# Patient Record
Sex: FEMALE | Race: WHITE | NOT HISPANIC OR LATINO | Employment: OTHER | URBAN - METROPOLITAN AREA
[De-identification: names, ages, dates, MRNs, and addresses within clinical notes are randomized per-mention and may not be internally consistent; named-entity substitution may affect disease eponyms.]

---

## 2018-05-09 LAB
ABSOL LYMPHOCYTES (HISTORICAL): 1.4 K/UL (ref 0.5–4)
ALBUMIN SERPL BCP-MCNC: 4.4 G/DL (ref 3–5.2)
ALP SERPL-CCNC: 54 U/L (ref 43–122)
ALT SERPL W P-5'-P-CCNC: 29 U/L (ref 9–52)
AMORPHOUS MATERIAL (HISTORICAL): ABNORMAL
ANION GAP SERPL CALCULATED.3IONS-SCNC: 11 MMOL/L (ref 5–14)
AST SERPL W P-5'-P-CCNC: 25 U/L (ref 14–36)
BACTERIA UR QL AUTO: ABNORMAL
BASOPHILS # BLD AUTO: 0 K/UL (ref 0–0.1)
BASOPHILS # BLD AUTO: 1 % (ref 0–1)
BILIRUB SERPL-MCNC: 0.4 MG/DL
BILIRUB UR QL STRIP: NEGATIVE MG/DL
BUN SERPL-MCNC: 19 MG/DL (ref 5–25)
CALCIUM SERPL-MCNC: 10 MG/DL (ref 8.4–10.2)
CASTS/CASTS TYPE (HISTORICAL): ABNORMAL /LPF
CHLORIDE SERPL-SCNC: 100 MEQ/L (ref 97–108)
CHOLEST SERPL-MCNC: 172 MG/DL
CHOLEST/HDLC SERPL: 3.6 {RATIO}
CLARITY UR: CLEAR
CO2 SERPL-SCNC: 32 MMOL/L (ref 22–30)
COLOR UR: ABNORMAL
CREATINE, SERUM (HISTORICAL): 0.69 MG/DL (ref 0.6–1.2)
CREATININE, RANDOM URINE (HISTORICAL): 38.3 MG/DL (ref 50–200)
CRYSTAL TYPE (HISTORICAL): ABNORMAL /HPF
DEPRECATED RDW RBC AUTO: 14.5 %
EGFR (HISTORICAL): >60 ML/MIN/1.73 M2
EOSINOPHIL # BLD AUTO: 0.1 K/UL (ref 0–0.4)
EOSINOPHIL NFR BLD AUTO: 2 % (ref 0–6)
EST. AVERAGE GLUCOSE BLD GHB EST-MCNC: 143 MG/DL
GLUCOSE FASTING (HISTORICAL): 121 MG/DL (ref 70–99)
GLUCOSE UR STRIP-MCNC: NEGATIVE MG/DL
HBA1C MFR BLD HPLC: 6.6 %
HCT VFR BLD AUTO: 42.5 % (ref 36–46)
HDLC SERPL-MCNC: 48 MG/DL
HGB BLD-MCNC: 14.1 G/DL (ref 12–16)
HGB UR QL STRIP.AUTO: NEGATIVE
KETONES UR STRIP-MCNC: NEGATIVE MG/DL
LDL/HDL RATIO (HISTORICAL): 2
LDLC SERPL CALC-MCNC: 95 MG/DL
LEUKOCYTE ESTERASE UR QL STRIP: ABNORMAL
LYMPHOCYTES NFR BLD AUTO: 23 % (ref 25–45)
MCH RBC QN AUTO: 28.6 PG (ref 26–34)
MCHC RBC AUTO-ENTMCNC: 33.1 % (ref 31–36)
MCV RBC AUTO: 86 FL (ref 80–100)
MICROALBUM.,U,RANDOM (HISTORICAL): <0.6 MG/DL
MICROALBUMIN/CREATININE RATIO (HISTORICAL): ABNORMAL
MONOCYTES # BLD AUTO: 0.4 K/UL (ref 0.2–0.9)
MONOCYTES NFR BLD AUTO: 6 % (ref 1–10)
MUCOUS THREADS URNS QL MICRO: ABNORMAL
NEUTROPHILS ABS COUNT (HISTORICAL): 4 K/UL (ref 1.8–7.8)
NEUTS SEG NFR BLD AUTO: 68 % (ref 45–65)
NITRITE UR QL STRIP: NEGATIVE
NON-SQ EPI CELLS URNS QL MICRO: ABNORMAL
OTHER STN SPEC: ABNORMAL
PH UR STRIP.AUTO: 6.5 [PH] (ref 4.5–8)
PLATELET # BLD AUTO: 229 K/MCL (ref 150–450)
POTASSIUM SERPL-SCNC: 4.7 MEQ/L (ref 3.6–5)
PROT UR STRIP-MCNC: NEGATIVE MG/DL
RBC # BLD AUTO: 4.92 M/MCL (ref 4–5.2)
RBC #/AREA URNS AUTO: ABNORMAL /HPF
SODIUM SERPL-SCNC: 143 MEQ/L (ref 137–147)
SP GR UR STRIP.AUTO: 1.01 (ref 1–1.04)
TOTAL PROTEIN (HISTORICAL): 7.2 G/DL (ref 5.9–8.4)
TRIGL SERPL-MCNC: 145 MG/DL
TSH SERPL DL<=0.05 MIU/L-ACNC: 1.07 UIU/ML (ref 0.47–4.68)
UROBILINOGEN UR QL STRIP.AUTO: NEGATIVE MG/DL (ref 0–1)
VLDLC SERPL CALC-MCNC: 29 MG/DL (ref 0–40)
WBC # BLD AUTO: 5.9 K/MCL (ref 4.5–11)
WBC #/AREA URNS AUTO: 3 /HPF

## 2018-08-02 RX ORDER — HYDROCHLOROTHIAZIDE 25 MG/1
TABLET ORAL
COMMUNITY
Start: 2018-05-24 | End: 2018-09-19

## 2018-08-02 RX ORDER — VALSARTAN 80 MG/1
TABLET ORAL
COMMUNITY
Start: 2017-10-16 | End: 2018-08-07 | Stop reason: SDUPTHER

## 2018-08-07 ENCOUNTER — OFFICE VISIT (OUTPATIENT)
Dept: FAMILY MEDICINE CLINIC | Facility: CLINIC | Age: 83
End: 2018-08-07
Payer: MEDICARE

## 2018-08-07 VITALS
SYSTOLIC BLOOD PRESSURE: 136 MMHG | HEIGHT: 63 IN | TEMPERATURE: 96.9 F | BODY MASS INDEX: 27.89 KG/M2 | HEART RATE: 88 BPM | WEIGHT: 157.4 LBS | DIASTOLIC BLOOD PRESSURE: 78 MMHG

## 2018-08-07 DIAGNOSIS — M25.521 ELBOW PAIN, RIGHT: Primary | ICD-10-CM

## 2018-08-07 DIAGNOSIS — I10 HYPERTENSION, UNSPECIFIED TYPE: ICD-10-CM

## 2018-08-07 DIAGNOSIS — R09.89 BRUIT: ICD-10-CM

## 2018-08-07 DIAGNOSIS — R55 BLACK-OUT (NOT AMNESIA): ICD-10-CM

## 2018-08-07 DIAGNOSIS — G45.9 TRANSIENT CEREBRAL ISCHEMIA, UNSPECIFIED TYPE: ICD-10-CM

## 2018-08-07 DIAGNOSIS — S09.90XD INJURY OF HEAD, SUBSEQUENT ENCOUNTER: ICD-10-CM
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
## 2018-08-07 DIAGNOSIS — G45.8 OTHER SPECIFIED TRANSIENT CEREBRAL ISCHEMIAS: ICD-10-CM

## 2018-08-07 PROBLEM — S09.90XA HEAD INJURY: Status: ACTIVE | Noted: 2018-08-07

## 2018-08-07 PROCEDURE — 99214 OFFICE O/P EST MOD 30 MIN: CPT | Performed by: FAMILY MEDICINE

## 2018-08-07 RX ORDER — IRBESARTAN 75 MG/1
75 TABLET ORAL DAILY
Qty: 30 TABLET | Refills: 5 | Status: SHIPPED | OUTPATIENT
Start: 2018-08-07 | End: 2018-09-06 | Stop reason: SDUPTHER

## 2018-08-07 NOTE — PROGRESS NOTES
Assessment/Plan:    No problem-specific Assessment & Plan notes found for this encounter  Diagnoses and all orders for this visit:    Elbow pain, right  Comments:  hematoma is improving  Fall (on) (from) other stairs and steps, sequela  Comments:  fell 2 weeks back and now improving    Black-out (not amnesia)  Comments:  Have 3 events when patient has completley black out and has no reason what happened,  Orders:  -     Echo complete with contrast if indicated; Future  -     Ambulatory referral to Cardiology; Future  -     VAS carotid complete study; Future    Injury of head, subsequent encounter  Comments:  No headche but h/o injury on the foreheadd on the right side  the cut is improving  Other specified transient cerebral ischemias  Comments:  Not sure its TIA, will evaluate her with the carotid doppler, MRI/MRA and Echo, will do some blood work too    [address]: VAS carotid limited study; Future  -     VAS carotid complete study; Future    Transient cerebral ischemia, unspecified type  -     Echo complete with contrast if indicated; Future    Hypertension, unspecified type  Comments:  changing the valsartan to Avapro, will referred the patient to the cardiologist for further eval  Orders:  -     irbesartan (AVAPRO) 75 mg tablet; Take 1 tablet (75 mg total) by mouth daily    Other orders  -     Discontinue: glucose blood test strip; Test sugars by percutaneous route 1 time every day as prescribed by physician  -     glucose blood test strip; use 1 strip by Misc  (Non-Drug; Combo Route) route once daily  -     Discontinue: valsartan (DIOVAN) 80 mg tablet; TAKE 1 TABLET DAILY  -     hydrochlorothiazide (HYDRODIURIL) 25 mg tablet; take 1 tablet by oral route  every morning  -     sitaGLIPtin (JANUVIA) 100 mg tablet; every 24 hours  -     LANCETS SUPER THIN 28G MISC; use 1 lancet by Misc  (Non-Drug; Combo Route) route once TEST EVERY DAY          Subjective:      Patient ID: Aleja Aura Fields is a 81 y o  female  HPI    Patient is here for f/u of the fall, now recouping patient said that she has no idea, how she fell, she thinks that she black out for few mts and find out that he had a fall and has multiple injuries  The following portions of the patient's history were reviewed and updated as appropriate:   She  has a past medical history of Hemochromatosis and Hypertension  She   Patient Active Problem List    Diagnosis Date Noted    Black-out (not amnesia) 08/07/2018    Fall (on) (from) other stairs and steps, sequela 08/07/2018    Elbow pain, right 08/07/2018    Head injury 08/07/2018    TIA (transient ischemic attack) 08/07/2018     She  has a past surgical history that includes Knee surgery (Bilateral)  Her family history includes No Known Problems in her family  She  reports that she has never smoked  She has never used smokeless tobacco  She reports that she drinks alcohol  She reports that she does not use drugs  Current Outpatient Prescriptions   Medication Sig Dispense Refill    glucose blood test strip use 1 strip by Misc  (Non-Drug; Combo Route) route once daily      hydrochlorothiazide (HYDRODIURIL) 25 mg tablet take 1 tablet by oral route  every morning      irbesartan (AVAPRO) 75 mg tablet Take 1 tablet (75 mg total) by mouth daily 30 tablet 5    LANCETS SUPER THIN 28G MISC use 1 lancet by Misc  (Non-Drug; Combo Route) route once TEST EVERY DAY      sitaGLIPtin (JANUVIA) 100 mg tablet every 24 hours       No current facility-administered medications for this visit  No current outpatient prescriptions on file prior to visit  No current facility-administered medications on file prior to visit  She is allergic to no active allergies  Review of Systems   Constitutional: Negative  HENT: Negative  Eyes: Negative  Respiratory: Negative  Cardiovascular: Negative  Gastrointestinal: Negative  Genitourinary: Negative      Psychiatric/Behavioral: Negative  Objective:      /78 (BP Location: Left arm, Patient Position: Sitting, Cuff Size: Standard)   Pulse 88   Temp (!) 96 9 °F (36 1 °C) (Tympanic)   Ht 5' 3" (1 6 m)   Wt 71 4 kg (157 lb 6 4 oz)   BMI 27 88 kg/m²          Physical Exam   Constitutional: She is oriented to person, place, and time  She appears well-developed  HENT:   Head: Normocephalic  Mouth/Throat: Oropharynx is clear and moist    Neck: Normal range of motion  Cardiovascular: Normal rate and regular rhythm  Pulmonary/Chest: Effort normal and breath sounds normal    Abdominal: Soft  Bowel sounds are normal    Neurological: She is alert and oriented to person, place, and time  Skin: Skin is warm  Psychiatric: She has a normal mood and affect

## 2018-08-13 ENCOUNTER — TRANSCRIBE ORDERS (OUTPATIENT)
Dept: ADMINISTRATIVE | Facility: HOSPITAL | Age: 83
End: 2018-08-13

## 2018-08-13 ENCOUNTER — HOSPITAL ENCOUNTER (OUTPATIENT)
Dept: RADIOLOGY | Facility: IMAGING CENTER | Age: 83
Discharge: HOME | End: 2018-08-13
Payer: MEDICARE

## 2018-08-13 DIAGNOSIS — R55 BLACK-OUT (NOT AMNESIA): ICD-10-CM

## 2018-08-13 DIAGNOSIS — R09.89 BRUIT: ICD-10-CM

## 2018-08-13 PROCEDURE — 70551 MRI BRAIN STEM W/O DYE: CPT

## 2018-08-13 PROCEDURE — 70544 MR ANGIOGRAPHY HEAD W/O DYE: CPT

## 2018-08-21 ENCOUNTER — HOSPITAL ENCOUNTER (OUTPATIENT)
Dept: NON INVASIVE DIAGNOSTICS | Facility: HOSPITAL | Age: 83
Discharge: HOME | End: 2018-08-21
Payer: MEDICARE

## 2018-08-21 DIAGNOSIS — R09.89 BRUIT: ICD-10-CM

## 2018-08-21 DIAGNOSIS — G45.9 TRANSIENT CEREBRAL ISCHEMIA, UNSPECIFIED TYPE: ICD-10-CM

## 2018-08-21 DIAGNOSIS — R55 BLACK-OUT (NOT AMNESIA): ICD-10-CM

## 2018-08-21 PROCEDURE — 93306 TTE W/DOPPLER COMPLETE: CPT

## 2018-08-21 PROCEDURE — 93880 EXTRACRANIAL BILAT STUDY: CPT

## 2018-08-21 PROCEDURE — 93880 EXTRACRANIAL BILAT STUDY: CPT | Performed by: SURGERY

## 2018-08-23 PROCEDURE — 93306 TTE W/DOPPLER COMPLETE: CPT | Performed by: INTERNAL MEDICINE

## 2018-08-23 NOTE — PROGRESS NOTES
Her Echo has some finding but its not abnormal, will discuss at the time of visit also the cardiologist will go over it too    I think she is not on any statin, if not than start her on atorvastain 10 mg (if not allergic )  Carotid doppler shows less than 50% narrowing so will start the atorvastain  MRA and MRI is fine

## 2018-08-24 ENCOUNTER — TELEPHONE (OUTPATIENT)
Dept: FAMILY MEDICINE CLINIC | Facility: CLINIC | Age: 83
End: 2018-08-24

## 2018-08-24 NOTE — TELEPHONE ENCOUNTER
----- Message from Sharla Leon MD sent at 8/23/2018  7:23 PM EDT -----  Her Echo has some finding but its not abnormal, will discuss at the time of visit also the cardiologist will go over it too    I think she is not on any statin, if not than start her on atorvastain 10 mg (if not allergic )  Carotid doppler shows less than 50% narrowing so will start the atorvastain  MRA and MRI is fine

## 2018-08-27 ENCOUNTER — TELEPHONE (OUTPATIENT)
Dept: FAMILY MEDICINE CLINIC | Facility: CLINIC | Age: 83
End: 2018-08-27

## 2018-08-27 DIAGNOSIS — E78.5 HYPERLIPIDEMIA, UNSPECIFIED HYPERLIPIDEMIA TYPE: Primary | ICD-10-CM

## 2018-08-27 NOTE — TELEPHONE ENCOUNTER
Pt aware and agreed to start on atorvastatin which will be sent to [facility] as directed on med list awaiting Dr Nair Faroese approval

## 2018-08-27 NOTE — TELEPHONE ENCOUNTER
Pt returned call about her results   She has appt with cardiologist this week which she will discuss starting on statin medication and if not she has appt with Dr Earnestine Vidal on 9/19

## 2018-08-29 RX ORDER — ATORVASTATIN CALCIUM 10 MG/1
10 TABLET, FILM COATED ORAL DAILY
Qty: 30 TABLET | Refills: 0 | Status: CANCELLED | OUTPATIENT
Start: 2018-08-29

## 2018-09-06 ENCOUNTER — OFFICE VISIT (OUTPATIENT)
Dept: CARDIOLOGY CLINIC | Facility: CLINIC | Age: 83
End: 2018-09-06
Payer: MEDICARE

## 2018-09-06 VITALS
HEIGHT: 63 IN | SYSTOLIC BLOOD PRESSURE: 148 MMHG | WEIGHT: 161 LBS | DIASTOLIC BLOOD PRESSURE: 60 MMHG | BODY MASS INDEX: 28.53 KG/M2

## 2018-09-06 DIAGNOSIS — I10 HYPERTENSION, UNSPECIFIED TYPE: ICD-10-CM

## 2018-09-06 DIAGNOSIS — R55 SYNCOPE AND COLLAPSE: Primary | ICD-10-CM

## 2018-09-06 DIAGNOSIS — R55 BLACK-OUT (NOT AMNESIA): ICD-10-CM

## 2018-09-06 PROCEDURE — 99204 OFFICE O/P NEW MOD 45 MIN: CPT | Performed by: INTERNAL MEDICINE

## 2018-09-06 RX ORDER — ATORVASTATIN CALCIUM 10 MG/1
TABLET, FILM COATED ORAL
Refills: 0 | COMMUNITY
Start: 2018-08-29 | End: 2018-11-29 | Stop reason: SDUPTHER

## 2018-09-06 RX ORDER — IRBESARTAN 75 MG/1
75 TABLET ORAL DAILY
Qty: 30 TABLET | Refills: 5 | Status: SHIPPED | OUTPATIENT
Start: 2018-09-06 | End: 2018-11-29 | Stop reason: SDUPTHER

## 2018-09-06 RX ORDER — ASPIRIN 81 MG/1
81 TABLET ORAL DAILY
Refills: 0
Start: 2018-09-06

## 2018-09-06 NOTE — LETTER
September 6, 2018     Fausto Cosme MD  [address] [facility]    Patient: Radha Nguyen   YOB: 1935   Date of Visit: 9/6/2018       Dear Dr Royal Gonzalez:    Thank you for referring Rio Reeves to me for evaluation  Below are my notes for this consultation  If you have questions, please do not hesitate to call me  I look forward to following your patient along with you  Sincerely,        Citlali Thomas MD        CC: No Recipients  Citlali Thomas MD  9/6/2018  3:47 PM  Sign at close encounter                                             Cardiology Consultation     Radha Nguyen  [id number]  1935  [address] [facility], [address] [facility]      1  Syncope and collapse  Holter monitor - 48 hour   2  Black-out (not amnesia)  Ambulatory referral to Cardiology    Have 3 events when patient has completley black out and has no reason what happened,       Discussion/Summary     Syncope, unclear etiology  Episodes are very far apart from each other  The most recent episode resulted in significant trauma when she fell down stairs although this one occurred after getting up, suggesting an orthostatic etiology  She is on HCTZ  I don't have copy of recent blood work, although she said it was done recently  I'll request the blood work as well as the EKG  If any abnormalities, or she has other symptoms of orthostasis, may be wise to change to a different antihypertensive, particularly with increasing age  Otherwise, an arrhythmia is possible although in the setting of a structurally normal heart and no palpitations and a reportedly normal EKG (I will get this as well), also unlikely  Will check a 48 hour Holter monitor to evaluate  If abnormalities or recurrence, I discussed potential of event recorder or loop recorder  No signs/symptoms of ischemia      Discussed adequate hydration, caution with changing positions  With minimal carotid disease, she was started on a statin at a low dose  Reasonable with DM as well  Will have her take 81mg aspirin also  History of Present Illness:  New patient referred by Dr Antonieta Reno for syncope  There is no significant cardiac history  She says these episodes of syncope go back to December 2016  She has had 3 such episodes total   One occurred while she was sitting in a chair  She slumped over  Came to just a second later  Returned to completely normal   Another episode when she was sitting at [employer]  She was talking to someone  Felt as though there was a gap in her thinking, but unclear if she actually lost consciousness on this episode  Most recent episode occurred when she stood up from a stool was walking around the deck and then fell down stairs  This was the most recent episode which occurred in [place]  Overall, she is otherwise without any symptoms  She has no dizziness or lightheadedness, or presyncope  There is no PND or orthopnea  She has no palpitations  Recently had some cardiac testing done including EKG, echocardiogram   She also had MRI/MRA and carotid testing  No significant abnormalities found on any of these  I do not have the EKG to review, but will request this  Since the episode which occurred most recently in July, she has been feeling fine  Has been on antihypertensives for about a year  Also diagnosed as a diabetic  Sugars have been under control without highs or lows  She tries to stay adequately hydrated throughout the day  On 1 episode, she was having an alcoholic beverage in was a hot day so she may have been a little bit dehydrated  However, in [place] she says nothing was really out of the ordinary        Patient Active Problem List   Diagnosis    Syncope and collapse    Fall (on) (from) other stairs and steps, sequela    Elbow pain, right    Head injury    TIA (transient ischemic attack)     Past Medical History:   Diagnosis Date    Hemochromatosis     Hypertension      Social History     Social History    Marital status: /Civil Union     Spouse name: N/A    Number of children: N/A    Years of education: N/A     Occupational History    Not on file  Social History Main Topics    Smoking status: Never Smoker    Smokeless tobacco: Never Used    Alcohol use Yes    Drug use: No    Sexual activity: Not on file     Other Topics Concern    Not on file     Social History Narrative    No narrative on file      Family History   Problem Relation Age of Onset    No Known Problems Family      Past Surgical History:   Procedure Laterality Date    KNEE SURGERY Bilateral        Current Outpatient Prescriptions:     atorvastatin (LIPITOR) 10 mg tablet, TK 1 T PO QD, Disp: , Rfl: 0    glucose blood test strip, use 1 strip by Misc  (Non-Drug; Combo Route) route once daily, Disp: , Rfl:     hydrochlorothiazide (HYDRODIURIL) 25 mg tablet, take 1 tablet by oral route  every morning, Disp: , Rfl:     irbesartan (AVAPRO) 75 mg tablet, Take 1 tablet (75 mg total) by mouth daily, Disp: 30 tablet, Rfl: 5    LANCETS SUPER THIN 28G MISC, use 1 lancet by Misc  (Non-Drug; Combo Route) route once TEST EVERY DAY, Disp: , Rfl:     sitaGLIPtin (JANUVIA) 100 mg tablet, every 24 hours, Disp: , Rfl:   Allergies   Allergen Reactions    No Active Allergies        Vitals:    09/06/18 1438   BP: 148/60   Weight: 73 kg (161 lb)   Height: 5' 3" (1 6 m)     Vitals:    09/06/18 1438   Weight: 73 kg (161 lb)      Height: 5' 3" (160 cm)   Body mass index is 28 52 kg/m²      Physical Exam:  GENERAL: Alert, well appearing, and in no distress  HEENT:  PERRL, EOMI, no scleral icterus, no conjunctival pallor  NECK:  Supple, No elevated JVP, no thyromegaly, no carotid bruits  HEART:  Regular rate and rhythm, normal S1/S2, no S3/S4, no murmur or rub  LUNGS:  Clear to auscultation bilaterally  ABDOMEN:  Soft, non-tender, positive bowel sounds, no rebound or guarding  EXTREMITIES:  No edema  VASCULAR:  Normal pedal pulses   NEURO: No focal deficits,  SKIN: Normal without suspicious lesions on exposed skin      ROS:  Positive for post menopausal   Except as noted in HPI, is otherwise reviewed in detail and a 12 point review of systems is negative  Labs:  Lab Results   Component Value Date     05/09/2018    K 4 7 05/09/2018     05/09/2018    BUN 19 05/09/2018    CO2 32 (H) 05/09/2018    ALT 29 05/09/2018    AST 25 05/09/2018    GLUF 121 (H) 05/09/2018    HGBA1C 6 6 (H) 05/09/2018    WBC 5 9 05/09/2018    HGB 14 1 05/09/2018    HCT 42 5 05/09/2018     05/09/2018       Lab Results   Component Value Date    CHOL 172 05/09/2018     Lab Results   Component Value Date    HDL 48 05/09/2018     Lab Results   Component Value Date    LDLCALC 95 05/09/2018     Lab Results   Component Value Date    TRIG 145 05/09/2018       Testing:  LEFT VENTRICLE:  Normal left ventricular cavity size, normal wall thickness, normal left ventricular systolic function and wall motion  Ejection fraction is estimated as around 60-65%  Early grade 1 diastolic dysfunction  Normal estimated left atrial  pressure      RIGHT VENTRICLE:  Normal right ventricular size and systolic function  Normal estimated right ventricular systolic pressure      LEFT ATRIUM:  Top normal left atrial cavity size  Slightly aneurysmal interatrial septum  No color-flow Doppler evidence of patent foramina ovale or atrial septal defect      RIGHT ATRIUM:  Normal right atrial cavity size      MITRAL VALVE:  Mitral annular calcification  Mild mitral valve leaflet thickening with focal calcification on leaflet tips  Trace mitral valve regurgitation      AORTIC VALVE:  Trileaflet aortic valve with sclerosis  No aortic valve stenosis   Trace aortic valve regurgitation      TRICUSPID VALVE:  Mild tricuspid valve regurgitation      PULMONIC VALVE:  Trace pulmonic valve regurgitation      IVC, HEPATIC VEINS:  Inferior vena cava is normal in size and demonstrates appropriate respiratory phasic changes in diameter      PERICARDIUM:  No pericardial effusion

## 2018-09-06 NOTE — PROGRESS NOTES
Cardiology Consultation     Aleja Fields  [id number]  1935  [address]  [address]      1  Syncope and collapse  Holter monitor - 48 hour   2  Black-out (not amnesia)  Ambulatory referral to Cardiology    Have 3 events when patient has completley black out and has no reason what happened,       Discussion/Summary     Syncope, unclear etiology  Episodes are very far apart from each other  The most recent episode resulted in significant trauma when she fell down stairs although this one occurred after getting up, suggesting an orthostatic etiology  She is on HCTZ  I don't have copy of recent blood work, although she said it was done recently  I'll request the blood work as well as the EKG  If any abnormalities, or she has other symptoms of orthostasis, may be wise to change to a different antihypertensive, particularly with increasing age  Otherwise, an arrhythmia is possible although in the setting of a structurally normal heart and no palpitations and a reportedly normal EKG (I will get this as well), also unlikely  Will check a 48 hour Holter monitor to evaluate  If abnormalities or recurrence, I discussed potential of event recorder or loop recorder  No signs/symptoms of ischemia  Discussed adequate hydration, caution with changing positions  With minimal carotid disease, she was started on a statin at a low dose  Reasonable with DM as well  Will have her take 81mg aspirin also  History of Present Illness:  New patient referred by Dr Earnestine Vidal for syncope  There is no significant cardiac history  She says these episodes of syncope go back to December 2016  She has had 3 such episodes total   One occurred while she was sitting in a chair  She slumped over  Came to just a second later    Returned to completely normal   Another episode when she was sitting at a restaurant  She was talking to someone  Felt as though there was a gap in her thinking, but unclear if she actually lost consciousness on this episode  Most recent episode occurred when she stood up from a stool was walking around the deck and then fell down stairs  This was the most recent episode which occurred in [place]  Overall, she is otherwise without any symptoms  She has no dizziness or lightheadedness, or presyncope  There is no PND or orthopnea  She has no palpitations  Recently had some cardiac testing done including EKG, echocardiogram   She also had MRI/MRA and carotid testing  No significant abnormalities found on any of these  I do not have the EKG to review, but will request this  Since the episode which occurred most recently in July, she has been feeling fine  Has been on antihypertensives for about a year  Also diagnosed as a diabetic  Sugars have been under control without highs or lows  She tries to stay adequately hydrated throughout the day  On 1 episode, she was having an alcoholic beverage in was a hot day so she may have been a little bit dehydrated  However, in [place] she says nothing was really out of the ordinary  Patient Active Problem List   Diagnosis    Syncope and collapse    Fall (on) (from) other stairs and steps, sequela    Elbow pain, right    Head injury    TIA (transient ischemic attack)     Past Medical History:   Diagnosis Date    Hemochromatosis     Hypertension      Social History     Social History    Marital status: /Civil Union     Spouse name: N/A    Number of children: N/A    Years of education: N/A     Occupational History    Not on file       Social History Main Topics    Smoking status: Never Smoker    Smokeless tobacco: Never Used    Alcohol use Yes    Drug use: No    Sexual activity: Not on file     Other Topics Concern    Not on file     Social History Narrative    No narrative on file      Family History   Problem Relation Age of Onset    No Known Problems Family      Past Surgical History:   Procedure Laterality Date    KNEE SURGERY Bilateral        Current Outpatient Prescriptions:     atorvastatin (LIPITOR) 10 mg tablet, TK 1 T PO QD, Disp: , Rfl: 0    glucose blood test strip, use 1 strip by Misc  (Non-Drug; Combo Route) route once daily, Disp: , Rfl:     hydrochlorothiazide (HYDRODIURIL) 25 mg tablet, take 1 tablet by oral route  every morning, Disp: , Rfl:     irbesartan (AVAPRO) 75 mg tablet, Take 1 tablet (75 mg total) by mouth daily, Disp: 30 tablet, Rfl: 5    LANCETS SUPER THIN 28G MISC, use 1 lancet by Misc  (Non-Drug; Combo Route) route once TEST EVERY DAY, Disp: , Rfl:     sitaGLIPtin (JANUVIA) 100 mg tablet, every 24 hours, Disp: , Rfl:   Allergies   Allergen Reactions    No Active Allergies        Vitals:    09/06/18 1438   BP: 148/60   Weight: 73 kg (161 lb)   Height: 5' 3" (1 6 m)     Vitals:    09/06/18 1438   Weight: 73 kg (161 lb)      Height: 5' 3" (160 cm)   Body mass index is 28 52 kg/m²  Physical Exam:  GENERAL: Alert, well appearing, and in no distress  HEENT:  PERRL, EOMI, no scleral icterus, no conjunctival pallor  NECK:  Supple, No elevated JVP, no thyromegaly, no carotid bruits  HEART:  Regular rate and rhythm, normal S1/S2, no S3/S4, no murmur or rub  LUNGS:  Clear to auscultation bilaterally  ABDOMEN:  Soft, non-tender, positive bowel sounds, no rebound or guarding  EXTREMITIES:  No edema  VASCULAR:  Normal pedal pulses   NEURO: No focal deficits,  SKIN: Normal without suspicious lesions on exposed skin      ROS:  Positive for post menopausal   Except as noted in HPI, is otherwise reviewed in detail and a 12 point review of systems is negative      Labs:  Lab Results   Component Value Date     05/09/2018    K 4 7 05/09/2018     05/09/2018    BUN 19 05/09/2018    CO2 32 (H) 05/09/2018    ALT 29 05/09/2018    AST 25 05/09/2018    GLUF 121 (H) 05/09/2018 HGBA1C 6 6 (H) 05/09/2018    WBC 5 9 05/09/2018    HGB 14 1 05/09/2018    HCT 42 5 05/09/2018     05/09/2018       Lab Results   Component Value Date    CHOL 172 05/09/2018     Lab Results   Component Value Date    HDL 48 05/09/2018     Lab Results   Component Value Date    LDLCALC 95 05/09/2018     Lab Results   Component Value Date    TRIG 145 05/09/2018       Testing:  LEFT VENTRICLE:  Normal left ventricular cavity size, normal wall thickness, normal left ventricular systolic function and wall motion  Ejection fraction is estimated as around 60-65%  Early grade 1 diastolic dysfunction  Normal estimated left atrial  pressure      RIGHT VENTRICLE:  Normal right ventricular size and systolic function  Normal estimated right ventricular systolic pressure      LEFT ATRIUM:  Top normal left atrial cavity size  Slightly aneurysmal interatrial septum  No color-flow Doppler evidence of patent foramina ovale or atrial septal defect      RIGHT ATRIUM:  Normal right atrial cavity size      MITRAL VALVE:  Mitral annular calcification  Mild mitral valve leaflet thickening with focal calcification on leaflet tips  Trace mitral valve regurgitation      AORTIC VALVE:  Trileaflet aortic valve with sclerosis  No aortic valve stenosis  Trace aortic valve regurgitation      TRICUSPID VALVE:  Mild tricuspid valve regurgitation      PULMONIC VALVE:  Trace pulmonic valve regurgitation      IVC, HEPATIC VEINS:  Inferior vena cava is normal in size and demonstrates appropriate respiratory phasic changes in diameter      PERICARDIUM:  No pericardial effusion

## 2018-09-12 ENCOUNTER — TRANSCRIBE ORDERS (OUTPATIENT)
Dept: ADMINISTRATIVE | Facility: HOSPITAL | Age: 83
End: 2018-09-12

## 2018-09-12 ENCOUNTER — APPOINTMENT (OUTPATIENT)
Dept: LAB | Facility: IMAGING CENTER | Age: 83
End: 2018-09-12
Payer: MEDICARE

## 2018-09-12 DIAGNOSIS — I10 ESSENTIAL (PRIMARY) HYPERTENSION: ICD-10-CM

## 2018-09-12 DIAGNOSIS — I10 ESSENTIAL (PRIMARY) HYPERTENSION: Primary | ICD-10-CM

## 2018-09-12 DIAGNOSIS — E78.49 OTHER HYPERLIPIDEMIA: ICD-10-CM

## 2018-09-12 DIAGNOSIS — E11.9 TYPE 2 DIABETES MELLITUS WITHOUT COMPLICATION, UNSPECIFIED WHETHER LONG TERM INSULIN USE (HCC): ICD-10-CM

## 2018-09-12 LAB
ALBUMIN SERPL BCP-MCNC: 3.7 G/DL (ref 3.5–5)
ALP SERPL-CCNC: 53 U/L (ref 46–116)
ALT SERPL W P-5'-P-CCNC: 24 U/L (ref 12–78)
ANION GAP SERPL CALCULATED.3IONS-SCNC: 6 MMOL/L (ref 4–13)
AST SERPL W P-5'-P-CCNC: 20 U/L (ref 5–45)
BACTERIA UR QL AUTO: ABNORMAL /HPF
BASOPHILS # BLD AUTO: 0.04 THOUSANDS/ΜL (ref 0–0.1)
BASOPHILS NFR BLD AUTO: 1 % (ref 0–1)
BILIRUB SERPL-MCNC: 0.49 MG/DL (ref 0.2–1)
BILIRUB UR QL STRIP: NEGATIVE
BUN SERPL-MCNC: 21 MG/DL (ref 5–25)
CALCIUM SERPL-MCNC: 9.1 MG/DL (ref 8.3–10.1)
CHLORIDE SERPL-SCNC: 103 MMOL/L (ref 100–108)
CHOLEST SERPL-MCNC: 110 MG/DL (ref 50–200)
CLARITY UR: CLEAR
CO2 SERPL-SCNC: 30 MMOL/L (ref 21–32)
COLOR UR: YELLOW
CREAT SERPL-MCNC: 0.85 MG/DL (ref 0.6–1.3)
CREAT UR-MCNC: 138 MG/DL
EOSINOPHIL # BLD AUTO: 0.14 THOUSAND/ΜL (ref 0–0.61)
EOSINOPHIL NFR BLD AUTO: 2 % (ref 0–6)
ERYTHROCYTE [DISTWIDTH] IN BLOOD BY AUTOMATED COUNT: 13.2 % (ref 11.6–15.1)
EST. AVERAGE GLUCOSE BLD GHB EST-MCNC: 131 MG/DL
GFR SERPL CREATININE-BSD FRML MDRD: 64 ML/MIN/1.73SQ M
GLUCOSE P FAST SERPL-MCNC: 93 MG/DL (ref 65–99)
GLUCOSE UR STRIP-MCNC: NEGATIVE MG/DL
HBA1C MFR BLD: 6.2 % (ref 4.2–6.3)
HCT VFR BLD AUTO: 40.5 % (ref 34.8–46.1)
HDLC SERPL-MCNC: 44 MG/DL (ref 40–60)
HGB BLD-MCNC: 12.5 G/DL (ref 11.5–15.4)
HGB UR QL STRIP.AUTO: NEGATIVE
HYALINE CASTS #/AREA URNS LPF: ABNORMAL /LPF
IMM GRANULOCYTES # BLD AUTO: 0.01 THOUSAND/UL (ref 0–0.2)
IMM GRANULOCYTES NFR BLD AUTO: 0 % (ref 0–2)
KETONES UR STRIP-MCNC: NEGATIVE MG/DL
LDLC SERPL CALC-MCNC: 43 MG/DL (ref 0–100)
LEUKOCYTE ESTERASE UR QL STRIP: ABNORMAL
LYMPHOCYTES # BLD AUTO: 1.57 THOUSANDS/ΜL (ref 0.6–4.47)
LYMPHOCYTES NFR BLD AUTO: 26 % (ref 14–44)
MCH RBC QN AUTO: 28.7 PG (ref 26.8–34.3)
MCHC RBC AUTO-ENTMCNC: 30.9 G/DL (ref 31.4–37.4)
MCV RBC AUTO: 93 FL (ref 82–98)
MICROALBUMIN UR-MCNC: 7.4 MG/L (ref 0–20)
MICROALBUMIN/CREAT 24H UR: 5 MG/G CREATININE (ref 0–30)
MONOCYTES # BLD AUTO: 0.41 THOUSAND/ΜL (ref 0.17–1.22)
MONOCYTES NFR BLD AUTO: 7 % (ref 4–12)
NEUTROPHILS # BLD AUTO: 3.82 THOUSANDS/ΜL (ref 1.85–7.62)
NEUTS SEG NFR BLD AUTO: 64 % (ref 43–75)
NITRITE UR QL STRIP: NEGATIVE
NON-SQ EPI CELLS URNS QL MICRO: ABNORMAL /HPF
NONHDLC SERPL-MCNC: 66 MG/DL
NRBC BLD AUTO-RTO: 0 /100 WBCS
PH UR STRIP.AUTO: 6.5 [PH] (ref 4.5–8)
PLATELET # BLD AUTO: 238 THOUSANDS/UL (ref 149–390)
PMV BLD AUTO: 11.3 FL (ref 8.9–12.7)
POTASSIUM SERPL-SCNC: 5.2 MMOL/L (ref 3.5–5.3)
PROT SERPL-MCNC: 7.2 G/DL (ref 6.4–8.2)
PROT UR STRIP-MCNC: NEGATIVE MG/DL
RBC # BLD AUTO: 4.36 MILLION/UL (ref 3.81–5.12)
RBC #/AREA URNS AUTO: ABNORMAL /HPF
SODIUM SERPL-SCNC: 139 MMOL/L (ref 136–145)
SP GR UR STRIP.AUTO: 1.02 (ref 1–1.03)
TRIGL SERPL-MCNC: 114 MG/DL
TSH SERPL DL<=0.05 MIU/L-ACNC: 1.12 UIU/ML (ref 0.36–3.74)
UROBILINOGEN UR QL STRIP.AUTO: 1 E.U./DL
WBC # BLD AUTO: 5.99 THOUSAND/UL (ref 4.31–10.16)
WBC #/AREA URNS AUTO: ABNORMAL /HPF

## 2018-09-12 PROCEDURE — 36415 COLL VENOUS BLD VENIPUNCTURE: CPT

## 2018-09-12 PROCEDURE — 83036 HEMOGLOBIN GLYCOSYLATED A1C: CPT

## 2018-09-12 PROCEDURE — 80053 COMPREHEN METABOLIC PANEL: CPT

## 2018-09-12 PROCEDURE — 81001 URINALYSIS AUTO W/SCOPE: CPT | Performed by: FAMILY MEDICINE

## 2018-09-12 PROCEDURE — 82043 UR ALBUMIN QUANTITATIVE: CPT | Performed by: FAMILY MEDICINE

## 2018-09-12 PROCEDURE — 84443 ASSAY THYROID STIM HORMONE: CPT

## 2018-09-12 PROCEDURE — 85025 COMPLETE CBC W/AUTO DIFF WBC: CPT

## 2018-09-12 PROCEDURE — 80061 LIPID PANEL: CPT

## 2018-09-12 PROCEDURE — 82570 ASSAY OF URINE CREATININE: CPT | Performed by: FAMILY MEDICINE

## 2018-09-13 ENCOUNTER — HOSPITAL ENCOUNTER (OUTPATIENT)
Dept: NON INVASIVE DIAGNOSTICS | Facility: HOSPITAL | Age: 83
Discharge: HOME | End: 2018-09-13
Attending: INTERNAL MEDICINE
Payer: MEDICARE

## 2018-09-13 DIAGNOSIS — R55 SYNCOPE AND COLLAPSE: ICD-10-CM

## 2018-09-13 PROCEDURE — 93225 XTRNL ECG REC<48 HRS REC: CPT

## 2018-09-13 PROCEDURE — 93226 XTRNL ECG REC<48 HR SCAN A/R: CPT

## 2018-09-19 ENCOUNTER — TELEPHONE (OUTPATIENT)
Dept: FAMILY MEDICINE CLINIC | Facility: CLINIC | Age: 83
End: 2018-09-19

## 2018-09-19 ENCOUNTER — OFFICE VISIT (OUTPATIENT)
Dept: FAMILY MEDICINE CLINIC | Facility: CLINIC | Age: 83
End: 2018-09-19
Payer: MEDICARE

## 2018-09-19 VITALS
BODY MASS INDEX: 28.77 KG/M2 | HEART RATE: 74 BPM | OXYGEN SATURATION: 96 % | TEMPERATURE: 97.6 F | HEIGHT: 63 IN | DIASTOLIC BLOOD PRESSURE: 76 MMHG | SYSTOLIC BLOOD PRESSURE: 136 MMHG | RESPIRATION RATE: 16 BRPM | WEIGHT: 162.4 LBS

## 2018-09-19 DIAGNOSIS — I10 ESSENTIAL HYPERTENSION: Primary | ICD-10-CM

## 2018-09-19 DIAGNOSIS — E11.9 DIABETES MELLITUS WITHOUT COMPLICATION (HCC): ICD-10-CM

## 2018-09-19 DIAGNOSIS — R55 SYNCOPE, UNSPECIFIED SYNCOPE TYPE: ICD-10-CM

## 2018-09-19 PROCEDURE — 99214 OFFICE O/P EST MOD 30 MIN: CPT | Performed by: FAMILY MEDICINE

## 2018-09-19 NOTE — PROGRESS NOTES
Assessment/Plan:    No problem-specific Assessment & Plan notes found for this encounter  Diagnoses and all orders for this visit:    Essential hypertension  Comments:  Controlled, discussed to check her blood pressure routinely  Diabetes mellitus without complication ([facility] [address] )  Comments:  Controlled, continue on Januvia    Syncope, unspecified syncope type  Comments:  Mini mental status exam was normal  MRI, ECHO showed no abnormalities  Ordered EEG routine, awake  Orders:  -     EEG Routine and awake; Future          Subjective:      Patient ID: Carolyn Buthcer is a 81 y o  female  AMELIE Watts is an 80 yo female coming in for a check up for her syncopal episodes  She reports that on Sunday morning, she just "didn't feel right"  Her last syncopal episode was in July, and she has gotten an MRI in 8/13 which showed normal results  She has also completed a VAS carotid study, and an ECHO on 8/21  The carotid study showed a less than 50% narrowing  The ECHO showed no abnormalities  She denies any headaches, dizziness, blurry vision, ear pain, numbness or tingling  She denies memory loss from the events  She also denies shakiness or tremors  The following portions of the patient's history were reviewed and updated as appropriate: current medications, past family history, past medical history, past social history and problem list       Review of Systems   Constitutional: Negative  HENT: Negative  Eyes: Negative  Respiratory: Negative  Cardiovascular: Negative  Gastrointestinal: Negative  Genitourinary: Negative  Musculoskeletal: Negative  Skin: Negative  Neurological: Negative  Hematological: Negative  Psychiatric/Behavioral: Negative            Objective:      /76 (BP Location: Left arm, Patient Position: Sitting, Cuff Size: Adult)   Pulse 74   Temp 97 6 °F (36 4 °C) (Tympanic)   Resp 16   Ht 5' 3" (1 6 m)   Wt 73 7 kg (162 lb 6 4 oz)   SpO2 96%   BMI 28 77 kg/m²          Physical Exam   Constitutional: She appears well-developed and well-nourished  HENT:   Right Ear: External ear normal    Left Ear: External ear normal    Mouth/Throat: Oropharynx is clear and moist    Cardiovascular: Normal rate, regular rhythm and normal heart sounds  Pulmonary/Chest: Effort normal and breath sounds normal  No respiratory distress  Neurological: She is alert  Skin: Skin is warm and dry       Mini mental Status Exam: Normal

## 2018-09-19 NOTE — TELEPHONE ENCOUNTER
Dr Curly Felty is ordering a EEG sleep and awake  She is scheduled on 9/27/18 at 9:30 am at [facility]  She is to have clean hair and do not put any beauty products in her hair  It must be dry   She is also to eat within 2 hours of test  Left message for pt to call office to discuss this test and give her date and time

## 2018-09-21 PROCEDURE — 93227 XTRNL ECG REC<48 HR R&I: CPT | Performed by: INTERNAL MEDICINE

## 2018-09-25 DIAGNOSIS — E11.9 TYPE 2 DIABETES MELLITUS WITHOUT COMPLICATION, UNSPECIFIED WHETHER LONG TERM INSULIN USE (HCC): Primary | ICD-10-CM

## 2018-09-25 NOTE — TELEPHONE ENCOUNTER
Pt called req new script for meter vilma plus test strips and soft clix lancets to be sent to [facility]

## 2018-09-27 ENCOUNTER — HOSPITAL ENCOUNTER (OUTPATIENT)
Dept: NEUROLOGY | Facility: HOSPITAL | Age: 83
Discharge: HOME | End: 2018-09-27
Payer: MEDICARE

## 2018-09-27 DIAGNOSIS — R55 SYNCOPE, UNSPECIFIED SYNCOPE TYPE: ICD-10-CM

## 2018-09-27 PROCEDURE — 95816 EEG AWAKE AND DROWSY: CPT | Performed by: PSYCHIATRY & NEUROLOGY

## 2018-09-27 PROCEDURE — 95816 EEG AWAKE AND DROWSY: CPT

## 2018-10-01 ENCOUNTER — OFFICE VISIT (OUTPATIENT)
Dept: FAMILY MEDICINE CLINIC | Facility: CLINIC | Age: 83
End: 2018-10-01
Payer: MEDICARE

## 2018-10-01 VITALS
RESPIRATION RATE: 16 BRPM | OXYGEN SATURATION: 96 % | WEIGHT: 165.4 LBS | HEIGHT: 63 IN | BODY MASS INDEX: 29.3 KG/M2 | HEART RATE: 92 BPM | TEMPERATURE: 97.3 F | DIASTOLIC BLOOD PRESSURE: 62 MMHG | SYSTOLIC BLOOD PRESSURE: 130 MMHG

## 2018-10-01 DIAGNOSIS — G40.802 OTHER EPILEPSY WITHOUT STATUS EPILEPTICUS, NOT INTRACTABLE (HCC): Primary | ICD-10-CM

## 2018-10-01 PROCEDURE — 99213 OFFICE O/P EST LOW 20 MIN: CPT | Performed by: FAMILY MEDICINE

## 2018-10-01 RX ORDER — LEVETIRACETAM 250 MG/1
250 TABLET ORAL 2 TIMES DAILY
Qty: 60 TABLET | Refills: 3 | Status: SHIPPED | OUTPATIENT
Start: 2018-10-01 | End: 2018-10-19 | Stop reason: SDUPTHER

## 2018-10-01 NOTE — PROGRESS NOTES
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, not intractable ([facility] )  Comments:  Patient had a recent Abnormal EEG  started on Keppra 250 mg BID   Already discuss the case with Dr Antony, he agrees to see her  Orders:  -     levETIRAcetam (KEPPRA) 250 mg tablet; Take 1 tablet (250 mg total) by mouth 2 (two) times a day  -     Ambulatory referral to Neurology; Future          Subjective:     Patient ID: Lydia Elaine is a 81 y o  female  HPI   Patient is here for the review of her EEG  According the history she has 3 events where she does not remember any thing, already did MRI, MRA, Carotid doppler, Echo, and Holter,including lengthy blood work and every thing almost came back negative, except the EEG, I already talked to Dr Antony and he agreed with the plan to start with Keppra , and with the instruction of no driving  Patient and  is aware of the plan and agreed with it  Review of Systems   Constitutional: Negative  HENT: Negative  Eyes: Negative  Respiratory: Negative  Cardiovascular: Negative  Gastrointestinal: Negative  Genitourinary: Negative  Psychiatric/Behavioral: Negative  Objective:     Physical Exam   Constitutional: She is oriented to person, place, and time  She appears well-developed  HENT:   Head: Normocephalic  Mouth/Throat: Oropharynx is clear and moist    Neck: Normal range of motion  Cardiovascular: Normal rate and regular rhythm  Pulmonary/Chest: Effort normal and breath sounds normal    Abdominal: Soft  Bowel sounds are normal    Neurological: She is alert and oriented to person, place, and time  Skin: Skin is warm  Psychiatric: She has a normal mood and affect

## 2018-10-03 ENCOUNTER — OFFICE VISIT (OUTPATIENT)
Dept: NEUROLOGY | Facility: CLINIC | Age: 83
End: 2018-10-03
Payer: MEDICARE

## 2018-10-03 VITALS
WEIGHT: 163.4 LBS | SYSTOLIC BLOOD PRESSURE: 131 MMHG | HEIGHT: 63 IN | DIASTOLIC BLOOD PRESSURE: 65 MMHG | RESPIRATION RATE: 16 BRPM | HEART RATE: 84 BPM | BODY MASS INDEX: 28.95 KG/M2

## 2018-10-03 DIAGNOSIS — G40.802 OTHER EPILEPSY WITHOUT STATUS EPILEPTICUS, NOT INTRACTABLE (HCC): ICD-10-CM

## 2018-10-03 PROBLEM — G40.909 EPILEPSY (HCC): Status: ACTIVE | Noted: 2018-10-03

## 2018-10-03 PROCEDURE — 99204 OFFICE O/P NEW MOD 45 MIN: CPT | Performed by: PHYSICIAN ASSISTANT

## 2018-10-03 NOTE — LETTER
October 3, 2018     Carrol Ragland MD  [address] [facility]    Patient: Preston Garcia   YOB: 1935   Date of Visit: 10/3/2018       Dear Dr Dianne Guerrero:    Thank you for referring Jose Galloway to me for evaluation  Below are my notes for this consultation  If you have questions, please do not hesitate to call me  I look forward to following your patient along with you  Sincerely,        Michelle Baig PA-C        CC: No Recipients  Michelle Baig PA-C  10/3/2018 12:12 PM  Sign at close encounter  Patient is here today for a new patient consult for seizures    Patient ID: Preston Garcia is a 81 y o  female  Assessment/Plan:    Loss of awareness/consciousness  By history, origin of her episodes of loss of awareness 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  Routine EEG demonstrated epileptiform potential   A prescription for levetiracetam 250 mg b i d  has been sent to her pharmacy, but she has not yet had the chance to  the medication  She will start the medication today and give the office a call in 2 weeks with a status update, or she may call sooner if necessary  Potential side effect profile of levetiracetam reviewed today with the patient and her   She agreed to refrain from driving until cleared by Neurology  She will follow up in 4-5 weeks  Subjective:    HPI  The patient is a right-handed 56-year-old female who presents to the office for initial neurologic evaluation regarding episodes of momentary loss of consciousness/awareness  She was accompanied today by her , Linwood Mcdonald  In January 2017, she was walking to her car after a show when without warning, she suddenly blacked out and fell to the ground  She was immediately aware once she fell to the ground    She was with her friend who did not describe any witnessed seizure-like activity, and the patient did not have any bowel, bladder incontinence or tongue bite with the episode  She was not particularly confused following the episode  Then, in October 2017, she was sitting in a rocking chair talking to her daughter  The next thing she knew, her daughter was calling her name, asking what she was doing  According to her daughter, the patient suddenly stopped talking, her left hand fell to the side of the rocking chair, and she was unresponsive/staring for several seconds (though did not appear to lose consciousness)  The patient has no recollection of any loss of awareness, and again, there was no overt seizure-like activity described  She had 2 suspicious episodes this past July  One occurred while she was out to lunch with friends when she stopped talking mid sentence for a brief period, apparently put her hands on her eyes, then started talking again, continuing her sentence  The latest episode occurred a few weeks later when she was in [place], [place] visiting her daughter  She was walking outside when she felt as though she felt an ill-defined strange sensation, grabbed onto the side of a wall, and then fell with momentary loss of awareness (unclear if she lost consciousness completely), hitting her head  She was evaluated at the emergency department and discharged  She denies any particular warning prior to the seizures except for the ill-defined feeling prior to her latest episode  She denies any history of significant head trauma or family history of epilepsy  She had an MRI of her brain performed which was unremarkable except for some mild chronic small vessel cerebrovascular ischemic disease  She also had a routine EEG performed, which did demonstrate bilateral independent anterotemporal sharp activity, suggestive of epileptiform potential   She also had intracranial MRA and carotid ultrasound performed, which were unremarkable studies        [facility] discussed the situation with Dr Zohra Madrigal over the phone earlier this week  There was a prescription sent for levetiracetam 250 mg b i d  to her pharmacy, but she has not yet started the medication  She has refrained from driving since July 6395  Past Medical History:   Diagnosis Date    Diabetes mellitus ([address] )     Dyslipidemia     Hemochromatosis     Hypertension      Past Surgical History:   Procedure Laterality Date    KNEE SURGERY Bilateral      Social History     Social History    Marital status: /Civil Union     Spouse name: N/A    Number of children: N/A    Years of education: N/A     Social History Main Topics    Smoking status: Never Smoker    Smokeless tobacco: Never Used    Alcohol use Yes    Drug use: No    Sexual activity: Not Asked     Other Topics Concern    None     Social History Narrative    None     Family History   Problem Relation Age of Onset    No Known Problems Family      Allergies   Allergen Reactions    No Active Allergies        Current Outpatient Prescriptions:     aspirin (ECOTRIN LOW STRENGTH) 81 mg EC tablet, Take 1 tablet (81 mg total) by mouth daily, Disp: , Rfl: 0    atorvastatin (LIPITOR) 10 mg tablet, TK 1 T PO QD, Disp: , Rfl: 0    glucose blood test strip, Pt to test blood sugars twice daily, Disp: 100 each, Rfl: 3    irbesartan (AVAPRO) 75 mg tablet, Take 1 tablet (75 mg total) by mouth daily, Disp: 30 tablet, Rfl: 5    Lancet Devices (ACCU-CHEK SOFTCLIX) lancets, Use as instructed, Disp: 100 each, Rfl: 3    LANCETS SUPER THIN 28G MISC, use 1 lancet by Misc  (Non-Drug; Combo Route) route once TEST EVERY DAY, Disp: , Rfl:     levETIRAcetam (KEPPRA) 250 mg tablet, Take 1 tablet (250 mg total) by mouth 2 (two) times a day, Disp: 60 tablet, Rfl: 3    sitaGLIPtin (JANUVIA) 100 mg tablet, Take 100 mg by mouth daily  , Disp: , Rfl:     Objective:    Blood pressure 131/65, pulse 84, resp  rate 16, height 5' 3" (1 6 m), weight 74 1 kg (163 lb 6 4 oz)      Physical Exam  General:  Patient is well-developed, well-nourished, and in no acute distress  HEENT:  Head normocephalic  Eyes anicteric  Cardiovascular:  With regular rhythm  No anterior neck bruits auscultated  Lungs:  Clear to auscultation  Abdomen:  Soft, nontender, with bowel sounds present  Extremities:  With no significant edema  Neurological Exam  Mental Status:  Patient was alert, pleasantly interactive, and appropriately conversational   No obvious symbolic language difficulty or dysarthria, and the patient was fully oriented  Unremarkable spontaneous gait with patient able to walk on heels, on toes, and in tandem without difficulty  Cranial Nerves:   I: Olfactory sense intact bilaterally  II: Visual fields full to confrontation  Pupils equal, round, reactive to light with normal accomodation  Fundus: normal cup to disc ratio with no edema  III,IV,VI: extraocular muscles EOMI, no nystagmus  V: Sensation in the V1 through V3 distributions intact to pinprick and light touch bilaterally  VII: Face is symmetric with no weakness noted  VIII: Audition intact to finger rub bilaterally  IX/X: Uvula midline  Soft palate elevation symmetric  XII: Tongue midline with no atrophy or fasciculations with appropriate movement  Coordination:  Romberg was negative and patient able to perform normal finger-to-nose and heel-to-shin without ataxia  Motor testing with full symmetrical strength throughout the 4 extremities with no upper extremity drift  Sensory testing grossly intact to pin, position, and vibration throughout  Muscle stretch reflexes: 1+ throughout the upper extremities bilaterally, 2 at the bilateral knees, and trace at the bilateral ankles  Toe response was downgoing on the right and mute on the left  ROS:    Review of Systems   Constitutional: Negative  Negative for appetite change and fever  HENT: Positive for tinnitus  Negative for hearing loss, trouble swallowing and voice change  Eyes: Negative  Negative for photophobia and pain  Respiratory: Negative  Negative for shortness of breath  Cardiovascular: Negative  Negative for palpitations  Gastrointestinal: Negative  Negative for nausea and vomiting  Endocrine: Negative  Negative for cold intolerance and heat intolerance  Genitourinary: Positive for frequency and urgency  Negative for dysuria  Musculoskeletal: Negative  Negative for myalgias and neck pain  Skin: Negative  Negative for rash  Allergic/Immunologic: Negative  Neurological: Positive for seizures (7/2018)  Negative for dizziness (at times), tremors, syncope, facial asymmetry, speech difficulty, weakness, light-headedness (at times), numbness and headaches  Hematological: Negative  Does not bruise/bleed easily  Psychiatric/Behavioral: Negative  Negative for confusion, hallucinations and sleep disturbance  I personally reviewed the ROS that was entered by the medical assistant

## 2018-10-03 NOTE — PROGRESS NOTES
Patient is here today for a new patient consult for seizures    Patient ID: Jayme Pa is a 81 y o  female  Assessment/Plan:    Loss of awareness/consciousness  By history, origin of her episodes of loss of awareness 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  Routine EEG demonstrated epileptiform potential   A prescription for levetiracetam 250 mg b i d  has been sent to her pharmacy, but she has not yet had the chance to  the medication  She will start the medication today and give the office a call in 2 weeks with a status update, or she may call sooner if necessary  Potential side effect profile of levetiracetam reviewed today with the patient and her   She agreed to refrain from driving until cleared by Neurology  --CBC, CMP, and TSH reviewed and unremarkable  She will follow up in 4-5 weeks  I spent a total of 45 min with the patient and her family with greater than 50% of that time spent counseling and coordinating her care, specifically discussing her diagnosis and additional tests as detailed above  Subjective:    HPI  The patient is a right-handed 56-year-old female who presents to the office for initial neurologic evaluation regarding episodes of momentary loss of consciousness/awareness  She was accompanied today by her , Bette Taylor  In January 2017, she was walking to her car after a show when without warning, she suddenly blacked out and fell to the ground  She was immediately aware once she fell to the ground  She was with her friend who did not describe any witnessed seizure-like activity, and the patient did not have any bowel, bladder incontinence or tongue bite with the episode  She was not particularly confused following the episode  Then, in October 2017, she was sitting in a rocking chair talking to her daughter  The next thing she knew, her daughter was calling her name, asking what she was doing    According to her daughter, the patient suddenly stopped talking, her left hand fell to the side of the rocking chair, and she was unresponsive/staring for several seconds (though did not appear to lose consciousness)  The patient has no recollection of any loss of awareness, and again, there was no overt seizure-like activity described  She had 2 suspicious episodes this past July  One occurred while she was out to lunch with friends when she stopped talking mid sentence for a brief period, apparently put her hands on her eyes, then started talking again, continuing her sentence  The latest episode occurred a few weeks later when she was in [place], [place] visiting her daughter  She was walking outside when she felt as though she felt an ill-defined strange sensation, grabbed onto the side of a wall, and then fell with momentary loss of awareness (unclear if she lost consciousness completely), hitting her head  She was evaluated at the emergency department and discharged  She denies any particular warning prior to the seizures except for the ill-defined feeling prior to her latest episode  She denies any history of significant head trauma or family history of epilepsy  She had an MRI of her brain performed which was unremarkable except for some mild chronic small vessel cerebrovascular ischemic disease  She also had a routine EEG performed, which did demonstrate bilateral independent anterotemporal sharp activity, suggestive of epileptiform potential   She also had intracranial MRA and carotid ultrasound performed, which were unremarkable studies  Dr Regulo Ochoa discussed the situation with Dr Portillo Márquez over the phone earlier this week  There was a prescription sent for levetiracetam 250 mg b i d  to her pharmacy, but she has not yet started the medication  She has refrained from driving since July 1426      Past Medical History:   Diagnosis Date    Diabetes mellitus ([facility]     Dyslipidemia     Hemochromatosis     Hypertension      Past Surgical History:   Procedure Laterality Date    KNEE SURGERY Bilateral      Social History     Social History    Marital status: /Civil Union     Spouse name: N/A    Number of children: N/A    Years of education: N/A     Social History Main Topics    Smoking status: Never Smoker    Smokeless tobacco: Never Used    Alcohol use Yes    Drug use: No    Sexual activity: Not Asked     Other Topics Concern    None     Social History Narrative    None     Family History   Problem Relation Age of Onset    No Known Problems Family      Allergies   Allergen Reactions    No Active Allergies        Current Outpatient Prescriptions:     aspirin (ECOTRIN LOW STRENGTH) 81 mg EC tablet, Take 1 tablet (81 mg total) by mouth daily, Disp: , Rfl: 0    atorvastatin (LIPITOR) 10 mg tablet, TK 1 T PO QD, Disp: , Rfl: 0    glucose blood test strip, Pt to test blood sugars twice daily, Disp: 100 each, Rfl: 3    irbesartan (AVAPRO) 75 mg tablet, Take 1 tablet (75 mg total) by mouth daily, Disp: 30 tablet, Rfl: 5    Lancet Devices (ACCU-CHEK SOFTCLIX) lancets, Use as instructed, Disp: 100 each, Rfl: 3    LANCETS SUPER THIN 28G MISC, use 1 lancet by Misc  (Non-Drug; Combo Route) route once TEST EVERY DAY, Disp: , Rfl:     levETIRAcetam (KEPPRA) 250 mg tablet, Take 1 tablet (250 mg total) by mouth 2 (two) times a day, Disp: 60 tablet, Rfl: 3    sitaGLIPtin (JANUVIA) 100 mg tablet, Take 100 mg by mouth daily  , Disp: , Rfl:     Objective:    Blood pressure 131/65, pulse 84, resp  rate 16, height 5' 3" (1 6 m), weight 74 1 kg (163 lb 6 4 oz)  Physical Exam  General:  Patient is well-developed, well-nourished, and in no acute distress  HEENT:  Head normocephalic  Eyes anicteric  Cardiovascular:  With regular rhythm  No anterior neck bruits auscultated  Lungs:  Clear to auscultation  Abdomen:  Soft, nontender, with bowel sounds present    Extremities:  With no significant edema  Neurological Exam  Mental Status:  Patient was alert, pleasantly interactive, and appropriately conversational   No obvious symbolic language difficulty or dysarthria, and the patient was fully oriented  Unremarkable spontaneous gait with patient able to walk on heels, on toes, and in tandem without difficulty  Cranial Nerves:   I: Olfactory sense intact bilaterally  II: Visual fields full to confrontation  Pupils equal, round, reactive to light with normal accomodation  Fundus: normal cup to disc ratio with no edema  III,IV,VI: extraocular muscles EOMI, no nystagmus  V: Sensation in the V1 through V3 distributions intact to pinprick and light touch bilaterally  VII: Face is symmetric with no weakness noted  VIII: Audition intact to finger rub bilaterally  IX/X: Uvula midline  Soft palate elevation symmetric  XII: Tongue midline with no atrophy or fasciculations with appropriate movement  Coordination:  Romberg was negative and patient able to perform normal finger-to-nose and heel-to-shin without ataxia  Motor testing with full symmetrical strength throughout the 4 extremities with no upper extremity drift  Sensory testing grossly intact to pin, position, and vibration throughout  Muscle stretch reflexes: 1+ throughout the upper extremities bilaterally, 2 at the bilateral knees, and trace at the bilateral ankles  Toe response was downgoing on the right and mute on the left  ROS:    Review of Systems   Constitutional: Negative  Negative for appetite change and fever  HENT: Positive for tinnitus  Negative for hearing loss, trouble swallowing and voice change  Eyes: Negative  Negative for photophobia and pain  Respiratory: Negative  Negative for shortness of breath  Cardiovascular: Negative  Negative for palpitations  Gastrointestinal: Negative  Negative for nausea and vomiting  Endocrine: Negative    Negative for cold intolerance and heat intolerance  Genitourinary: Positive for frequency and urgency  Negative for dysuria  Musculoskeletal: Negative  Negative for myalgias and neck pain  Skin: Negative  Negative for rash  Allergic/Immunologic: Negative  Neurological: Positive for seizures (7/2018)  Negative for dizziness (at times), tremors, syncope, facial asymmetry, speech difficulty, weakness, light-headedness (at times), numbness and headaches  Hematological: Negative  Does not bruise/bleed easily  Psychiatric/Behavioral: Negative  Negative for confusion, hallucinations and sleep disturbance  I personally reviewed the ROS that was entered by the medical assistant

## 2018-10-03 NOTE — PATIENT INSTRUCTIONS
Start Keppra 250mg twice daily  Call the office in 2 weeks with an update about how you are doing on the medicine

## 2018-10-03 NOTE — ASSESSMENT & PLAN NOTE
By history, origin of her episodes of loss of awareness 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  Routine EEG demonstrated epileptiform potential   A prescription for levetiracetam 250 mg b i d  has been sent to her pharmacy, but she has not yet had the chance to  the medication  She will start the medication today and give the office a call in 2 weeks with a status update, or she may call sooner if necessary  Potential side effect profile of levetiracetam reviewed today with the patient and her   She agreed to refrain from driving until cleared by Neurology  --CBC, CMP, and TSH reviewed and unremarkable

## 2018-10-03 NOTE — LETTER
October 3, 2018     María Marcos MD  [facility]    Patient: Benson Mclean   YOB: 1935   Date of Visit: 10/3/2018       Dear Dr Richar Martinez:    Thank you for referring Cricket Dowell to me for evaluation  Below are my notes for this consultation  If you have questions, please do not hesitate to call me  I look forward to following your patient along with you  Sincerely,        Kayley Bates PA-C        CC: No Recipients  Kalyey Bates PA-C  10/3/2018 12:13 PM  Sign at close encounter  Patient is here today for a new patient consult for seizures    Patient ID: Benson Mclean is a 81 y o  female  Assessment/Plan:    Loss of awareness/consciousness  By history, origin of her episodes of loss of awareness 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  Routine EEG demonstrated epileptiform potential   A prescription for levetiracetam 250 mg b i d  has been sent to her pharmacy, but she has not yet had the chance to  the medication  She will start the medication today and give the office a call in 2 weeks with a status update, or she may call sooner if necessary  Potential side effect profile of levetiracetam reviewed today with the patient and her   She agreed to refrain from driving until cleared by Neurology  --CBC, CMP, and TSH reviewed and unremarkable  She will follow up in 4-5 weeks  I spent a total of 45 min with the patient and her family with greater than 50% of that time spent counseling and coordinating her care, specifically discussing her diagnosis and additional tests as detailed above  Subjective:    HPI  The patient is a right-handed 28-year-old female who presents to the office for initial neurologic evaluation regarding episodes of momentary loss of consciousness/awareness  She was accompanied today by her , Mayelin Sim      In January 2017, she was walking to her car after a show when without warning, she suddenly blacked out and fell to the ground  She was immediately aware once she fell to the ground  She was with her friend who did not describe any witnessed seizure-like activity, and the patient did not have any bowel, bladder incontinence or tongue bite with the episode  She was not particularly confused following the episode  Then, in October 2017, she was sitting in a rocking chair talking to her daughter  The next thing she knew, her daughter was calling her name, asking what she was doing  According to her daughter, the patient suddenly stopped talking, her left hand fell to the side of the rocking chair, and she was unresponsive/staring for several seconds (though did not appear to lose consciousness)  The patient has no recollection of any loss of awareness, and again, there was no overt seizure-like activity described  She had 2 suspicious episodes this past July  One occurred while she was out to lunch with friends when she stopped talking mid sentence for a brief period, apparently put her hands on her eyes, then started talking again, continuing her sentence  The latest episode occurred a few weeks later when she was in [place], [place] visiting her daughter  She was walking outside when she felt as though she felt an ill-defined strange sensation, grabbed onto the side of a wall, and then fell with momentary loss of awareness (unclear if she lost consciousness completely), hitting her head  She was evaluated at the emergency department and discharged  She denies any particular warning prior to the seizures except for the ill-defined feeling prior to her latest episode  She denies any history of significant head trauma or family history of epilepsy  She had an MRI of her brain performed which was unremarkable except for some mild chronic small vessel cerebrovascular ischemic disease    She also had a routine EEG performed, which did demonstrate bilateral independent anterotemporal sharp activity, suggestive of epileptiform potential   She also had intracranial MRA and carotid ultrasound performed, which were unremarkable studies  Dr Inessa Michael discussed the situation with Dr Louie Orozco over the phone earlier this week  There was a prescription sent for levetiracetam 250 mg b i d  to her pharmacy, but she has not yet started the medication  She has refrained from driving since July 6257  Past Medical History:   Diagnosis Date    Diabetes mellitus ([address] )     Dyslipidemia     Hemochromatosis     Hypertension      Past Surgical History:   Procedure Laterality Date    KNEE SURGERY Bilateral      Social History     Social History    Marital status: /Civil Union     Spouse name: N/A    Number of children: N/A    Years of education: N/A     Social History Main Topics    Smoking status: Never Smoker    Smokeless tobacco: Never Used    Alcohol use Yes    Drug use: No    Sexual activity: Not Asked     Other Topics Concern    None     Social History Narrative    None     Family History   Problem Relation Age of Onset    No Known Problems Family      Allergies   Allergen Reactions    No Active Allergies        Current Outpatient Prescriptions:     aspirin (ECOTRIN LOW STRENGTH) 81 mg EC tablet, Take 1 tablet (81 mg total) by mouth daily, Disp: , Rfl: 0    atorvastatin (LIPITOR) 10 mg tablet, TK 1 T PO QD, Disp: , Rfl: 0    glucose blood test strip, Pt to test blood sugars twice daily, Disp: 100 each, Rfl: 3    irbesartan (AVAPRO) 75 mg tablet, Take 1 tablet (75 mg total) by mouth daily, Disp: 30 tablet, Rfl: 5    Lancet Devices (ACCU-CHEK SOFTCLIX) lancets, Use as instructed, Disp: 100 each, Rfl: 3    LANCETS SUPER THIN 28G MISC, use 1 lancet by Misc  (Non-Drug; Combo Route) route once TEST EVERY DAY, Disp: , Rfl:     levETIRAcetam (KEPPRA) 250 mg tablet, Take 1 tablet (250 mg total) by mouth 2 (two) times a day, Disp: 60 tablet, Rfl: 3   sitaGLIPtin (JANUVIA) 100 mg tablet, Take 100 mg by mouth daily  , Disp: , Rfl:     Objective:    Blood pressure 131/65, pulse 84, resp  rate 16, height 5' 3" (1 6 m), weight 74 1 kg (163 lb 6 4 oz)  Physical Exam  General:  Patient is well-developed, well-nourished, and in no acute distress  HEENT:  Head normocephalic  Eyes anicteric  Cardiovascular:  With regular rhythm  No anterior neck bruits auscultated  Lungs:  Clear to auscultation  Abdomen:  Soft, nontender, with bowel sounds present  Extremities:  With no significant edema  Neurological Exam  Mental Status:  Patient was alert, pleasantly interactive, and appropriately conversational   No obvious symbolic language difficulty or dysarthria, and the patient was fully oriented  Unremarkable spontaneous gait with patient able to walk on heels, on toes, and in tandem without difficulty  Cranial Nerves:   I: Olfactory sense intact bilaterally  II: Visual fields full to confrontation  Pupils equal, round, reactive to light with normal accomodation  Fundus: normal cup to disc ratio with no edema  III,IV,VI: extraocular muscles EOMI, no nystagmus  V: Sensation in the V1 through V3 distributions intact to pinprick and light touch bilaterally  VII: Face is symmetric with no weakness noted  VIII: Audition intact to finger rub bilaterally  IX/X: Uvula midline  Soft palate elevation symmetric  XII: Tongue midline with no atrophy or fasciculations with appropriate movement  Coordination:  Romberg was negative and patient able to perform normal finger-to-nose and heel-to-shin without ataxia  Motor testing with full symmetrical strength throughout the 4 extremities with no upper extremity drift  Sensory testing grossly intact to pin, position, and vibration throughout  Muscle stretch reflexes: 1+ throughout the upper extremities bilaterally, 2 at the bilateral knees, and trace at the bilateral ankles         Toe response was downgoing on the right and mute on the left  ROS:    Review of Systems   Constitutional: Negative  Negative for appetite change and fever  HENT: Positive for tinnitus  Negative for hearing loss, trouble swallowing and voice change  Eyes: Negative  Negative for photophobia and pain  Respiratory: Negative  Negative for shortness of breath  Cardiovascular: Negative  Negative for palpitations  Gastrointestinal: Negative  Negative for nausea and vomiting  Endocrine: Negative  Negative for cold intolerance and heat intolerance  Genitourinary: Positive for frequency and urgency  Negative for dysuria  Musculoskeletal: Negative  Negative for myalgias and neck pain  Skin: Negative  Negative for rash  Allergic/Immunologic: Negative  Neurological: Positive for seizures (7/2018)  Negative for dizziness (at times), tremors, syncope, facial asymmetry, speech difficulty, weakness, light-headedness (at times), numbness and headaches  Hematological: Negative  Does not bruise/bleed easily  Psychiatric/Behavioral: Negative  Negative for confusion, hallucinations and sleep disturbance  I personally reviewed the ROS that was entered by the medical assistant

## 2018-10-18 ENCOUNTER — TELEPHONE (OUTPATIENT)
Dept: NEUROLOGY | Facility: CLINIC | Age: 83
End: 2018-10-18

## 2018-10-18 NOTE — TELEPHONE ENCOUNTER
Patient called the office as you requested with an update  She took the medication that was prescribed for her and she does not feel any different   She stated she is doing well and not have any issues

## 2018-10-19 DIAGNOSIS — G40.802 OTHER EPILEPSY WITHOUT STATUS EPILEPTICUS, NOT INTRACTABLE (HCC): ICD-10-CM

## 2018-10-19 RX ORDER — LEVETIRACETAM 500 MG/1
500 TABLET ORAL 2 TIMES DAILY
Qty: 90 TABLET | Refills: 3 | Status: SHIPPED | OUTPATIENT
Start: 2018-10-19 | End: 2018-11-12 | Stop reason: SDUPTHER

## 2018-10-19 NOTE — TELEPHONE ENCOUNTER
Spoke with the patient and explained to the patient that she should start taking 500 mg twice a day for better seizure coverage  So at this point until her 250 mg tablets run out she should take 2 of the 250 mg tablets twice a day  And Safia called in 500 mg tablets for her  She will then start taking 1 tablet of the 500 mg tablets twice a day

## 2018-10-19 NOTE — TELEPHONE ENCOUNTER
Good to hear  I would like to increase to a better dose as far as seizure coverage (250mg bid is a very low dose)  Please instruct her to double her pills and I will send 500mg tabs to her pharmacy of choice  Let me know, thanks

## 2018-11-12 ENCOUNTER — OFFICE VISIT (OUTPATIENT)
Dept: NEUROLOGY | Facility: CLINIC | Age: 83
End: 2018-11-12
Payer: MEDICARE

## 2018-11-12 VITALS
HEART RATE: 71 BPM | HEIGHT: 64 IN | WEIGHT: 163.8 LBS | DIASTOLIC BLOOD PRESSURE: 77 MMHG | RESPIRATION RATE: 16 BRPM | BODY MASS INDEX: 27.96 KG/M2 | SYSTOLIC BLOOD PRESSURE: 151 MMHG

## 2018-11-12 DIAGNOSIS — G40.802 OTHER EPILEPSY WITHOUT STATUS EPILEPTICUS, NOT INTRACTABLE (HCC): ICD-10-CM

## 2018-11-12 PROCEDURE — 99213 OFFICE O/P EST LOW 20 MIN: CPT | Performed by: PHYSICIAN ASSISTANT

## 2018-11-12 RX ORDER — LEVETIRACETAM 750 MG/1
750 TABLET ORAL 2 TIMES DAILY
Qty: 60 TABLET | Refills: 5 | Status: SHIPPED | OUTPATIENT
Start: 2018-11-12 | End: 2018-12-20 | Stop reason: SDUPTHER

## 2018-11-12 NOTE — PROGRESS NOTES
Patient is here today for her seizure's    Patient ID: Anel Trejo is a 81 y o  female  Assessment/Plan:    Loss of awareness/consciousness  She has been tolerating the levetiracetam well at 500 mg b i d  She believes she had a suspicious episode of loss of awareness in [place] approximately 1 month ago  Feel it appropriate to slightly increase her levetiracetam dose for better coverage  --increase levetiracetam to 750 mg b i d  Per her latest labs, she has adequate renal function  --she will call the office in 2-3 weeks for with a status update and will call should she have any further suspicious episodes  --she will continue to refrain from driving until she is episode free for 6 months and she verbalized understanding  She will follow up in early January 2019 prior to her departure to [place]  Subjective:    HPI   The patient is an 52-year-old female who presents to the office for ongoing care regarding her episodes of loss of awareness with abnormal EEG, suggestive of epileptogenic potential   She was again accompanied by her , Rian Diver Dr Brynn Mcburney personally interviewed and examined the patient and developed the assessment and plan of care  Since last visit, we increased her levetiracetam to 500 mg b i d , which she has tolerated well, with the exception of some transient increased irritability during the first week after the increase  She believes that approximately 1 month ago while praying in [place] that she may have had a momentary episode of loss of awareness, which went unnoticed by her   She continues to refrain from driving  She plans to go to [place] with her  from mid January 2019 through April 2019      Past Medical History:   Diagnosis Date    Diabetes mellitus ([address] )     Dyslipidemia     Hemochromatosis     Hypertension      Past Surgical History:   Procedure Laterality Date    KNEE SURGERY Bilateral      Social History     Social History    Marital status: /Civil Union     Spouse name: N/A    Number of children: N/A    Years of education: N/A     Social History Main Topics    Smoking status: Never Smoker    Smokeless tobacco: Never Used    Alcohol use Yes    Drug use: No    Sexual activity: Not Asked     Other Topics Concern    None     Social History Narrative    None     Family History   Problem Relation Age of Onset    No Known Problems Family      Allergies   Allergen Reactions    No Active Allergies        Current Outpatient Prescriptions:     aspirin (ECOTRIN LOW STRENGTH) 81 mg EC tablet, Take 1 tablet (81 mg total) by mouth daily, Disp: , Rfl: 0    atorvastatin (LIPITOR) 10 mg tablet, TK 1 T PO QD, Disp: , Rfl: 0    glucose blood test strip, Pt to test blood sugars twice daily, Disp: 100 each, Rfl: 3    irbesartan (AVAPRO) 75 mg tablet, Take 1 tablet (75 mg total) by mouth daily, Disp: 30 tablet, Rfl: 5    Lancet Devices (ACCU-CHEK SOFTCLIX) lancets, Use as instructed, Disp: 100 each, Rfl: 3    LANCETS SUPER THIN 28G MISC, use 1 lancet by Misc  (Non-Drug; Combo Route) route once TEST EVERY DAY, Disp: , Rfl:     levETIRAcetam (KEPPRA) 750 mg tablet, Take 1 tablet (750 mg total) by mouth 2 (two) times a day, Disp: 60 tablet, Rfl: 5    sitaGLIPtin (JANUVIA) 100 mg tablet, Take 100 mg by mouth daily  , Disp: , Rfl:     Objective:    Blood pressure 151/77, pulse 71, resp  rate 16, height 5' 3 5" (1 613 m), weight 74 3 kg (163 lb 12 8 oz)  Physical Exam  Patient appears stated age and is in no acute distress  HEENT:  Head normocephalic  Eyes anicteric  Heart:  With regular rhythm  Lungs:  Clear to auscultation  Neurological Exam  Patient is alert and pleasantly interactive  No obvious symbolic language difficulty or dysarthria  Fully oriented  Unremarkable spontaneous gait  ROS:    Review of Systems   Constitutional: Positive for fatigue  Negative for appetite change and fever     HENT: Positive for tinnitus  Negative for hearing loss, trouble swallowing and voice change  Eyes: Negative  Negative for photophobia and pain  Respiratory: Negative  Negative for shortness of breath  Cardiovascular: Negative  Negative for palpitations  Gastrointestinal: Negative  Negative for nausea and vomiting  Endocrine: Negative  Negative for cold intolerance and heat intolerance  Genitourinary: Positive for frequency and urgency  Negative for dysuria  Musculoskeletal: Negative  Negative for myalgias and neck pain  Skin: Negative  Negative for rash  Neurological: Positive for light-headedness (in the morning)  Negative for dizziness, tremors, seizures, syncope, facial asymmetry, speech difficulty, weakness, numbness and headaches  Hematological: Negative  Does not bruise/bleed easily  Psychiatric/Behavioral: Negative  Negative for confusion, hallucinations and sleep disturbance  I personally reviewed the ROS that was entered by the medical assistant

## 2018-11-12 NOTE — PATIENT INSTRUCTIONS
Increase levetiracetam to 750mg twice daily  Call the office if you cannot tolerate the increase or have any further episodes, and call in 2-3 weeks with an update

## 2018-11-12 NOTE — ASSESSMENT & PLAN NOTE
She has been tolerating the levetiracetam well at 500 mg b i d  She believes she had a suspicious episode of loss of awareness in [place] approximately 1 month ago  Feel it appropriate to slightly increase her levetiracetam dose for better coverage  --increase levetiracetam to 750 mg b i d  Per her latest labs, she has adequate renal function  --she will call the office in 2-3 weeks for with a status update and will call should she have any further suspicious episodes  --she will continue to refrain from driving until she is episode free for 6 months and she verbalized understanding

## 2018-11-12 NOTE — LETTER
November 12, 2018     Angelita Friedman MD  [address] [facility]    Patient: Wesley Alberto   YOB: 1935   Date of Visit: 11/12/2018       Dear Dr Kamara Re:    Thank you for referring Joseph Barrett to me for evaluation  Below are my notes for this consultation  If you have questions, please do not hesitate to call me  I look forward to following your patient along with you  Sincerely,        Skyler Bethea PA-C        CC: No Recipients  Skyler Bethea PA-C  11/12/2018 11:53 AM  Sign at close encounter  Patient is here today for her seizure's    Patient ID: Wesley Alberto is a 81 y o  female  Assessment/Plan:    Loss of awareness/consciousness  She has been tolerating the levetiracetam well at 500 mg b i d  She believes she had a suspicious episode of loss of awareness in [place] approximately 1 month ago  Feel it appropriate to slightly increase her levetiracetam dose for better coverage  --increase levetiracetam to 750 mg b i d  Per her latest labs, she has adequate renal function  --she will call the office in 2-3 weeks for with a status update and will call should she have any further suspicious episodes  --she will continue to refrain from driving until she is episode free for 6 months and she verbalized understanding  She will follow up in early January 2019 prior to her departure to [place]  Subjective:    HPI   The patient is an 60-year-old female who presents to the office for ongoing care regarding her episodes of loss of awareness with abnormal EEG, suggestive of epileptogenic potential   She was again accompanied by her , Sommer Barney personally interviewed and examined the patient and developed the assessment and plan of care      Since last visit, we increased her levetiracetam to 500 mg b i d , which she has tolerated well, with the exception of some transient increased irritability during the first week after the increase  She believes that approximately 1 month ago while praying in [place] that she may have had a momentary episode of loss of awareness, which went unnoticed by her   She continues to refrain from driving  She plans to go to [place] with her  from mid January 2019 through April 2019  Past Medical History:   Diagnosis Date    Diabetes mellitus ([address] )     Dyslipidemia     Hemochromatosis     Hypertension      Past Surgical History:   Procedure Laterality Date    KNEE SURGERY Bilateral      Social History     Social History    Marital status: /Civil Union     Spouse name: N/A    Number of children: N/A    Years of education: N/A     Social History Main Topics    Smoking status: Never Smoker    Smokeless tobacco: Never Used    Alcohol use Yes    Drug use: No    Sexual activity: Not Asked     Other Topics Concern    None     Social History Narrative    None     Family History   Problem Relation Age of Onset    No Known Problems Family      Allergies   Allergen Reactions    No Active Allergies        Current Outpatient Prescriptions:     aspirin (ECOTRIN LOW STRENGTH) 81 mg EC tablet, Take 1 tablet (81 mg total) by mouth daily, Disp: , Rfl: 0    atorvastatin (LIPITOR) 10 mg tablet, TK 1 T PO QD, Disp: , Rfl: 0    glucose blood test strip, Pt to test blood sugars twice daily, Disp: 100 each, Rfl: 3    irbesartan (AVAPRO) 75 mg tablet, Take 1 tablet (75 mg total) by mouth daily, Disp: 30 tablet, Rfl: 5    Lancet Devices (ACCU-CHEK SOFTCLIX) lancets, Use as instructed, Disp: 100 each, Rfl: 3    LANCETS SUPER THIN 28G MISC, use 1 lancet by Misc  (Non-Drug; Combo Route) route once TEST EVERY DAY, Disp: , Rfl:     levETIRAcetam (KEPPRA) 750 mg tablet, Take 1 tablet (750 mg total) by mouth 2 (two) times a day, Disp: 60 tablet, Rfl: 5    sitaGLIPtin (JANUVIA) 100 mg tablet, Take 100 mg by mouth daily  , Disp: , Rfl:     Objective:    Blood pressure 151/77, pulse 71, resp  rate 16, height 5' 3 5" (1 613 m), weight 74 3 kg (163 lb 12 8 oz)  Physical Exam  Patient appears stated age and is in no acute distress  HEENT:  Head normocephalic  Eyes anicteric  Heart:  With regular rhythm  Lungs:  Clear to auscultation  Neurological Exam  Patient is alert and pleasantly interactive  No obvious symbolic language difficulty or dysarthria  Fully oriented  Unremarkable spontaneous gait  ROS:    Review of Systems   Constitutional: Positive for fatigue  Negative for appetite change and fever  HENT: Positive for tinnitus  Negative for hearing loss, trouble swallowing and voice change  Eyes: Negative  Negative for photophobia and pain  Respiratory: Negative  Negative for shortness of breath  Cardiovascular: Negative  Negative for palpitations  Gastrointestinal: Negative  Negative for nausea and vomiting  Endocrine: Negative  Negative for cold intolerance and heat intolerance  Genitourinary: Positive for frequency and urgency  Negative for dysuria  Musculoskeletal: Negative  Negative for myalgias and neck pain  Skin: Negative  Negative for rash  Neurological: Positive for light-headedness (in the morning)  Negative for dizziness, tremors, seizures, syncope, facial asymmetry, speech difficulty, weakness, numbness and headaches  Hematological: Negative  Does not bruise/bleed easily  Psychiatric/Behavioral: Negative  Negative for confusion, hallucinations and sleep disturbance  I personally reviewed the ROS that was entered by the medical assistant

## 2018-11-29 DIAGNOSIS — I10 HYPERTENSION, UNSPECIFIED TYPE: ICD-10-CM

## 2018-11-29 DIAGNOSIS — E78.5 HYPERLIPIDEMIA, UNSPECIFIED HYPERLIPIDEMIA TYPE: Primary | ICD-10-CM

## 2018-11-29 RX ORDER — IRBESARTAN 75 MG/1
TABLET ORAL
Qty: 90 TABLET | Refills: 5 | Status: SHIPPED | OUTPATIENT
Start: 2018-11-29 | End: 2019-01-03 | Stop reason: SDUPTHER

## 2018-11-29 RX ORDER — ATORVASTATIN CALCIUM 10 MG/1
TABLET, FILM COATED ORAL
Qty: 90 TABLET | Refills: 0 | Status: SHIPPED | OUTPATIENT
Start: 2018-11-29 | End: 2018-12-17 | Stop reason: SDUPTHER

## 2018-12-17 DIAGNOSIS — E78.5 HYPERLIPIDEMIA, UNSPECIFIED HYPERLIPIDEMIA TYPE: ICD-10-CM

## 2018-12-17 DIAGNOSIS — I10 HYPERTENSION, UNSPECIFIED TYPE: ICD-10-CM

## 2018-12-17 DIAGNOSIS — E11.9 TYPE 2 DIABETES MELLITUS WITHOUT COMPLICATION, UNSPECIFIED WHETHER LONG TERM INSULIN USE (HCC): Primary | ICD-10-CM

## 2018-12-17 RX ORDER — ATORVASTATIN CALCIUM 10 MG/1
10 TABLET, FILM COATED ORAL DAILY
Qty: 90 TABLET | Refills: 0 | Status: SHIPPED | OUTPATIENT
Start: 2018-12-17 | End: 2019-01-03 | Stop reason: SDUPTHER

## 2018-12-17 NOTE — TELEPHONE ENCOUNTER
Pt called for refills on januvia   She was seen for regular check up 9/2018 and will schedule 6 month f/ when she returns from [place] in march

## 2018-12-20 DIAGNOSIS — G40.802 OTHER EPILEPSY WITHOUT STATUS EPILEPTICUS, NOT INTRACTABLE (HCC): ICD-10-CM

## 2018-12-20 RX ORDER — LEVETIRACETAM 750 MG/1
750 TABLET ORAL 2 TIMES DAILY
Qty: 60 TABLET | Refills: 5 | Status: SHIPPED | OUTPATIENT
Start: 2018-12-20 | End: 2019-01-03 | Stop reason: SDUPTHER

## 2018-12-21 ENCOUNTER — TELEPHONE (OUTPATIENT)
Dept: FAMILY MEDICINE CLINIC | Facility: CLINIC | Age: 83
End: 2018-12-21

## 2019-01-03 DIAGNOSIS — E78.5 HYPERLIPIDEMIA, UNSPECIFIED HYPERLIPIDEMIA TYPE: ICD-10-CM

## 2019-01-03 DIAGNOSIS — G40.802 OTHER EPILEPSY WITHOUT STATUS EPILEPTICUS, NOT INTRACTABLE (HCC): ICD-10-CM

## 2019-01-03 DIAGNOSIS — I10 HYPERTENSION, UNSPECIFIED TYPE: ICD-10-CM

## 2019-01-03 RX ORDER — ATORVASTATIN CALCIUM 10 MG/1
10 TABLET, FILM COATED ORAL DAILY
Qty: 120 TABLET | Refills: 0 | Status: SHIPPED | OUTPATIENT
Start: 2019-01-03 | End: 2019-05-02 | Stop reason: SDUPTHER

## 2019-01-03 RX ORDER — IRBESARTAN 75 MG/1
75 TABLET ORAL DAILY
Qty: 120 TABLET | Refills: 0 | Status: SHIPPED | OUTPATIENT
Start: 2019-01-03 | End: 2019-06-03 | Stop reason: SDUPTHER

## 2019-01-03 RX ORDER — LEVETIRACETAM 750 MG/1
750 TABLET ORAL 2 TIMES DAILY
Qty: 240 TABLET | Refills: 0 | Status: SHIPPED | OUTPATIENT
Start: 2019-01-03 | End: 2019-04-11 | Stop reason: SDUPTHER

## 2019-01-03 NOTE — TELEPHONE ENCOUNTER
Pt goes to [facility] for 4 months soon and she was told by pharmacy she needs a 4 month vacation script sent over

## 2019-01-08 ENCOUNTER — OFFICE VISIT (OUTPATIENT)
Dept: NEUROLOGY | Facility: CLINIC | Age: 84
End: 2019-01-08
Payer: MEDICARE

## 2019-01-08 VITALS
RESPIRATION RATE: 17 BRPM | SYSTOLIC BLOOD PRESSURE: 130 MMHG | HEART RATE: 86 BPM | WEIGHT: 162.8 LBS | BODY MASS INDEX: 28.84 KG/M2 | HEIGHT: 63 IN | DIASTOLIC BLOOD PRESSURE: 72 MMHG

## 2019-01-08 DIAGNOSIS — G40.209 PARTIAL SYMPTOMATIC EPILEPSY WITH COMPLEX PARTIAL SEIZURES, NOT INTRACTABLE, WITHOUT STATUS EPILEPTICUS (HCC): Primary | ICD-10-CM

## 2019-01-08 PROCEDURE — 99213 OFFICE O/P EST LOW 20 MIN: CPT | Performed by: PSYCHIATRY & NEUROLOGY

## 2019-01-08 NOTE — ASSESSMENT & PLAN NOTE
Continues to do very well  No further reported events  Last suspicious event occurred in [facility] on October 14, 2018, resulting in advancement in her levetiracetam schedule  Continues on her current levetiracetam schedule with no adverse side effects  --continue levetiracetam 750 mg twice daily  --with her now chronically on levetiracetam, CBC, CMP and levetiracetam level  --patient continues to refrain from driving with forms to be submitted to [place] and patient to obtain a photo ID for now  Aware she will be eligible for return of her license when she is a full six months free of events

## 2019-01-08 NOTE — LETTER
January 8, 2019     Quiana Bojorquez MD  [address] [facility] [address]    Patient: Semaj Powell   YOB: 1935   Date of Visit: 1/8/2019       Dear Dr Adriana Carrasco: Thank you for referring Minetta Kussmaul to me for evaluation  Below are my notes for this consultation  If you have questions, please do not hesitate to call me  I look forward to following your patient along with you  Sincerely,        Zara Rodríguez MD        CC: No Recipients  aZra Rodríguez MD  1/8/2019 11:43 AM  Sign at close encounter  Patient is here today for her seizures    Patient ID: Semaj Powell is a 81 y o  female  Assessment/Plan:    Partial symptomatic epilepsy with complex partial seizures, not intractable, without status epilepticus ([facility] [address] )  Continues to do very well  No further reported events  Last suspicious event occurred in [facility] on October 14, 2018, resulting in advancement in her levetiracetam schedule  Continues on her current levetiracetam schedule with no adverse side effects  --continue levetiracetam 750 mg twice daily  --with her now chronically on levetiracetam, CBC, CMP and levetiracetam level  --patient continues to refrain from driving with forms to be submitted to [employer] and patient to obtain a photo ID for now  Aware she will be eligible for return of her license when she is a full six months free of events  She will follow up in four months  Subjective:    HPI  Patient, 81years of age and right-handed, is being followed for episodes of impaired awareness in the presence of an epileptogenic EEG  She was accompanied today once again by her   Since the increase in her levetiracetam to 750 mg twice daily, she has had no further suspicious events    Her last suspicious event occurred in [facility] on October 14, 2018, which did result in the increase in her levetiracetam   She reports no symptoms that would suggest any associated adverse side effects  Baseline studies reviewed  MRI brain unrevealing as to a potential focus  Also had unremarkable intracranial MRA and carotid ultrasound  EEG, however, telling a demonstrating bilateral independent anterior temporal sharp activity, consistent with a complex partial seizure disorder  Patient has been appropriately refraining from driving  Past Medical History:   Diagnosis Date    Diabetes mellitus ([address] )     Dyslipidemia     Hemochromatosis     Hypertension      Past Surgical History:   Procedure Laterality Date    KNEE SURGERY Bilateral      Social History     Social History    Marital status: /Civil Union     Spouse name: N/A    Number of children: N/A    Years of education: N/A     Social History Main Topics    Smoking status: Never Smoker    Smokeless tobacco: Never Used    Alcohol use Yes    Drug use: No    Sexual activity: Not Asked     Other Topics Concern    None     Social History Narrative    None     Family History   Problem Relation Age of Onset    No Known Problems Family      Allergies   Allergen Reactions    No Active Allergies        Current Outpatient Prescriptions:     aspirin (ECOTRIN LOW STRENGTH) 81 mg EC tablet, Take 1 tablet (81 mg total) by mouth daily, Disp: , Rfl: 0    atorvastatin (LIPITOR) 10 mg tablet, Take 1 tablet (10 mg total) by mouth daily, Disp: 120 tablet, Rfl: 0    glucose blood test strip, Pt to test blood sugars twice daily, Disp: 100 each, Rfl: 3    irbesartan (AVAPRO) 75 mg tablet, Take 1 tablet (75 mg total) by mouth daily, Disp: 120 tablet, Rfl: 0    Lancet Devices (ACCU-CHEK SOFTCLIX) lancets, Use as instructed, Disp: 100 each, Rfl: 3    LANCETS SUPER THIN 28G MISC, use 1 lancet by Misc  (Non-Drug; Combo Route) route once TEST EVERY DAY, Disp: , Rfl:     levETIRAcetam (KEPPRA) 750 mg tablet, Take 1 tablet (750 mg total) by mouth 2 (two) times a day, Disp: 240 tablet, Rfl: 0    sitaGLIPtin (JANUVIA) 100 mg tablet, Take 1 tablet (100 mg total) by mouth daily, Disp: 90 tablet, Rfl: 1    Objective:    Blood pressure 130/72, pulse 86, resp  rate 17, height 5' 3" (1 6 m), weight 73 8 kg (162 lb 12 8 oz)  Physical Exam  Lungs clear to auscultation  Rhythm regular  Neurological Exam  Alert  Pleasantly interactive  Fully oriented  Unremarkable spontaneous gait  Cranial nerves one through 12 tested and grossly intact  Funduscopic examination with marginated discs  Accurate with finger-to-nose bilaterally  Good symmetrical strength throughout the four extremities with no upper extremity drift  Sensory testing grossly intact to pin and position throughout  Muscle stretch reflexes bilaterally two throughout the upper extremities, bilaterally one at the knees and bilaterally trace at the ankles  Toe response downgoing bilaterally  ROS:    Review of Systems   Constitutional: Negative  Negative for appetite change and fever  HENT: Positive for tinnitus  Negative for hearing loss, trouble swallowing and voice change  Eyes: Negative  Negative for photophobia and pain  Respiratory: Negative  Negative for shortness of breath  Cardiovascular: Negative  Negative for palpitations  Gastrointestinal: Negative  Negative for nausea and vomiting  Endocrine: Negative  Negative for cold intolerance and heat intolerance  Genitourinary: Negative  Negative for dysuria, frequency and urgency  Musculoskeletal: Negative  Negative for myalgias and neck pain  Skin: Negative  Negative for rash  Allergic/Immunologic: Negative  Neurological: Positive for light-headedness (slight lightheaded a second in the morning)  Negative for dizziness, tremors, seizures, syncope, facial asymmetry, speech difficulty, weakness, numbness and headaches  Hematological: Negative  Does not bruise/bleed easily  Psychiatric/Behavioral: Negative  Negative for confusion, hallucinations and sleep disturbance         I personally reviewed the ROS that was entered by the medical assistant  *Please note this document was created using voice recognition software and may contain sound-alike word errors  *

## 2019-01-08 NOTE — PROGRESS NOTES
Patient is here today for her seizures    Patient ID: Dionisio Anaya is a 81 y o  female  Assessment/Plan:    Partial symptomatic epilepsy with complex partial seizures, not intractable, without status epilepticus ([facility] )  Continues to do very well  No further reported events  Last suspicious event occurred in [facility] on October 14, 2018, resulting in advancement in her levetiracetam schedule  Continues on her current levetiracetam schedule with no adverse side effects  --continue levetiracetam 750 mg twice daily  --with her now chronically on levetiracetam, CBC, CMP and levetiracetam level  --patient continues to refrain from driving with forms to be submitted to [employer] and patient to obtain a photo ID for now  Aware she will be eligible for return of her license when she is a full six months free of events  She will follow up in four months  Subjective:    HPI  Patient, 81years of age and right-handed, is being followed for episodes of impaired awareness in the presence of an epileptogenic EEG  She was accompanied today once again by her   Since the increase in her levetiracetam to 750 mg twice daily, she has had no further suspicious events  Her last suspicious event occurred in [facility] on October 14, 2018, which did result in the increase in her levetiracetam   She reports no symptoms that would suggest any associated adverse side effects  Baseline studies reviewed  MRI brain unrevealing as to a potential focus  Also had unremarkable intracranial MRA and carotid ultrasound  EEG, however, telling a demonstrating bilateral independent anterior temporal sharp activity, consistent with a complex partial seizure disorder  Patient has been appropriately refraining from driving      Past Medical History:   Diagnosis Date    [facility]     Dyslipidemia     Hemochromatosis     Hypertension      Past Surgical History:   Procedure Laterality Date    KNEE SURGERY Bilateral      Social History     Social History    Marital status: /Civil Union     Spouse name: N/A    Number of children: N/A    Years of education: N/A     Social History Main Topics    Smoking status: Never Smoker    Smokeless tobacco: Never Used    Alcohol use Yes    Drug use: No    Sexual activity: Not Asked     Other Topics Concern    None     Social History Narrative    None     Family History   Problem Relation Age of Onset    No Known Problems Family      Allergies   Allergen Reactions    No Active Allergies        Current Outpatient Prescriptions:     aspirin (ECOTRIN LOW STRENGTH) 81 mg EC tablet, Take 1 tablet (81 mg total) by mouth daily, Disp: , Rfl: 0    atorvastatin (LIPITOR) 10 mg tablet, Take 1 tablet (10 mg total) by mouth daily, Disp: 120 tablet, Rfl: 0    glucose blood test strip, Pt to test blood sugars twice daily, Disp: 100 each, Rfl: 3    irbesartan (AVAPRO) 75 mg tablet, Take 1 tablet (75 mg total) by mouth daily, Disp: 120 tablet, Rfl: 0    Lancet Devices (ACCU-CHEK SOFTCLIX) lancets, Use as instructed, Disp: 100 each, Rfl: 3    LANCETS SUPER THIN 28G MISC, use 1 lancet by Misc  (Non-Drug; Combo Route) route once TEST EVERY DAY, Disp: , Rfl:     levETIRAcetam (KEPPRA) 750 mg tablet, Take 1 tablet (750 mg total) by mouth 2 (two) times a day, Disp: 240 tablet, Rfl: 0    sitaGLIPtin (JANUVIA) 100 mg tablet, Take 1 tablet (100 mg total) by mouth daily, Disp: 90 tablet, Rfl: 1    Objective:    Blood pressure 130/72, pulse 86, resp  rate 17, height 5' 3" (1 6 m), weight 73 8 kg (162 lb 12 8 oz)  Physical Exam  Lungs clear to auscultation  Rhythm regular  Neurological Exam  Alert  Pleasantly interactive  Fully oriented  Unremarkable spontaneous gait  Cranial nerves one through 12 tested and grossly intact  Funduscopic examination with marginated discs  Accurate with finger-to-nose bilaterally    Good symmetrical strength throughout the four extremities with no upper extremity drift  Sensory testing grossly intact to pin and position throughout  Muscle stretch reflexes bilaterally two throughout the upper extremities, bilaterally one at the knees and bilaterally trace at the ankles  Toe response downgoing bilaterally  ROS:    Review of Systems   Constitutional: Negative  Negative for appetite change and fever  HENT: Positive for tinnitus  Negative for hearing loss, trouble swallowing and voice change  Eyes: Negative  Negative for photophobia and pain  Respiratory: Negative  Negative for shortness of breath  Cardiovascular: Negative  Negative for palpitations  Gastrointestinal: Negative  Negative for nausea and vomiting  Endocrine: Negative  Negative for cold intolerance and heat intolerance  Genitourinary: Negative  Negative for dysuria, frequency and urgency  Musculoskeletal: Negative  Negative for myalgias and neck pain  Skin: Negative  Negative for rash  Allergic/Immunologic: Negative  Neurological: Positive for light-headedness (slight lightheaded a second in the morning)  Negative for dizziness, tremors, seizures, syncope, facial asymmetry, speech difficulty, weakness, numbness and headaches  Hematological: Negative  Does not bruise/bleed easily  Psychiatric/Behavioral: Negative  Negative for confusion, hallucinations and sleep disturbance  I personally reviewed the ROS that was entered by the medical assistant  *Please note this document was created using voice recognition software and may contain sound-alike word errors  *

## 2019-01-09 ENCOUNTER — APPOINTMENT (OUTPATIENT)
Dept: LAB | Facility: IMAGING CENTER | Age: 84
End: 2019-01-09
Payer: MEDICARE

## 2019-01-09 ENCOUNTER — TRANSCRIBE ORDERS (OUTPATIENT)
Dept: ADMINISTRATIVE | Facility: HOSPITAL | Age: 84
End: 2019-01-09

## 2019-01-09 DIAGNOSIS — G40.209 PARTIAL SYMPTOMATIC EPILEPSY WITH COMPLEX PARTIAL SEIZURES, NOT INTRACTABLE, WITHOUT STATUS EPILEPTICUS (HCC): ICD-10-CM

## 2019-01-09 LAB
ALBUMIN SERPL BCP-MCNC: 3.9 G/DL (ref 3.5–5)
ALP SERPL-CCNC: 58 U/L (ref 46–116)
ALT SERPL W P-5'-P-CCNC: 23 U/L (ref 12–78)
ANION GAP SERPL CALCULATED.3IONS-SCNC: 7 MMOL/L (ref 4–13)
AST SERPL W P-5'-P-CCNC: 15 U/L (ref 5–45)
BASOPHILS # BLD AUTO: 0.05 THOUSANDS/ΜL (ref 0–0.1)
BASOPHILS NFR BLD AUTO: 1 % (ref 0–1)
BILIRUB SERPL-MCNC: 0.5 MG/DL (ref 0.2–1)
BUN SERPL-MCNC: 15 MG/DL (ref 5–25)
CALCIUM SERPL-MCNC: 9.3 MG/DL (ref 8.3–10.1)
CHLORIDE SERPL-SCNC: 104 MMOL/L (ref 100–108)
CO2 SERPL-SCNC: 29 MMOL/L (ref 21–32)
CREAT SERPL-MCNC: 0.7 MG/DL (ref 0.6–1.3)
EOSINOPHIL # BLD AUTO: 0.16 THOUSAND/ΜL (ref 0–0.61)
EOSINOPHIL NFR BLD AUTO: 2 % (ref 0–6)
ERYTHROCYTE [DISTWIDTH] IN BLOOD BY AUTOMATED COUNT: 12.8 % (ref 11.6–15.1)
GFR SERPL CREATININE-BSD FRML MDRD: 81 ML/MIN/1.73SQ M
GLUCOSE P FAST SERPL-MCNC: 104 MG/DL (ref 65–99)
HCT VFR BLD AUTO: 42.7 % (ref 34.8–46.1)
HGB BLD-MCNC: 13 G/DL (ref 11.5–15.4)
IMM GRANULOCYTES # BLD AUTO: 0.01 THOUSAND/UL (ref 0–0.2)
IMM GRANULOCYTES NFR BLD AUTO: 0 % (ref 0–2)
LYMPHOCYTES # BLD AUTO: 1.73 THOUSANDS/ΜL (ref 0.6–4.47)
LYMPHOCYTES NFR BLD AUTO: 27 % (ref 14–44)
MCH RBC QN AUTO: 28.3 PG (ref 26.8–34.3)
MCHC RBC AUTO-ENTMCNC: 30.4 G/DL (ref 31.4–37.4)
MCV RBC AUTO: 93 FL (ref 82–98)
MONOCYTES # BLD AUTO: 0.41 THOUSAND/ΜL (ref 0.17–1.22)
MONOCYTES NFR BLD AUTO: 6 % (ref 4–12)
NEUTROPHILS # BLD AUTO: 4.18 THOUSANDS/ΜL (ref 1.85–7.62)
NEUTS SEG NFR BLD AUTO: 64 % (ref 43–75)
NRBC BLD AUTO-RTO: 0 /100 WBCS
PLATELET # BLD AUTO: 258 THOUSANDS/UL (ref 149–390)
PMV BLD AUTO: 11.1 FL (ref 8.9–12.7)
POTASSIUM SERPL-SCNC: 4.4 MMOL/L (ref 3.5–5.3)
PROT SERPL-MCNC: 7.4 G/DL (ref 6.4–8.2)
RBC # BLD AUTO: 4.6 MILLION/UL (ref 3.81–5.12)
SODIUM SERPL-SCNC: 140 MMOL/L (ref 136–145)
WBC # BLD AUTO: 6.54 THOUSAND/UL (ref 4.31–10.16)

## 2019-01-09 PROCEDURE — 80053 COMPREHEN METABOLIC PANEL: CPT

## 2019-01-09 PROCEDURE — 36415 COLL VENOUS BLD VENIPUNCTURE: CPT

## 2019-01-09 PROCEDURE — 85025 COMPLETE CBC W/AUTO DIFF WBC: CPT

## 2019-01-09 PROCEDURE — 80177 DRUG SCRN QUAN LEVETIRACETAM: CPT

## 2019-01-11 LAB — LEVETIRACETAM SERPL-MCNC: 19.8 UG/ML (ref 10–40)

## 2019-04-11 DIAGNOSIS — G40.802 OTHER EPILEPSY WITHOUT STATUS EPILEPTICUS, NOT INTRACTABLE (HCC): ICD-10-CM

## 2019-04-11 RX ORDER — LEVETIRACETAM 750 MG/1
750 TABLET ORAL 2 TIMES DAILY
Qty: 180 TABLET | Refills: 0 | Status: SHIPPED | OUTPATIENT
Start: 2019-04-11 | End: 2019-11-08 | Stop reason: SDUPTHER

## 2019-04-18 ENCOUNTER — TELEPHONE (OUTPATIENT)
Dept: NEUROLOGY | Facility: CLINIC | Age: 84
End: 2019-04-18

## 2019-05-01 ENCOUNTER — TELEPHONE (OUTPATIENT)
Dept: FAMILY MEDICINE CLINIC | Facility: CLINIC | Age: 84
End: 2019-05-01

## 2019-05-02 DIAGNOSIS — E78.5 HYPERLIPIDEMIA, UNSPECIFIED HYPERLIPIDEMIA TYPE: ICD-10-CM

## 2019-05-03 RX ORDER — ATORVASTATIN CALCIUM 10 MG/1
10 TABLET, FILM COATED ORAL DAILY
Qty: 120 TABLET | Refills: 0 | Status: SHIPPED | OUTPATIENT
Start: 2019-05-03 | End: 2020-02-28

## 2019-05-07 ENCOUNTER — APPOINTMENT (OUTPATIENT)
Dept: LAB | Facility: IMAGING CENTER | Age: 84
End: 2019-05-07
Payer: MEDICARE

## 2019-05-07 ENCOUNTER — TRANSCRIBE ORDERS (OUTPATIENT)
Dept: ADMINISTRATIVE | Facility: HOSPITAL | Age: 84
End: 2019-05-07

## 2019-05-07 DIAGNOSIS — E78.5 HYPERLIPIDEMIA, UNSPECIFIED HYPERLIPIDEMIA TYPE: ICD-10-CM

## 2019-05-07 DIAGNOSIS — I10 HYPERTENSION, UNSPECIFIED TYPE: ICD-10-CM

## 2019-05-07 DIAGNOSIS — E11.9 TYPE 2 DIABETES MELLITUS WITHOUT COMPLICATION, UNSPECIFIED WHETHER LONG TERM INSULIN USE (HCC): Primary | ICD-10-CM

## 2019-05-07 DIAGNOSIS — E11.9 TYPE 2 DIABETES MELLITUS WITHOUT COMPLICATION, UNSPECIFIED WHETHER LONG TERM INSULIN USE (HCC): ICD-10-CM

## 2019-05-07 LAB
ALBUMIN SERPL BCP-MCNC: 3.8 G/DL (ref 3.5–5)
ALP SERPL-CCNC: 60 U/L (ref 46–116)
ALT SERPL W P-5'-P-CCNC: 26 U/L (ref 12–78)
ANION GAP SERPL CALCULATED.3IONS-SCNC: 6 MMOL/L (ref 4–13)
AST SERPL W P-5'-P-CCNC: 18 U/L (ref 5–45)
BASOPHILS # BLD AUTO: 0.06 THOUSANDS/ΜL (ref 0–0.1)
BASOPHILS NFR BLD AUTO: 1 % (ref 0–1)
BILIRUB SERPL-MCNC: 0.45 MG/DL (ref 0.2–1)
BILIRUB UR QL STRIP: NEGATIVE
BUN SERPL-MCNC: 17 MG/DL (ref 5–25)
CALCIUM SERPL-MCNC: 8.8 MG/DL (ref 8.3–10.1)
CHLORIDE SERPL-SCNC: 107 MMOL/L (ref 100–108)
CHOLEST SERPL-MCNC: 126 MG/DL (ref 50–200)
CLARITY UR: CLEAR
CO2 SERPL-SCNC: 30 MMOL/L (ref 21–32)
COLOR UR: YELLOW
CREAT SERPL-MCNC: 0.74 MG/DL (ref 0.6–1.3)
EOSINOPHIL # BLD AUTO: 0.21 THOUSAND/ΜL (ref 0–0.61)
EOSINOPHIL NFR BLD AUTO: 3 % (ref 0–6)
ERYTHROCYTE [DISTWIDTH] IN BLOOD BY AUTOMATED COUNT: 13.1 % (ref 11.6–15.1)
EST. AVERAGE GLUCOSE BLD GHB EST-MCNC: 131 MG/DL
GFR SERPL CREATININE-BSD FRML MDRD: 75 ML/MIN/1.73SQ M
GLUCOSE P FAST SERPL-MCNC: 118 MG/DL (ref 65–99)
GLUCOSE UR STRIP-MCNC: NEGATIVE MG/DL
HBA1C MFR BLD: 6.2 % (ref 4.2–6.3)
HCT VFR BLD AUTO: 42.5 % (ref 34.8–46.1)
HDLC SERPL-MCNC: 52 MG/DL (ref 40–60)
HGB BLD-MCNC: 13.1 G/DL (ref 11.5–15.4)
HGB UR QL STRIP.AUTO: NEGATIVE
IMM GRANULOCYTES # BLD AUTO: 0.01 THOUSAND/UL (ref 0–0.2)
IMM GRANULOCYTES NFR BLD AUTO: 0 % (ref 0–2)
KETONES UR STRIP-MCNC: NEGATIVE MG/DL
LDLC SERPL CALC-MCNC: 46 MG/DL (ref 0–100)
LEUKOCYTE ESTERASE UR QL STRIP: NEGATIVE
LYMPHOCYTES # BLD AUTO: 1.51 THOUSANDS/ΜL (ref 0.6–4.47)
LYMPHOCYTES NFR BLD AUTO: 23 % (ref 14–44)
MCH RBC QN AUTO: 28.9 PG (ref 26.8–34.3)
MCHC RBC AUTO-ENTMCNC: 30.8 G/DL (ref 31.4–37.4)
MCV RBC AUTO: 94 FL (ref 82–98)
MONOCYTES # BLD AUTO: 0.51 THOUSAND/ΜL (ref 0.17–1.22)
MONOCYTES NFR BLD AUTO: 8 % (ref 4–12)
NEUTROPHILS # BLD AUTO: 4.29 THOUSANDS/ΜL (ref 1.85–7.62)
NEUTS SEG NFR BLD AUTO: 65 % (ref 43–75)
NITRITE UR QL STRIP: NEGATIVE
NONHDLC SERPL-MCNC: 74 MG/DL
NRBC BLD AUTO-RTO: 0 /100 WBCS
PH UR STRIP.AUTO: 6.5 [PH]
PLATELET # BLD AUTO: 240 THOUSANDS/UL (ref 149–390)
PMV BLD AUTO: 10.8 FL (ref 8.9–12.7)
POTASSIUM SERPL-SCNC: 4.7 MMOL/L (ref 3.5–5.3)
PROT SERPL-MCNC: 7.4 G/DL (ref 6.4–8.2)
PROT UR STRIP-MCNC: NEGATIVE MG/DL
RBC # BLD AUTO: 4.53 MILLION/UL (ref 3.81–5.12)
SODIUM SERPL-SCNC: 143 MMOL/L (ref 136–145)
SP GR UR STRIP.AUTO: 1.01 (ref 1–1.03)
TRIGL SERPL-MCNC: 142 MG/DL
TSH SERPL DL<=0.05 MIU/L-ACNC: 1.09 UIU/ML (ref 0.36–3.74)
UROBILINOGEN UR QL STRIP.AUTO: 0.2 E.U./DL
WBC # BLD AUTO: 6.59 THOUSAND/UL (ref 4.31–10.16)

## 2019-05-07 PROCEDURE — 85025 COMPLETE CBC W/AUTO DIFF WBC: CPT

## 2019-05-07 PROCEDURE — 36415 COLL VENOUS BLD VENIPUNCTURE: CPT

## 2019-05-07 PROCEDURE — 80061 LIPID PANEL: CPT

## 2019-05-07 PROCEDURE — 83036 HEMOGLOBIN GLYCOSYLATED A1C: CPT

## 2019-05-07 PROCEDURE — 81003 URINALYSIS AUTO W/O SCOPE: CPT

## 2019-05-07 PROCEDURE — 84443 ASSAY THYROID STIM HORMONE: CPT

## 2019-05-07 PROCEDURE — 80053 COMPREHEN METABOLIC PANEL: CPT

## 2019-05-08 ENCOUNTER — OFFICE VISIT (OUTPATIENT)
Dept: NEUROLOGY | Facility: CLINIC | Age: 84
End: 2019-05-08
Payer: MEDICARE

## 2019-05-08 VITALS
HEIGHT: 63 IN | BODY MASS INDEX: 28.74 KG/M2 | RESPIRATION RATE: 16 BRPM | HEART RATE: 83 BPM | SYSTOLIC BLOOD PRESSURE: 118 MMHG | DIASTOLIC BLOOD PRESSURE: 68 MMHG | WEIGHT: 162.2 LBS

## 2019-05-08 DIAGNOSIS — G40.209 PARTIAL SYMPTOMATIC EPILEPSY WITH COMPLEX PARTIAL SEIZURES, NOT INTRACTABLE, WITHOUT STATUS EPILEPTICUS (HCC): Primary | ICD-10-CM

## 2019-05-08 PROCEDURE — 99213 OFFICE O/P EST LOW 20 MIN: CPT | Performed by: PSYCHIATRY & NEUROLOGY

## 2019-05-08 RX ORDER — VALSARTAN 80 MG/1
80 TABLET ORAL DAILY
COMMUNITY
End: 2019-05-09

## 2019-05-08 RX ORDER — HYDROCHLOROTHIAZIDE 25 MG/1
TABLET ORAL
COMMUNITY
Start: 2018-06-20 | End: 2019-05-09

## 2019-05-09 ENCOUNTER — OFFICE VISIT (OUTPATIENT)
Dept: FAMILY MEDICINE CLINIC | Facility: CLINIC | Age: 84
End: 2019-05-09
Payer: MEDICARE

## 2019-05-09 VITALS
DIASTOLIC BLOOD PRESSURE: 74 MMHG | HEART RATE: 94 BPM | BODY MASS INDEX: 28.53 KG/M2 | OXYGEN SATURATION: 97 % | SYSTOLIC BLOOD PRESSURE: 136 MMHG | HEIGHT: 63 IN | WEIGHT: 161 LBS | RESPIRATION RATE: 16 BRPM | TEMPERATURE: 98.2 F

## 2019-05-09 DIAGNOSIS — G40.209 PARTIAL SYMPTOMATIC EPILEPSY WITH COMPLEX PARTIAL SEIZURES, NOT INTRACTABLE, WITHOUT STATUS EPILEPTICUS (HCC): ICD-10-CM

## 2019-05-09 DIAGNOSIS — E11.9 DIABETES MELLITUS WITHOUT COMPLICATION (HCC): Primary | ICD-10-CM

## 2019-05-09 DIAGNOSIS — E78.49 OTHER HYPERLIPIDEMIA: ICD-10-CM

## 2019-05-09 DIAGNOSIS — I10 ESSENTIAL HYPERTENSION: ICD-10-CM

## 2019-05-09 DIAGNOSIS — E55.9 VITAMIN D INSUFFICIENCY: ICD-10-CM

## 2019-05-09 DIAGNOSIS — Z00.00 HEALTHCARE MAINTENANCE: ICD-10-CM

## 2019-05-09 PROCEDURE — G0439 PPPS, SUBSEQ VISIT: HCPCS | Performed by: FAMILY MEDICINE

## 2019-05-09 PROCEDURE — 99214 OFFICE O/P EST MOD 30 MIN: CPT | Performed by: FAMILY MEDICINE

## 2019-05-31 ENCOUNTER — TELEPHONE (OUTPATIENT)
Dept: FAMILY MEDICINE CLINIC | Facility: CLINIC | Age: 84
End: 2019-05-31

## 2019-06-03 DIAGNOSIS — I10 HYPERTENSION, UNSPECIFIED TYPE: ICD-10-CM

## 2019-06-03 RX ORDER — IRBESARTAN 75 MG/1
TABLET ORAL
Qty: 120 TABLET | Refills: 0 | Status: SHIPPED | OUTPATIENT
Start: 2019-06-03 | End: 2019-10-03 | Stop reason: SDUPTHER

## 2019-06-10 LAB
LEFT EYE DIABETIC RETINOPATHY: NORMAL
RIGHT EYE DIABETIC RETINOPATHY: NORMAL

## 2019-09-11 ENCOUNTER — TELEPHONE (OUTPATIENT)
Dept: FAMILY MEDICINE CLINIC | Facility: CLINIC | Age: 84
End: 2019-09-11

## 2019-09-11 NOTE — TELEPHONE ENCOUNTER
Pt went to opthalmologic and will be having eye surgery and will need a clearance by pcp prior   I lm for her to schedule appointment

## 2019-09-12 ENCOUNTER — TELEPHONE (OUTPATIENT)
Dept: FAMILY MEDICINE CLINIC | Facility: CLINIC | Age: 84
End: 2019-09-12

## 2019-09-12 NOTE — TELEPHONE ENCOUNTER
Pt called states she may be having cataract surgery in Oct and wanted to know if she needs labs prior  Informed pt , no unless the surgeon orders labs  Informed pt she is due to have her 6 mo labs prior to her 11/7 /19 appt

## 2019-10-03 DIAGNOSIS — I10 HYPERTENSION, UNSPECIFIED TYPE: ICD-10-CM

## 2019-10-03 RX ORDER — IRBESARTAN 75 MG/1
TABLET ORAL
Qty: 120 TABLET | Refills: 0 | Status: SHIPPED | OUTPATIENT
Start: 2019-10-03 | End: 2020-02-02

## 2019-10-07 ENCOUNTER — APPOINTMENT (OUTPATIENT)
Dept: LAB | Facility: IMAGING CENTER | Age: 84
End: 2019-10-07
Payer: MEDICARE

## 2019-10-07 ENCOUNTER — TRANSCRIBE ORDERS (OUTPATIENT)
Dept: ADMINISTRATIVE | Facility: HOSPITAL | Age: 84
End: 2019-10-07

## 2019-10-07 DIAGNOSIS — E11.9 DIABETES MELLITUS WITHOUT COMPLICATION (HCC): ICD-10-CM

## 2019-10-07 DIAGNOSIS — E55.9 VITAMIN D INSUFFICIENCY: ICD-10-CM

## 2019-10-07 LAB
25(OH)D3 SERPL-MCNC: 25.7 NG/ML (ref 30–100)
ALBUMIN SERPL BCP-MCNC: 3.6 G/DL (ref 3.5–5)
ALP SERPL-CCNC: 61 U/L (ref 46–116)
ALT SERPL W P-5'-P-CCNC: 26 U/L (ref 12–78)
ANION GAP SERPL CALCULATED.3IONS-SCNC: 4 MMOL/L (ref 4–13)
AST SERPL W P-5'-P-CCNC: 22 U/L (ref 5–45)
BASOPHILS # BLD AUTO: 0.05 THOUSANDS/ΜL (ref 0–0.1)
BASOPHILS NFR BLD AUTO: 1 % (ref 0–1)
BILIRUB SERPL-MCNC: 0.37 MG/DL (ref 0.2–1)
BUN SERPL-MCNC: 15 MG/DL (ref 5–25)
CALCIUM SERPL-MCNC: 9.1 MG/DL (ref 8.3–10.1)
CHLORIDE SERPL-SCNC: 108 MMOL/L (ref 100–108)
CHOLEST SERPL-MCNC: 118 MG/DL (ref 50–200)
CO2 SERPL-SCNC: 28 MMOL/L (ref 21–32)
CREAT SERPL-MCNC: 0.76 MG/DL (ref 0.6–1.3)
CREAT UR-MCNC: 104 MG/DL
EOSINOPHIL # BLD AUTO: 0.21 THOUSAND/ΜL (ref 0–0.61)
EOSINOPHIL NFR BLD AUTO: 3 % (ref 0–6)
ERYTHROCYTE [DISTWIDTH] IN BLOOD BY AUTOMATED COUNT: 12.9 % (ref 11.6–15.1)
EST. AVERAGE GLUCOSE BLD GHB EST-MCNC: 137 MG/DL
GFR SERPL CREATININE-BSD FRML MDRD: 73 ML/MIN/1.73SQ M
GLUCOSE P FAST SERPL-MCNC: 121 MG/DL (ref 65–99)
HBA1C MFR BLD: 6.4 % (ref 4.2–6.3)
HCT VFR BLD AUTO: 41.8 % (ref 34.8–46.1)
HDLC SERPL-MCNC: 44 MG/DL (ref 40–60)
HGB BLD-MCNC: 13 G/DL (ref 11.5–15.4)
IMM GRANULOCYTES # BLD AUTO: 0.02 THOUSAND/UL (ref 0–0.2)
IMM GRANULOCYTES NFR BLD AUTO: 0 % (ref 0–2)
LDLC SERPL CALC-MCNC: 47 MG/DL (ref 0–100)
LYMPHOCYTES # BLD AUTO: 1.31 THOUSANDS/ΜL (ref 0.6–4.47)
LYMPHOCYTES NFR BLD AUTO: 20 % (ref 14–44)
MCH RBC QN AUTO: 28.9 PG (ref 26.8–34.3)
MCHC RBC AUTO-ENTMCNC: 31.1 G/DL (ref 31.4–37.4)
MCV RBC AUTO: 93 FL (ref 82–98)
MICROALBUMIN UR-MCNC: 13.2 MG/L (ref 0–20)
MICROALBUMIN/CREAT 24H UR: 13 MG/G CREATININE (ref 0–30)
MONOCYTES # BLD AUTO: 0.49 THOUSAND/ΜL (ref 0.17–1.22)
MONOCYTES NFR BLD AUTO: 8 % (ref 4–12)
NEUTROPHILS # BLD AUTO: 4.33 THOUSANDS/ΜL (ref 1.85–7.62)
NEUTS SEG NFR BLD AUTO: 68 % (ref 43–75)
NRBC BLD AUTO-RTO: 0 /100 WBCS
PLATELET # BLD AUTO: 241 THOUSANDS/UL (ref 149–390)
PMV BLD AUTO: 10.6 FL (ref 8.9–12.7)
POTASSIUM SERPL-SCNC: 4.4 MMOL/L (ref 3.5–5.3)
PROT SERPL-MCNC: 7.3 G/DL (ref 6.4–8.2)
RBC # BLD AUTO: 4.5 MILLION/UL (ref 3.81–5.12)
SODIUM SERPL-SCNC: 140 MMOL/L (ref 136–145)
TRIGL SERPL-MCNC: 136 MG/DL
TSH SERPL DL<=0.05 MIU/L-ACNC: 1.13 UIU/ML (ref 0.36–3.74)
WBC # BLD AUTO: 6.41 THOUSAND/UL (ref 4.31–10.16)

## 2019-10-07 PROCEDURE — 80053 COMPREHEN METABOLIC PANEL: CPT

## 2019-10-07 PROCEDURE — 83036 HEMOGLOBIN GLYCOSYLATED A1C: CPT

## 2019-10-07 PROCEDURE — 85025 COMPLETE CBC W/AUTO DIFF WBC: CPT

## 2019-10-07 PROCEDURE — 82043 UR ALBUMIN QUANTITATIVE: CPT | Performed by: FAMILY MEDICINE

## 2019-10-07 PROCEDURE — 36415 COLL VENOUS BLD VENIPUNCTURE: CPT

## 2019-10-07 PROCEDURE — 82306 VITAMIN D 25 HYDROXY: CPT

## 2019-10-07 PROCEDURE — 80061 LIPID PANEL: CPT

## 2019-10-07 PROCEDURE — 84443 ASSAY THYROID STIM HORMONE: CPT

## 2019-10-07 PROCEDURE — 82570 ASSAY OF URINE CREATININE: CPT | Performed by: FAMILY MEDICINE

## 2019-10-09 RX ORDER — CYCLOSPORINE 0.5 MG/ML
EMULSION OPHTHALMIC
Refills: 6 | COMMUNITY
Start: 2019-08-14 | End: 2020-12-15 | Stop reason: ALTCHOICE

## 2019-10-14 ENCOUNTER — CONSULT (OUTPATIENT)
Dept: FAMILY MEDICINE CLINIC | Facility: CLINIC | Age: 84
End: 2019-10-14
Payer: MEDICARE

## 2019-10-14 ENCOUNTER — TELEPHONE (OUTPATIENT)
Dept: FAMILY MEDICINE CLINIC | Facility: CLINIC | Age: 84
End: 2019-10-14

## 2019-10-14 VITALS
TEMPERATURE: 98.1 F | DIASTOLIC BLOOD PRESSURE: 82 MMHG | SYSTOLIC BLOOD PRESSURE: 134 MMHG | RESPIRATION RATE: 16 BRPM | WEIGHT: 164.38 LBS | HEIGHT: 62 IN | OXYGEN SATURATION: 96 % | BODY MASS INDEX: 30.25 KG/M2 | HEART RATE: 91 BPM

## 2019-10-14 DIAGNOSIS — E78.49 OTHER HYPERLIPIDEMIA: ICD-10-CM

## 2019-10-14 DIAGNOSIS — Z01.818 PRE-OP EXAMINATION: ICD-10-CM

## 2019-10-14 DIAGNOSIS — I10 ESSENTIAL HYPERTENSION: ICD-10-CM

## 2019-10-14 DIAGNOSIS — L30.9 DERMATITIS: ICD-10-CM

## 2019-10-14 DIAGNOSIS — H25.9 AGE-RELATED CATARACT OF BOTH EYES, UNSPECIFIED AGE-RELATED CATARACT TYPE: Primary | ICD-10-CM

## 2019-10-14 DIAGNOSIS — E11.9 DIABETES MELLITUS WITHOUT COMPLICATION (HCC): ICD-10-CM

## 2019-10-14 PROCEDURE — 99214 OFFICE O/P EST MOD 30 MIN: CPT | Performed by: FAMILY MEDICINE

## 2019-10-14 RX ORDER — TRIAMCINOLONE ACETONIDE 5 MG/G
CREAM TOPICAL 3 TIMES DAILY
Qty: 30 G | Refills: 1 | Status: SHIPPED | OUTPATIENT
Start: 2019-10-14 | End: 2020-12-15 | Stop reason: ALTCHOICE

## 2019-10-14 NOTE — PROGRESS NOTES
Assessment/Plan:    Pre-op examination  She is an acceptable risk for the proposed procedure  Diabetes mellitus without complication ([address] )  Well controlled  Continue same  Will continue to monitor  Lab Results   Component Value Date    HGBA1C 6 4 (H) 10/07/2019       Age-related cataract of both eyes  She is getting cataract surgery next week for her left eye and then in 3 weeks for her right eye  Diagnoses and all orders for this visit:    Age-related cataract of both eyes, unspecified age-related cataract type    Pre-op examination    Essential hypertension    Other hyperlipidemia    Diabetes mellitus without complication (HCC)    Dermatitis  -     triamcinolone (KENALOG) 0 5 % cream; Apply topically 3 (three) times a day    Other orders  -     RESTASIS 0 05 % ophthalmic emulsion; INT 1 GTT IN OU Q 12 H          Subjective:      Patient ID: Marifer Cardona is a 81 y o  female  S P  Is an 81year old female here today for a pre-op clearance for a left cataract surgery she is having on 10/22  Patient stated 2 weeks after that she will be having the right one done  Patient stated she ambulates at home okay and her  will help care for her after the surgery  Patient stated she feels overall well  The following portions of the patient's history were reviewed and updated as appropriate: allergies, current medications, past family history, past medical history, past social history, past surgical history and problem list     Review of Systems   Constitutional: Negative for chills and fever  HENT: Negative for congestion, ear pain, sinus pain and sore throat  Respiratory: Negative for cough and shortness of breath  Cardiovascular: Negative for chest pain and palpitations  Gastrointestinal: Negative for constipation, diarrhea, nausea and vomiting  Genitourinary: Negative for dysuria and frequency  Musculoskeletal: Negative for myalgias  Skin: Positive for rash  Erythematous macular rash to left hip area  Neurological: Negative for dizziness, light-headedness and headaches  Objective:      /82 (BP Location: Left arm, Patient Position: Sitting, Cuff Size: Adult)   Pulse 91   Temp 98 1 °F (36 7 °C) (Tympanic)   Resp 16   Ht 5' 2" (1 575 m)   Wt 74 6 kg (164 lb 6 oz)   SpO2 96%   BMI 30 06 kg/m²          Physical Exam   Constitutional: She is oriented to person, place, and time  She appears well-developed and well-nourished  No distress  HENT:   Head: Normocephalic and atraumatic  Right Ear: External ear normal    Left Ear: External ear normal    Nose: Nose normal    Mouth/Throat: Oropharynx is clear and moist    Eyes: Pupils are equal, round, and reactive to light  Conjunctivae and EOM are normal    Neck: Normal range of motion  Neck supple  Cardiovascular: Normal rate, regular rhythm and normal heart sounds  No murmur heard  Pulmonary/Chest: Effort normal and breath sounds normal    Abdominal: Soft  Bowel sounds are normal  She exhibits no distension  There is no tenderness  Musculoskeletal: Normal range of motion  She exhibits no edema  Neurological: She is alert and oriented to person, place, and time  Skin: Skin is warm and dry  Rash noted  Two erythematous macular patches on left hip  Nontender  Nursing note and vitals reviewed

## 2019-10-14 NOTE — TELEPHONE ENCOUNTER
----- Message from Aleta Robertson MD sent at 10/13/2019  8:57 AM EDT -----  A1C is well controlled at 6 4  Her Vit D is low  I recommend Vit D 2000 units daily   All the rest of BW came back normal

## 2019-10-14 NOTE — TELEPHONE ENCOUNTER
Pt was here today 10/14/19 for office visit, I called pt and she stated Dr Zaki Chaparro went over results with her

## 2019-10-14 NOTE — ASSESSMENT & PLAN NOTE
Well controlled  Continue same  Will continue to monitor    Lab Results   Component Value Date    HGBA1C 6 4 (H) 10/07/2019

## 2019-11-07 ENCOUNTER — OFFICE VISIT (OUTPATIENT)
Dept: NEUROLOGY | Facility: CLINIC | Age: 84
End: 2019-11-07
Payer: MEDICARE

## 2019-11-07 VITALS
WEIGHT: 164.2 LBS | DIASTOLIC BLOOD PRESSURE: 68 MMHG | HEART RATE: 79 BPM | RESPIRATION RATE: 16 BRPM | SYSTOLIC BLOOD PRESSURE: 132 MMHG | BODY MASS INDEX: 29.09 KG/M2 | HEIGHT: 63 IN

## 2019-11-07 DIAGNOSIS — G40.209 PARTIAL SYMPTOMATIC EPILEPSY WITH COMPLEX PARTIAL SEIZURES, NOT INTRACTABLE, WITHOUT STATUS EPILEPTICUS (HCC): Primary | ICD-10-CM

## 2019-11-07 PROCEDURE — 99213 OFFICE O/P EST LOW 20 MIN: CPT | Performed by: PSYCHIATRY & NEUROLOGY

## 2019-11-07 RX ORDER — NEPAFENAC 0.3 %
SUSPENSION, DROPS(FINAL DOSAGE FORM)(ML) OPHTHALMIC (EYE) DAILY
COMMUNITY
Start: 2019-10-31 | End: 2021-11-29

## 2019-11-07 NOTE — PROGRESS NOTES
Patient ID: Bairon Santos is a 81 y o  female  Assessment/Plan:    Partial symptomatic epilepsy with complex partial seizures, not intractable, without status epilepticus ([facility] )  Continues to do well  No seizure or seizure-like symptoms reported since her last appointment  Has remained on levetiracetam 750 milligrams twice daily  Last event October 14, 2018  Expresses no symptoms that would suggest any adverse side effects from the levetiracetam   Continue levetiracetam 750 milligram tablets taking 1 tablet twice daily  She will follow up in 6 months or as needed  Subjective:    HPI  Patient, 81years of age, continues her follow-up given her seizure disorder, characterized as partial in onset  Since last seen she has remained on her levetiracetam 750 milligrams twice daily  Reports no seizure or seizure-like symptoms  Her last event occurred on October 14, 2018 and on that occasion her levetiracetam schedule was advanced to her current schedule  Reports no symptoms to suggest any adverse side effects from levetiracetam     Blood work performed last month reviewed  CBC with hemoglobin 13 0, hematocrit 41 8, white count 6 41 and platelet count 109  CMP with creatinine 0 76, AST 22 and ALT 26  TSH 1 130      Past Medical History:   Diagnosis Date    Diabetes mellitus ([facility] )     Dyslipidemia     Hemochromatosis     Hypertension      Past Surgical History:   Procedure Laterality Date    KNEE SURGERY Bilateral      Social History     Socioeconomic History    Marital status: /Civil Union     Spouse name: None    Number of children: None    Years of education: None    Highest education level: None   Occupational History    None   Social Needs    Financial resource strain: None    Food insecurity:     Worry: None     Inability: None    Transportation needs:     Medical: None     Non-medical: None   Tobacco Use    Smoking status: Never Smoker    Smokeless tobacco: Never Used Substance and Sexual Activity    Alcohol use: Yes    Drug use: No    Sexual activity: None   Lifestyle    Physical activity:     Days per week: None     Minutes per session: None    Stress: None   Relationships    Social connections:     Talks on phone: None     Gets together: None     Attends [employer] service: None     Active member of club or organization: None     Attends meetings of clubs or organizations: None     Relationship status: None    Intimate partner violence:     Fear of current or ex partner: None     Emotionally abused: None     Physically abused: None     Forced sexual activity: None   Other Topics Concern    None   Social History Narrative    None     Family History   Problem Relation Age of Onset    No Known Problems Family      Allergies   Allergen Reactions    No Active Allergies        Current Outpatient Medications:     aspirin (ECOTRIN LOW STRENGTH) 81 mg EC tablet, Take 1 tablet (81 mg total) by mouth daily, Disp: , Rfl: 0    atorvastatin (LIPITOR) 10 mg tablet, Take 1 tablet (10 mg total) by mouth daily, Disp: 120 tablet, Rfl: 0    glucose blood test strip, Pt to test blood sugars twice daily, Disp: 100 each, Rfl: 3    ILEVRO 0 3 % SUSP, daily , Disp: , Rfl:     irbesartan (AVAPRO) 75 mg tablet, TAKE 1 TABLET BY MOUTH DAILY, Disp: 120 tablet, Rfl: 0    Lancet Devices (ACCU-CHEK SOFTCLIX) lancets, Use as instructed, Disp: 100 each, Rfl: 3    LANCETS SUPER THIN 28G MISC, use 1 lancet by Misc  (Non-Drug; Combo Route) route once TEST EVERY DAY, Disp: , Rfl:     levETIRAcetam (KEPPRA) 750 mg tablet, Take 1 tablet (750 mg total) by mouth 2 (two) times a day, Disp: 180 tablet, Rfl: 0    RESTASIS 0 05 % ophthalmic emulsion, INT 1 GTT IN OU Q 12 H, Disp: , Rfl: 6    sitaGLIPtin (JANUVIA) 100 mg tablet, Take 1 tablet (100 mg total) by mouth daily, Disp: 90 tablet, Rfl: 1    triamcinolone (KENALOG) 0 5 % cream, Apply topically 3 (three) times a day, Disp: 30 g, Rfl: 1    Objective:    Blood pressure 132/68, pulse 79, resp  rate 16, height 5' 2 5" (1 588 m), weight 74 5 kg (164 lb 3 2 oz)  Physical Exam  Head normocephalic  Eyes nonicteric  No audible anterior neck bruits  Lungs clear to auscultation  Rhythm regular  GI (abdomen) soft nontender  Bowel sounds present  No significant lower extremity edema  Neurological Exam  Alert  Fully oriented  Pleasantly interactive and appropriately conversational   Gait independent  Cranial nerves 2-12 tested and grossly intact, except for now evidence of her bilateral cataract surgeries  Accurate finger-to-nose bilaterally  Full symmetrical strength throughout the 4 extremities  No upper extremity drift  Muscle stretch reflexes bilaterally 2 throughout the upper extremities bilaterally 1+ at the knees and bilaterally trace at the ankles  Toe response downgoing bilaterally  ROS:    Review of Systems   Constitutional: Negative  Negative for appetite change and fever  HENT: Positive for tinnitus  Negative for hearing loss, trouble swallowing and voice change  Eyes: Negative  Negative for photophobia and pain  Respiratory: Negative  Negative for shortness of breath  Cardiovascular: Negative  Negative for palpitations  Gastrointestinal: Negative  Negative for nausea and vomiting  Endocrine: Negative  Negative for cold intolerance and heat intolerance  Genitourinary: Negative  Negative for dysuria, frequency and urgency  Musculoskeletal: Negative  Negative for myalgias and neck pain  Skin: Negative  Negative for rash  Allergic/Immunologic: Negative  Neurological: Negative  Negative for dizziness, tremors, seizures, syncope, facial asymmetry, speech difficulty, weakness, light-headedness, numbness and headaches  Hematological: Negative  Does not bruise/bleed easily  Psychiatric/Behavioral: Negative  Negative for confusion, hallucinations and sleep disturbance         I personally reviewed the ROS that was entered by the medical assistant  *Please note this document was created using voice recognition software and may contain sound-alike word errors  *

## 2019-11-07 NOTE — ASSESSMENT & PLAN NOTE
Continues to do well  No seizure or seizure-like symptoms reported since her last appointment  Has remained on levetiracetam 750 milligrams twice daily  Last event October 14, 2018  Expresses no symptoms that would suggest any adverse side effects from the levetiracetam   Continue levetiracetam 750 milligram tablets taking 1 tablet twice daily

## 2019-11-07 NOTE — LETTER
November 7, 2019     Dexter Wilson MD  [address] [place] [address]    Patient: Honey Black   YOB: 1935   Date of Visit: 11/7/2019       Dear Dr Elida Jones: Thank you for referring Kwesi Garcia to me for evaluation  Below are my notes for this consultation  If you have questions, please do not hesitate to call me  I look forward to following your patient along with you  Sincerely,        Marino Sadler MD        CC: No Recipients  Marino Sadler MD  11/7/2019 10:58 AM  Sign at close encounter  Patient ID: Honey Black is a 81 y o  female  Assessment/Plan:    Partial symptomatic epilepsy with complex partial seizures, not intractable, without status epilepticus ([facility] )  Continues to do well  No seizure or seizure-like symptoms reported since her last appointment  Has remained on levetiracetam 750 milligrams twice daily  Last event October 14, 2018  Expresses no symptoms that would suggest any adverse side effects from the levetiracetam   Continue levetiracetam 750 milligram tablets taking 1 tablet twice daily  She will follow up in 6 months or as needed  Subjective:    HPI  Patient, 81years of age, continues her follow-up given her seizure disorder, characterized as partial in onset  Since last seen she has remained on her levetiracetam 750 milligrams twice daily  Reports no seizure or seizure-like symptoms  Her last event occurred on October 14, 2018 and on that occasion her levetiracetam schedule was advanced to her current schedule  Reports no symptoms to suggest any adverse side effects from levetiracetam     Blood work performed last month reviewed  CBC with hemoglobin 13 0, hematocrit 41 8, white count 6 41 and platelet count 094  CMP with creatinine 0 76, AST 22 and ALT 26  TSH 1 130      Past Medical History:   Diagnosis Date    Diabetes mellitus ([facility] )     Dyslipidemia     Hemochromatosis     Hypertension      Past Surgical History:   Procedure Laterality Date    KNEE SURGERY Bilateral      Social History     Socioeconomic History    Marital status: /Civil Union     Spouse name: None    Number of children: None    Years of education: None    Highest education level: None   Occupational History    None   Social Needs    Financial resource strain: None    Food insecurity:     Worry: None     Inability: None    Transportation needs:     Medical: None     Non-medical: None   Tobacco Use    Smoking status: Never Smoker    Smokeless tobacco: Never Used   Substance and Sexual Activity    Alcohol use:  Yes    Drug use: No    Sexual activity: None   Lifestyle    Physical activity:     Days per week: None     Minutes per session: None    Stress: None   Relationships    Social connections:     Talks on phone: None     Gets together: None     Attends [employer] service: None     Active member of club or organization: None     Attends meetings of clubs or organizations: None     Relationship status: None    Intimate partner violence:     Fear of current or ex partner: None     Emotionally abused: None     Physically abused: None     Forced sexual activity: None   Other Topics Concern    None   Social History Narrative    None     Family History   Problem Relation Age of Onset    No Known Problems Family      Allergies   Allergen Reactions    No Active Allergies        Current Outpatient Medications:     aspirin (ECOTRIN LOW STRENGTH) 81 mg EC tablet, Take 1 tablet (81 mg total) by mouth daily, Disp: , Rfl: 0    atorvastatin (LIPITOR) 10 mg tablet, Take 1 tablet (10 mg total) by mouth daily, Disp: 120 tablet, Rfl: 0    glucose blood test strip, Pt to test blood sugars twice daily, Disp: 100 each, Rfl: 3    ILEVRO 0 3 % SUSP, daily , Disp: , Rfl:     irbesartan (AVAPRO) 75 mg tablet, TAKE 1 TABLET BY MOUTH DAILY, Disp: 120 tablet, Rfl: 0    Lancet Devices (ACCU-CHEK SOFTCLIX) lancets, Use as instructed, Disp: 100 each, Rfl: 3    LANCETS SUPER THIN 28G MISC, use 1 lancet by Misc  (Non-Drug; Combo Route) route once TEST EVERY DAY, Disp: , Rfl:     levETIRAcetam (KEPPRA) 750 mg tablet, Take 1 tablet (750 mg total) by mouth 2 (two) times a day, Disp: 180 tablet, Rfl: 0    RESTASIS 0 05 % ophthalmic emulsion, INT 1 GTT IN OU Q 12 H, Disp: , Rfl: 6    sitaGLIPtin (JANUVIA) 100 mg tablet, Take 1 tablet (100 mg total) by mouth daily, Disp: 90 tablet, Rfl: 1    triamcinolone (KENALOG) 0 5 % cream, Apply topically 3 (three) times a day, Disp: 30 g, Rfl: 1    Objective:    Blood pressure 132/68, pulse 79, resp  rate 16, height 5' 2 5" (1 588 m), weight 74 5 kg (164 lb 3 2 oz)  Physical Exam  Head normocephalic  Eyes nonicteric  No audible anterior neck bruits  Lungs clear to auscultation  Rhythm regular  GI (abdomen) soft nontender  Bowel sounds present  No significant lower extremity edema  Neurological Exam  Alert  Fully oriented  Pleasantly interactive and appropriately conversational   Gait independent  Cranial nerves 2-12 tested and grossly intact, except for now evidence of her bilateral cataract surgeries  Accurate finger-to-nose bilaterally  Full symmetrical strength throughout the 4 extremities  No upper extremity drift  Muscle stretch reflexes bilaterally 2 throughout the upper extremities bilaterally 1+ at the knees and bilaterally trace at the ankles  Toe response downgoing bilaterally  ROS:    Review of Systems   Constitutional: Negative  Negative for appetite change and fever  HENT: Positive for tinnitus  Negative for hearing loss, trouble swallowing and voice change  Eyes: Negative  Negative for photophobia and pain  Respiratory: Negative  Negative for shortness of breath  Cardiovascular: Negative  Negative for palpitations  Gastrointestinal: Negative  Negative for nausea and vomiting  Endocrine: Negative    Negative for cold intolerance and heat intolerance  Genitourinary: Negative  Negative for dysuria, frequency and urgency  Musculoskeletal: Negative  Negative for myalgias and neck pain  Skin: Negative  Negative for rash  Allergic/Immunologic: Negative  Neurological: Negative  Negative for dizziness, tremors, seizures, syncope, facial asymmetry, speech difficulty, weakness, light-headedness, numbness and headaches  Hematological: Negative  Does not bruise/bleed easily  Psychiatric/Behavioral: Negative  Negative for confusion, hallucinations and sleep disturbance  I personally reviewed the ROS that was entered by the medical assistant  *Please note this document was created using voice recognition software and may contain sound-alike word errors  *

## 2019-11-08 DIAGNOSIS — G40.802 OTHER EPILEPSY WITHOUT STATUS EPILEPTICUS, NOT INTRACTABLE (HCC): ICD-10-CM

## 2019-11-08 RX ORDER — LEVETIRACETAM 750 MG/1
750 TABLET ORAL 2 TIMES DAILY
Qty: 180 TABLET | Refills: 3 | Status: SHIPPED | OUTPATIENT
Start: 2019-11-08 | End: 2020-11-30 | Stop reason: SDUPTHER

## 2019-11-08 NOTE — TELEPHONE ENCOUNTER
Pt called requesting keppra refill  Rx entered  Pls sign off  Pt is requesting a call back once sent  fyi per pt  Pt states that [address] ordered Dr Shannon Antonio [facility] [address] [facility]) from [facility] [address]   [address] [facility] [address]    thanks        [phone] ok to leave detailed message

## 2019-11-11 ENCOUNTER — OFFICE VISIT (OUTPATIENT)
Dept: FAMILY MEDICINE CLINIC | Facility: CLINIC | Age: 84
End: 2019-11-11
Payer: MEDICARE

## 2019-11-11 VITALS
BODY MASS INDEX: 29.22 KG/M2 | WEIGHT: 164.9 LBS | HEIGHT: 63 IN | OXYGEN SATURATION: 97 % | DIASTOLIC BLOOD PRESSURE: 80 MMHG | HEART RATE: 80 BPM | RESPIRATION RATE: 16 BRPM | SYSTOLIC BLOOD PRESSURE: 139 MMHG | TEMPERATURE: 97.9 F

## 2019-11-11 DIAGNOSIS — I10 ESSENTIAL HYPERTENSION: ICD-10-CM

## 2019-11-11 DIAGNOSIS — E11.9 DIABETES MELLITUS WITHOUT COMPLICATION (HCC): Primary | ICD-10-CM

## 2019-11-11 DIAGNOSIS — G40.209 PARTIAL SYMPTOMATIC EPILEPSY WITH COMPLEX PARTIAL SEIZURES, NOT INTRACTABLE, WITHOUT STATUS EPILEPTICUS (HCC): ICD-10-CM

## 2019-11-11 DIAGNOSIS — E78.49 OTHER HYPERLIPIDEMIA: ICD-10-CM

## 2019-11-11 DIAGNOSIS — E55.9 VITAMIN D INSUFFICIENCY: ICD-10-CM

## 2019-11-11 PROCEDURE — 99214 OFFICE O/P EST MOD 30 MIN: CPT | Performed by: FAMILY MEDICINE

## 2019-11-11 NOTE — PROGRESS NOTES
Falls Plan of Care: balance, strength, and gait training instructions were provided  Assessment/Plan:  Diabetes mellitus without complication ([place] [address] )    Lab Results   Component Value Date    HGBA1C 6 4 (H) 10/07/2019    Well controlled  Continue same  Will continue to monitor  It was discussed about low carb diet  Essential hypertension  Well controlled  Continue same  It was discussed about monitoring blood pressure at home  Partial symptomatic epilepsy with complex partial seizures, not intractable, without status epilepticus ([facility])  Stable  Continue same  Continue to follow up with neurologist     Other hyperlipidemia  Stable  Continue same  Will continue to monitor  Diagnoses and all orders for this visit:    Diabetes mellitus without complication ([place] [address] )  -     CBC and differential; Future  -     Comprehensive metabolic panel; Future  -     Hemoglobin A1C; Future  -     Lipid Panel with Direct LDL reflex; Future  -     Microalbumin / creatinine urine ratio  -     TSH, 3rd generation with Free T4 reflex; Future    Vitamin D insufficiency  -     Vitamin D 25 hydroxy; Future    Essential hypertension    Partial symptomatic epilepsy with complex partial seizures, not intractable, without status epilepticus ([place] [address] )    Other hyperlipidemia          There are no Patient Instructions on file for this visit  Return in about 6 months (around 5/11/2020)  Subjective:      Patient ID: Hollis Hines is a 81 y o  female      Chief Complaint   Patient presents with    Hypertension    Hyperlipidemia    Diabetes       HPI    The following portions of the patient's history were reviewed and updated as appropriate: allergies, current medications, past family history, past medical history, past social history, past surgical history and problem list     Review of Systems      Current Outpatient Medications   Medication Sig Dispense Refill    aspirin (ECOTRIN LOW STRENGTH) 81 mg EC tablet Take 1 tablet (81 mg total) by mouth daily  0    atorvastatin (LIPITOR) 10 mg tablet Take 1 tablet (10 mg total) by mouth daily 120 tablet 0    glucose blood test strip Pt to test blood sugars twice daily 100 each 3    ILEVRO 0 3 % SUSP daily       irbesartan (AVAPRO) 75 mg tablet TAKE 1 TABLET BY MOUTH DAILY 120 tablet 0    Lancet Devices (ACCU-CHEK SOFTCLIX) lancets Use as instructed 100 each 3    LANCETS SUPER THIN 28G MISC use 1 lancet by Misc  (Non-Drug; Combo Route) route once TEST EVERY DAY      levETIRAcetam (KEPPRA) 750 mg tablet Take 1 tablet (750 mg total) by mouth 2 (two) times a day 180 tablet 3    RESTASIS 0 05 % ophthalmic emulsion INT 1 GTT IN OU Q 12 H  6    sitaGLIPtin (JANUVIA) 100 mg tablet Take 1 tablet (100 mg total) by mouth daily 90 tablet 1    triamcinolone (KENALOG) 0 5 % cream Apply topically 3 (three) times a day 30 g 1     No current facility-administered medications for this visit          Objective:    /80 (BP Location: Left arm, Patient Position: Sitting, Cuff Size: Standard)   Pulse 80   Temp 97 9 °F (36 6 °C) (Tympanic)   Resp 16   Ht 5' 2 5" (1 588 m)   Wt 74 8 kg (164 lb 14 4 oz)   SpO2 97%   BMI 29 68 kg/m²        Physical Exam           Jacquie Oviedo MD

## 2019-11-11 NOTE — ASSESSMENT & PLAN NOTE
Lab Results   Component Value Date    HGBA1C 6 4 (H) 10/07/2019    Well controlled  Continue same  Will continue to monitor  It was discussed about low carb diet

## 2019-12-10 ENCOUNTER — TELEPHONE (OUTPATIENT)
Dept: FAMILY MEDICINE CLINIC | Facility: CLINIC | Age: 84
End: 2019-12-10

## 2019-12-10 NOTE — TELEPHONE ENCOUNTER
Received Januvia pt asst form from pt  Form placed on Dr Concepcion Sullivan' s desk to be completed

## 2019-12-19 ENCOUNTER — OFFICE VISIT (OUTPATIENT)
Dept: FAMILY MEDICINE CLINIC | Facility: CLINIC | Age: 84
End: 2019-12-19
Payer: MEDICARE

## 2019-12-19 VITALS
HEART RATE: 107 BPM | DIASTOLIC BLOOD PRESSURE: 72 MMHG | HEIGHT: 63 IN | OXYGEN SATURATION: 95 % | WEIGHT: 164 LBS | SYSTOLIC BLOOD PRESSURE: 134 MMHG | BODY MASS INDEX: 29.06 KG/M2 | TEMPERATURE: 98.6 F | RESPIRATION RATE: 16 BRPM

## 2019-12-19 DIAGNOSIS — J01.00 ACUTE NON-RECURRENT MAXILLARY SINUSITIS: ICD-10-CM

## 2019-12-19 DIAGNOSIS — E11.9 DIABETES MELLITUS WITHOUT COMPLICATION (HCC): ICD-10-CM

## 2019-12-19 DIAGNOSIS — J01.00 ACUTE NON-RECURRENT MAXILLARY SINUSITIS: Primary | ICD-10-CM

## 2019-12-19 PROCEDURE — 99214 OFFICE O/P EST MOD 30 MIN: CPT | Performed by: FAMILY MEDICINE

## 2019-12-19 RX ORDER — FLUTICASONE PROPIONATE 50 MCG
2 SPRAY, SUSPENSION (ML) NASAL DAILY
Qty: 16 G | Refills: 0 | Status: SHIPPED | OUTPATIENT
Start: 2019-12-19 | End: 2019-12-19 | Stop reason: SDUPTHER

## 2019-12-19 RX ORDER — GUAIFENESIN AND CODEINE PHOSPHATE 100; 10 MG/5ML; MG/5ML
5 SOLUTION ORAL 3 TIMES DAILY PRN
Qty: 120 ML | Refills: 0 | Status: SHIPPED | OUTPATIENT
Start: 2019-12-19 | End: 2020-12-15 | Stop reason: ALTCHOICE

## 2019-12-19 RX ORDER — AZITHROMYCIN 250 MG/1
TABLET, FILM COATED ORAL
Qty: 6 TABLET | Refills: 0 | Status: SHIPPED | OUTPATIENT
Start: 2019-12-19 | End: 2019-12-23

## 2019-12-19 RX ORDER — PREDNISONE 50 MG/1
50 TABLET ORAL DAILY
Qty: 5 TABLET | Refills: 0 | Status: SHIPPED | OUTPATIENT
Start: 2019-12-19 | End: 2019-12-24

## 2019-12-19 NOTE — ASSESSMENT & PLAN NOTE
It was discussed about encouraging oral hydration and using humidifier at home  She was given prescriptions for Z-Reed, Flonase, prednisone and cough medicine with codeine  It was discussed about possible side effect of the medication  She was told her sugar might increase and she might have to watch her carb intake

## 2019-12-19 NOTE — PROGRESS NOTES
Assessment/Plan:  Acute non-recurrent maxillary sinusitis  It was discussed about encouraging oral hydration and using humidifier at home  She was given prescriptions for Z-Reed, Flonase, prednisone and cough medicine with codeine  It was discussed about possible side effect of the medication  She was told her sugar might increase and she might have to watch her carb intake  Diabetes mellitus without complication [facility])    Lab Results   Component Value Date    HGBA1C 6 4 (H) 10/07/2019    Well controlled  It was discussed with patient her sugar might increase the next few days while she is on prednisone  She is to monitor her blood sugar and try to cut down on her carbs  Herb Edgar  Age: 81 Data as of: 12/19/2019   No prescription data is available from your state  for this patient  Demographics     Linked Records         Search Criteria               Summary     Summary   Total Prescriptions: 0   Total Prescribers: 0   Total Pharmacies: 0   Narcotics*     (excluding buprenorphine)  Current Qty: 0   Current MME/day: 0 00   30 Day Avg MME/day: 0 00   Buprenorphine*   Current Qty: 0   Current mg/day: 0 00   30 Day Avg mg/day: 0 00      Prescriptions     PRESCRIPTIONS  Total Prescriptions: 0   Total Private Pay: 0   Fill Date ID Written Drug Qty Days Prescriber Rx # Pharmacy Refill Daily Dose * Pymt Type    *Per CDC guidance, the MME conversion factors prescribed or provided as part of the medication-assisted treatment for opioid use disorder should not be used to benchmark against dosage thresholds meant for opioids prescribed for pain  Buprenorphine products have no agreed upon morphine equivalency, and as partial opioid agonists, are not expected to be associated with overdose risk in the same dose-dependent manner as doses for full agonist opioids  MME = morphine milligram equivalents  LME = Lorazepam milligram equivalents  MG = dose in milligrams     PROVIDERS  Total Providers: 0   Name [address] [facility] Phone   PHARMACIES  Total Pharmacies: 0   Name [place]          Diagnoses and all orders for this visit:    Acute non-recurrent maxillary sinusitis  -     azithromycin (ZITHROMAX) 250 mg tablet; Take 2 tablets today then 1 tablet daily x 4 days  -     predniSONE 50 mg tablet; Take 1 tablet (50 mg total) by mouth daily for 5 days  -     fluticasone (FLONASE) 50 mcg/act nasal spray; 2 sprays into each nostril daily  -     guaifenesin-codeine (GUAIFENESIN AC) 100-10 MG/5ML liquid; Take 5 mL by mouth 3 (three) times a day as needed for cough    Di[facility] )          There are no Patient Instructions on file for this visit  Return if symptoms worsen or fail to improve  Subjective:      Patient ID: Linnell Mcardle is a 81 y o  female  Chief Complaint   Patient presents with   Claudis Quevedo Like Symptoms       She is here today with complaint of upper respiratory symptoms including nasal congestion, postnasal drip and sinus pressure  She also complained of productive cough and earache  She denies any fever but complained of having chills  The following portions of the patient's history were reviewed and updated as appropriate: allergies, current medications, past family history, past medical history, past social history, past surgical history and problem list     Review of Systems   Constitutional: Negative for activity change, chills and fever  HENT: Positive for congestion, postnasal drip, rhinorrhea and sinus pressure  Negative for trouble swallowing  Eyes: Negative for visual disturbance  Respiratory: Positive for cough  Negative for apnea and shortness of breath  Cardiovascular: Negative for chest pain, palpitations and leg swelling  Gastrointestinal: Negative for abdominal pain, constipation, diarrhea and vomiting  Endocrine: Negative for cold intolerance and heat intolerance     Genitourinary: Negative for difficulty urinating and dysuria  Musculoskeletal: Negative for gait problem  Skin: Negative for rash  Neurological: Negative for dizziness, tremors, seizures and headaches  Hematological: Negative for adenopathy  Psychiatric/Behavioral: Negative for behavioral problems  Current Outpatient Medications   Medication Sig Dispense Refill    aspirin (ECOTRIN LOW STRENGTH) 81 mg EC tablet Take 1 tablet (81 mg total) by mouth daily  0    atorvastatin (LIPITOR) 10 mg tablet Take 1 tablet (10 mg total) by mouth daily 120 tablet 0    glucose blood test strip Pt to test blood sugars twice daily 100 each 3    ILEVRO 0 3 % SUSP daily       irbesartan (AVAPRO) 75 mg tablet TAKE 1 TABLET BY MOUTH DAILY 120 tablet 0    Lancet Devices (ACCU-CHEK SOFTCLIX) lancets Use as instructed 100 each 3    LANCETS SUPER THIN 28G MISC use 1 lancet by Misc  (Non-Drug; Combo Route) route once TEST EVERY DAY      levETIRAcetam (KEPPRA) 750 mg tablet Take 1 tablet (750 mg total) by mouth 2 (two) times a day 180 tablet 3    RESTASIS 0 05 % ophthalmic emulsion INT 1 GTT IN OU Q 12 H  6    sitaGLIPtin (JANUVIA) 100 mg tablet Take 1 tablet (100 mg total) by mouth daily 90 tablet 1    triamcinolone (KENALOG) 0 5 % cream Apply topically 3 (three) times a day 30 g 1    azithromycin (ZITHROMAX) 250 mg tablet Take 2 tablets today then 1 tablet daily x 4 days 6 tablet 0    fluticasone (FLONASE) 50 mcg/act nasal spray 2 sprays into each nostril daily 16 g 0    guaifenesin-codeine (GUAIFENESIN AC) 100-10 MG/5ML liquid Take 5 mL by mouth 3 (three) times a day as needed for cough 120 mL 0    predniSONE 50 mg tablet Take 1 tablet (50 mg total) by mouth daily for 5 days 5 tablet 0     No current facility-administered medications for this visit          Objective:    /72 (BP Location: Left arm, Patient Position: Sitting, Cuff Size: Adult)   Pulse (!) 107   Temp 98 6 °F (37 °C) (Tympanic)   Resp 16   Ht 5' 2 5" (1 588 m)   Wt 74 4 kg (164 lb)   SpO2 95%   BMI 29 52 kg/m²        Physical Exam   Constitutional: She is oriented to person, place, and time  She appears well-developed and well-nourished  HENT:   Head: Normocephalic and atraumatic  Right Ear: A middle ear effusion is present  Left Ear: A middle ear effusion is present  Mouth/Throat: Posterior oropharyngeal erythema present  Eyes: Pupils are equal, round, and reactive to light  EOM are normal    Neck: Normal range of motion  Neck supple  Cardiovascular: Normal rate, regular rhythm and normal heart sounds  Pulmonary/Chest: Effort normal and breath sounds normal    Abdominal: Soft  Bowel sounds are normal    Musculoskeletal: Normal range of motion  She exhibits no edema  Lymphadenopathy:     She has no cervical adenopathy  Neurological: She is alert and oriented to person, place, and time  No cranial nerve deficit  Skin: Skin is warm and dry  Psychiatric: She has a normal mood and affect  Nursing note and vitals reviewed               Will Block MD

## 2019-12-20 RX ORDER — FLUTICASONE PROPIONATE 50 MCG
SPRAY, SUSPENSION (ML) NASAL
Qty: 48 G | Refills: 0 | Status: SHIPPED | OUTPATIENT
Start: 2019-12-20 | End: 2021-11-29

## 2020-01-02 ENCOUNTER — OFFICE VISIT (OUTPATIENT)
Dept: FAMILY MEDICINE CLINIC | Facility: CLINIC | Age: 85
End: 2020-01-02
Payer: MEDICARE

## 2020-01-02 VITALS
BODY MASS INDEX: 29.3 KG/M2 | RESPIRATION RATE: 16 BRPM | SYSTOLIC BLOOD PRESSURE: 138 MMHG | OXYGEN SATURATION: 95 % | TEMPERATURE: 97.8 F | WEIGHT: 165.4 LBS | DIASTOLIC BLOOD PRESSURE: 70 MMHG | HEART RATE: 92 BPM | HEIGHT: 63 IN

## 2020-01-02 DIAGNOSIS — B00.9 HERPES SIMPLEX VIRUS INFECTION: ICD-10-CM

## 2020-01-02 DIAGNOSIS — J06.9 ACUTE UPPER RESPIRATORY INFECTION: Primary | ICD-10-CM

## 2020-01-02 PROBLEM — J01.00 ACUTE NON-RECURRENT MAXILLARY SINUSITIS: Status: RESOLVED | Noted: 2019-12-19 | Resolved: 2020-01-02

## 2020-01-02 PROCEDURE — 99213 OFFICE O/P EST LOW 20 MIN: CPT | Performed by: PHYSICIAN ASSISTANT

## 2020-01-07 ENCOUNTER — TELEPHONE (OUTPATIENT)
Dept: FAMILY MEDICINE CLINIC | Facility: CLINIC | Age: 85
End: 2020-01-07

## 2020-02-02 DIAGNOSIS — I10 HYPERTENSION, UNSPECIFIED TYPE: ICD-10-CM

## 2020-02-02 RX ORDER — IRBESARTAN 75 MG/1
TABLET ORAL
Qty: 120 TABLET | Refills: 0 | Status: SHIPPED | OUTPATIENT
Start: 2020-02-02 | End: 2020-05-15

## 2020-02-28 DIAGNOSIS — E78.5 HYPERLIPIDEMIA, UNSPECIFIED HYPERLIPIDEMIA TYPE: ICD-10-CM

## 2020-02-28 RX ORDER — ATORVASTATIN CALCIUM 10 MG/1
TABLET, FILM COATED ORAL
Qty: 90 TABLET | Refills: 1 | Status: SHIPPED | OUTPATIENT
Start: 2020-02-28 | End: 2020-08-02

## 2020-04-01 NOTE — ASSESSMENT & PLAN NOTE
Lab Results   Component Value Date    HGBA1C 6 4 (H) 10/07/2019    Well controlled  It was discussed with patient her sugar might increase the next few days while she is on prednisone  She is to monitor her blood sugar and try to cut down on her carbs  Yes...

## 2020-05-04 ENCOUNTER — TELEPHONE (OUTPATIENT)
Dept: NEUROLOGY | Facility: CLINIC | Age: 85
End: 2020-05-04

## 2020-05-07 ENCOUNTER — TELEMEDICINE (OUTPATIENT)
Dept: NEUROLOGY | Facility: CLINIC | Age: 85
End: 2020-05-07
Payer: MEDICARE

## 2020-05-07 VITALS — BODY MASS INDEX: 28.7 KG/M2 | WEIGHT: 162 LBS | HEIGHT: 63 IN

## 2020-05-07 DIAGNOSIS — G40.209 PARTIAL SYMPTOMATIC EPILEPSY WITH COMPLEX PARTIAL SEIZURES, NOT INTRACTABLE, WITHOUT STATUS EPILEPTICUS (HCC): Primary | ICD-10-CM

## 2020-05-07 PROCEDURE — 99441 PR PHYS/QHP TELEPHONE EVALUATION 5-10 MIN: CPT | Performed by: PSYCHIATRY & NEUROLOGY

## 2020-05-13 ENCOUNTER — APPOINTMENT (OUTPATIENT)
Dept: LAB | Facility: IMAGING CENTER | Age: 85
End: 2020-05-13
Payer: MEDICARE

## 2020-05-13 DIAGNOSIS — G40.209 PARTIAL SYMPTOMATIC EPILEPSY WITH COMPLEX PARTIAL SEIZURES, NOT INTRACTABLE, WITHOUT STATUS EPILEPTICUS (HCC): ICD-10-CM

## 2020-05-13 DIAGNOSIS — E11.9 DIABETES MELLITUS WITHOUT COMPLICATION (HCC): ICD-10-CM

## 2020-05-13 DIAGNOSIS — E55.9 VITAMIN D INSUFFICIENCY: ICD-10-CM

## 2020-05-13 LAB
25(OH)D3 SERPL-MCNC: 73.5 NG/ML (ref 30–100)
ALBUMIN SERPL BCP-MCNC: 3.7 G/DL (ref 3.5–5)
ALP SERPL-CCNC: 66 U/L (ref 46–116)
ALT SERPL W P-5'-P-CCNC: 31 U/L (ref 12–78)
ANION GAP SERPL CALCULATED.3IONS-SCNC: 3 MMOL/L (ref 4–13)
AST SERPL W P-5'-P-CCNC: 20 U/L (ref 5–45)
BASOPHILS # BLD AUTO: 0.05 THOUSANDS/ΜL (ref 0–0.1)
BASOPHILS NFR BLD AUTO: 1 % (ref 0–1)
BILIRUB SERPL-MCNC: 0.31 MG/DL (ref 0.2–1)
BUN SERPL-MCNC: 12 MG/DL (ref 5–25)
CALCIUM SERPL-MCNC: 8.9 MG/DL (ref 8.3–10.1)
CHLORIDE SERPL-SCNC: 106 MMOL/L (ref 100–108)
CHOLEST SERPL-MCNC: 133 MG/DL (ref 50–200)
CO2 SERPL-SCNC: 30 MMOL/L (ref 21–32)
CREAT SERPL-MCNC: 0.79 MG/DL (ref 0.6–1.3)
CREAT UR-MCNC: 38.6 MG/DL
EOSINOPHIL # BLD AUTO: 0.22 THOUSAND/ΜL (ref 0–0.61)
EOSINOPHIL NFR BLD AUTO: 3 % (ref 0–6)
ERYTHROCYTE [DISTWIDTH] IN BLOOD BY AUTOMATED COUNT: 12.8 % (ref 11.6–15.1)
EST. AVERAGE GLUCOSE BLD GHB EST-MCNC: 134 MG/DL
GFR SERPL CREATININE-BSD FRML MDRD: 69 ML/MIN/1.73SQ M
GLUCOSE P FAST SERPL-MCNC: 131 MG/DL (ref 65–99)
HBA1C MFR BLD: 6.3 %
HCT VFR BLD AUTO: 42.5 % (ref 34.8–46.1)
HDLC SERPL-MCNC: 40 MG/DL
HGB BLD-MCNC: 13.4 G/DL (ref 11.5–15.4)
IMM GRANULOCYTES # BLD AUTO: 0.02 THOUSAND/UL (ref 0–0.2)
IMM GRANULOCYTES NFR BLD AUTO: 0 % (ref 0–2)
LDLC SERPL CALC-MCNC: 28 MG/DL (ref 0–100)
LYMPHOCYTES # BLD AUTO: 1.77 THOUSANDS/ΜL (ref 0.6–4.47)
LYMPHOCYTES NFR BLD AUTO: 28 % (ref 14–44)
MCH RBC QN AUTO: 28.8 PG (ref 26.8–34.3)
MCHC RBC AUTO-ENTMCNC: 31.5 G/DL (ref 31.4–37.4)
MCV RBC AUTO: 91 FL (ref 82–98)
MICROALBUMIN UR-MCNC: <5 MG/L (ref 0–20)
MICROALBUMIN/CREAT 24H UR: <13 MG/G CREATININE (ref 0–30)
MONOCYTES # BLD AUTO: 0.43 THOUSAND/ΜL (ref 0.17–1.22)
MONOCYTES NFR BLD AUTO: 7 % (ref 4–12)
NEUTROPHILS # BLD AUTO: 3.9 THOUSANDS/ΜL (ref 1.85–7.62)
NEUTS SEG NFR BLD AUTO: 61 % (ref 43–75)
NRBC BLD AUTO-RTO: 0 /100 WBCS
PLATELET # BLD AUTO: 233 THOUSANDS/UL (ref 149–390)
PMV BLD AUTO: 10.9 FL (ref 8.9–12.7)
POTASSIUM SERPL-SCNC: 4.3 MMOL/L (ref 3.5–5.3)
PROT SERPL-MCNC: 7.6 G/DL (ref 6.4–8.2)
RBC # BLD AUTO: 4.65 MILLION/UL (ref 3.81–5.12)
SODIUM SERPL-SCNC: 139 MMOL/L (ref 136–145)
TRIGL SERPL-MCNC: 324 MG/DL
TSH SERPL DL<=0.05 MIU/L-ACNC: 1.55 UIU/ML (ref 0.36–3.74)
WBC # BLD AUTO: 6.39 THOUSAND/UL (ref 4.31–10.16)

## 2020-05-13 PROCEDURE — 82043 UR ALBUMIN QUANTITATIVE: CPT | Performed by: FAMILY MEDICINE

## 2020-05-13 PROCEDURE — 82570 ASSAY OF URINE CREATININE: CPT | Performed by: FAMILY MEDICINE

## 2020-05-13 PROCEDURE — 80053 COMPREHEN METABOLIC PANEL: CPT

## 2020-05-13 PROCEDURE — 80177 DRUG SCRN QUAN LEVETIRACETAM: CPT

## 2020-05-13 PROCEDURE — 82306 VITAMIN D 25 HYDROXY: CPT

## 2020-05-13 PROCEDURE — 84443 ASSAY THYROID STIM HORMONE: CPT

## 2020-05-13 PROCEDURE — 36415 COLL VENOUS BLD VENIPUNCTURE: CPT

## 2020-05-13 PROCEDURE — 83036 HEMOGLOBIN GLYCOSYLATED A1C: CPT

## 2020-05-13 PROCEDURE — 80061 LIPID PANEL: CPT

## 2020-05-13 PROCEDURE — 85025 COMPLETE CBC W/AUTO DIFF WBC: CPT

## 2020-05-15 DIAGNOSIS — I10 HYPERTENSION, UNSPECIFIED TYPE: ICD-10-CM

## 2020-05-15 LAB — LEVETIRACETAM SERPL-MCNC: 19.3 UG/ML (ref 10–40)

## 2020-05-15 RX ORDER — IRBESARTAN 75 MG/1
TABLET ORAL
Qty: 120 TABLET | Refills: 0 | Status: SHIPPED | OUTPATIENT
Start: 2020-05-15 | End: 2020-09-21

## 2020-05-18 ENCOUNTER — OFFICE VISIT (OUTPATIENT)
Dept: FAMILY MEDICINE CLINIC | Facility: CLINIC | Age: 85
End: 2020-05-18
Payer: MEDICARE

## 2020-05-18 VITALS
RESPIRATION RATE: 16 BRPM | WEIGHT: 165.2 LBS | DIASTOLIC BLOOD PRESSURE: 80 MMHG | BODY MASS INDEX: 29.27 KG/M2 | OXYGEN SATURATION: 96 % | HEART RATE: 92 BPM | TEMPERATURE: 98.5 F | HEIGHT: 63 IN | SYSTOLIC BLOOD PRESSURE: 124 MMHG

## 2020-05-18 DIAGNOSIS — I10 ESSENTIAL HYPERTENSION: ICD-10-CM

## 2020-05-18 DIAGNOSIS — E11.9 DIABETES MELLITUS WITHOUT COMPLICATION (HCC): Primary | ICD-10-CM

## 2020-05-18 DIAGNOSIS — G40.209 PARTIAL SYMPTOMATIC EPILEPSY WITH COMPLEX PARTIAL SEIZURES, NOT INTRACTABLE, WITHOUT STATUS EPILEPTICUS (HCC): ICD-10-CM

## 2020-05-18 DIAGNOSIS — Z00.00 HEALTHCARE MAINTENANCE: ICD-10-CM

## 2020-05-18 DIAGNOSIS — E78.49 OTHER HYPERLIPIDEMIA: ICD-10-CM

## 2020-05-18 DIAGNOSIS — M65.4 DE QUERVAIN'S TENOSYNOVITIS: ICD-10-CM

## 2020-05-18 PROBLEM — H25.13 AGE-RELATED NUCLEAR CATARACT OF BOTH EYES: Status: ACTIVE | Noted: 2019-08-08

## 2020-05-18 PROCEDURE — 3044F HG A1C LEVEL LT 7.0%: CPT | Performed by: FAMILY MEDICINE

## 2020-05-18 PROCEDURE — G0438 PPPS, INITIAL VISIT: HCPCS | Performed by: FAMILY MEDICINE

## 2020-05-18 PROCEDURE — 3008F BODY MASS INDEX DOCD: CPT | Performed by: FAMILY MEDICINE

## 2020-05-18 PROCEDURE — 2022F DILAT RTA XM EVC RTNOPTHY: CPT | Performed by: FAMILY MEDICINE

## 2020-05-18 PROCEDURE — 1036F TOBACCO NON-USER: CPT | Performed by: FAMILY MEDICINE

## 2020-05-18 PROCEDURE — 1170F FXNL STATUS ASSESSED: CPT | Performed by: FAMILY MEDICINE

## 2020-05-18 PROCEDURE — 1160F RVW MEDS BY RX/DR IN RCRD: CPT | Performed by: FAMILY MEDICINE

## 2020-05-18 PROCEDURE — 3079F DIAST BP 80-89 MM HG: CPT | Performed by: FAMILY MEDICINE

## 2020-05-18 PROCEDURE — 99214 OFFICE O/P EST MOD 30 MIN: CPT | Performed by: FAMILY MEDICINE

## 2020-05-18 PROCEDURE — 3074F SYST BP LT 130 MM HG: CPT | Performed by: FAMILY MEDICINE

## 2020-05-18 PROCEDURE — 1125F AMNT PAIN NOTED PAIN PRSNT: CPT | Performed by: FAMILY MEDICINE

## 2020-08-01 DIAGNOSIS — E78.5 HYPERLIPIDEMIA, UNSPECIFIED HYPERLIPIDEMIA TYPE: ICD-10-CM

## 2020-08-02 RX ORDER — ATORVASTATIN CALCIUM 10 MG/1
TABLET, FILM COATED ORAL
Qty: 90 TABLET | Refills: 1 | Status: SHIPPED | OUTPATIENT
Start: 2020-08-02 | End: 2020-10-20 | Stop reason: SDUPTHER

## 2020-09-15 DIAGNOSIS — E11.9 TYPE 2 DIABETES MELLITUS WITHOUT COMPLICATION, UNSPECIFIED WHETHER LONG TERM INSULIN USE (HCC): ICD-10-CM

## 2020-09-21 DIAGNOSIS — I10 HYPERTENSION, UNSPECIFIED TYPE: ICD-10-CM

## 2020-09-21 RX ORDER — IRBESARTAN 75 MG/1
TABLET ORAL
Qty: 120 TABLET | Refills: 0 | Status: SHIPPED | OUTPATIENT
Start: 2020-09-21 | End: 2021-01-07 | Stop reason: SDUPTHER

## 2020-09-28 DIAGNOSIS — E11.9 TYPE 2 DIABETES MELLITUS WITHOUT COMPLICATION, UNSPECIFIED WHETHER LONG TERM INSULIN USE (HCC): ICD-10-CM

## 2020-10-07 ENCOUNTER — TELEPHONE (OUTPATIENT)
Dept: FAMILY MEDICINE CLINIC | Facility: CLINIC | Age: 85
End: 2020-10-07

## 2020-10-08 ENCOUNTER — CLINICAL SUPPORT (OUTPATIENT)
Dept: FAMILY MEDICINE CLINIC | Facility: CLINIC | Age: 85
End: 2020-10-08
Payer: MEDICARE

## 2020-10-08 DIAGNOSIS — Z23 IMMUNIZATION DUE: Primary | ICD-10-CM

## 2020-10-08 PROCEDURE — 90662 IIV NO PRSV INCREASED AG IM: CPT

## 2020-10-08 PROCEDURE — G0008 ADMIN INFLUENZA VIRUS VAC: HCPCS

## 2020-10-13 ENCOUNTER — LAB (OUTPATIENT)
Dept: LAB | Facility: IMAGING CENTER | Age: 85
End: 2020-10-13
Payer: MEDICARE

## 2020-10-13 DIAGNOSIS — E11.9 DIABETES MELLITUS WITHOUT COMPLICATION (HCC): ICD-10-CM

## 2020-10-13 LAB
ALBUMIN SERPL BCP-MCNC: 3.7 G/DL (ref 3.5–5)
ALP SERPL-CCNC: 64 U/L (ref 46–116)
ALT SERPL W P-5'-P-CCNC: 32 U/L (ref 12–78)
ANION GAP SERPL CALCULATED.3IONS-SCNC: 8 MMOL/L (ref 4–13)
AST SERPL W P-5'-P-CCNC: 20 U/L (ref 5–45)
BASOPHILS # BLD AUTO: 0.04 THOUSANDS/ΜL (ref 0–0.1)
BASOPHILS NFR BLD AUTO: 1 % (ref 0–1)
BILIRUB SERPL-MCNC: 0.32 MG/DL (ref 0.2–1)
BUN SERPL-MCNC: 14 MG/DL (ref 5–25)
CALCIUM SERPL-MCNC: 8.7 MG/DL (ref 8.3–10.1)
CHLORIDE SERPL-SCNC: 107 MMOL/L (ref 100–108)
CHOLEST SERPL-MCNC: 100 MG/DL (ref 50–200)
CO2 SERPL-SCNC: 25 MMOL/L (ref 21–32)
CREAT SERPL-MCNC: 0.84 MG/DL (ref 0.6–1.3)
EOSINOPHIL # BLD AUTO: 0.2 THOUSAND/ΜL (ref 0–0.61)
EOSINOPHIL NFR BLD AUTO: 3 % (ref 0–6)
ERYTHROCYTE [DISTWIDTH] IN BLOOD BY AUTOMATED COUNT: 12.7 % (ref 11.6–15.1)
EST. AVERAGE GLUCOSE BLD GHB EST-MCNC: 137 MG/DL
GFR SERPL CREATININE-BSD FRML MDRD: 64 ML/MIN/1.73SQ M
GLUCOSE P FAST SERPL-MCNC: 122 MG/DL (ref 65–99)
HBA1C MFR BLD: 6.4 %
HCT VFR BLD AUTO: 41 % (ref 34.8–46.1)
HDLC SERPL-MCNC: 39 MG/DL
HGB BLD-MCNC: 13 G/DL (ref 11.5–15.4)
IMM GRANULOCYTES # BLD AUTO: 0.01 THOUSAND/UL (ref 0–0.2)
IMM GRANULOCYTES NFR BLD AUTO: 0 % (ref 0–2)
LDLC SERPL CALC-MCNC: 27 MG/DL (ref 0–100)
LYMPHOCYTES # BLD AUTO: 1.19 THOUSANDS/ΜL (ref 0.6–4.47)
LYMPHOCYTES NFR BLD AUTO: 19 % (ref 14–44)
MCH RBC QN AUTO: 29.3 PG (ref 26.8–34.3)
MCHC RBC AUTO-ENTMCNC: 31.7 G/DL (ref 31.4–37.4)
MCV RBC AUTO: 92 FL (ref 82–98)
MONOCYTES # BLD AUTO: 0.47 THOUSAND/ΜL (ref 0.17–1.22)
MONOCYTES NFR BLD AUTO: 8 % (ref 4–12)
NEUTROPHILS # BLD AUTO: 4.3 THOUSANDS/ΜL (ref 1.85–7.62)
NEUTS SEG NFR BLD AUTO: 69 % (ref 43–75)
NRBC BLD AUTO-RTO: 0 /100 WBCS
PLATELET # BLD AUTO: 221 THOUSANDS/UL (ref 149–390)
PMV BLD AUTO: 10.4 FL (ref 8.9–12.7)
POTASSIUM SERPL-SCNC: 4.2 MMOL/L (ref 3.5–5.3)
PROT SERPL-MCNC: 7.4 G/DL (ref 6.4–8.2)
RBC # BLD AUTO: 4.44 MILLION/UL (ref 3.81–5.12)
SODIUM SERPL-SCNC: 140 MMOL/L (ref 136–145)
TRIGL SERPL-MCNC: 168 MG/DL
TSH SERPL DL<=0.05 MIU/L-ACNC: 1.3 UIU/ML (ref 0.36–3.74)
WBC # BLD AUTO: 6.21 THOUSAND/UL (ref 4.31–10.16)

## 2020-10-13 PROCEDURE — 36415 COLL VENOUS BLD VENIPUNCTURE: CPT

## 2020-10-13 PROCEDURE — 85025 COMPLETE CBC W/AUTO DIFF WBC: CPT

## 2020-10-13 PROCEDURE — 80061 LIPID PANEL: CPT

## 2020-10-13 PROCEDURE — 83036 HEMOGLOBIN GLYCOSYLATED A1C: CPT

## 2020-10-13 PROCEDURE — 80053 COMPREHEN METABOLIC PANEL: CPT

## 2020-10-13 PROCEDURE — 84443 ASSAY THYROID STIM HORMONE: CPT

## 2020-10-20 ENCOUNTER — OFFICE VISIT (OUTPATIENT)
Dept: FAMILY MEDICINE CLINIC | Facility: CLINIC | Age: 85
End: 2020-10-20
Payer: MEDICARE

## 2020-10-20 VITALS
OXYGEN SATURATION: 95 % | DIASTOLIC BLOOD PRESSURE: 70 MMHG | RESPIRATION RATE: 16 BRPM | WEIGHT: 168.25 LBS | HEIGHT: 63 IN | HEART RATE: 83 BPM | TEMPERATURE: 98.6 F | BODY MASS INDEX: 29.81 KG/M2 | SYSTOLIC BLOOD PRESSURE: 130 MMHG

## 2020-10-20 DIAGNOSIS — E78.5 HYPERLIPIDEMIA, UNSPECIFIED HYPERLIPIDEMIA TYPE: ICD-10-CM

## 2020-10-20 DIAGNOSIS — E11.9 DIABETES MELLITUS WITHOUT COMPLICATION (HCC): Primary | ICD-10-CM

## 2020-10-20 DIAGNOSIS — Z23 ENCOUNTER FOR IMMUNIZATION: ICD-10-CM

## 2020-10-20 DIAGNOSIS — E78.49 OTHER HYPERLIPIDEMIA: ICD-10-CM

## 2020-10-20 DIAGNOSIS — I10 ESSENTIAL HYPERTENSION: ICD-10-CM

## 2020-10-20 PROCEDURE — G0009 ADMIN PNEUMOCOCCAL VACCINE: HCPCS

## 2020-10-20 PROCEDURE — 99214 OFFICE O/P EST MOD 30 MIN: CPT | Performed by: FAMILY MEDICINE

## 2020-10-20 PROCEDURE — 90670 PCV13 VACCINE IM: CPT

## 2020-10-20 RX ORDER — ATORVASTATIN CALCIUM 10 MG/1
10 TABLET, FILM COATED ORAL DAILY
Qty: 90 TABLET | Refills: 1 | Status: SHIPPED | OUTPATIENT
Start: 2020-10-20 | End: 2021-05-06 | Stop reason: SDUPTHER

## 2020-10-20 RX ORDER — MULTIVITAMIN
1 TABLET ORAL DAILY
COMMUNITY

## 2020-11-10 ENCOUNTER — TELEPHONE (OUTPATIENT)
Dept: FAMILY MEDICINE CLINIC | Facility: CLINIC | Age: 85
End: 2020-11-10

## 2020-11-30 DIAGNOSIS — G40.802 OTHER EPILEPSY WITHOUT STATUS EPILEPTICUS, NOT INTRACTABLE (HCC): ICD-10-CM

## 2020-11-30 RX ORDER — LEVETIRACETAM 750 MG/1
750 TABLET ORAL 2 TIMES DAILY
Qty: 180 TABLET | Refills: 3 | Status: SHIPPED | OUTPATIENT
Start: 2020-11-30 | End: 2020-12-15 | Stop reason: SDUPTHER

## 2020-12-03 ENCOUNTER — TELEPHONE (OUTPATIENT)
Dept: FAMILY MEDICINE CLINIC | Facility: CLINIC | Age: 85
End: 2020-12-03

## 2020-12-08 ENCOUNTER — TELEPHONE (OUTPATIENT)
Dept: NEUROLOGY | Facility: CLINIC | Age: 85
End: 2020-12-08

## 2020-12-14 ENCOUNTER — TELEPHONE (OUTPATIENT)
Dept: NEUROLOGY | Facility: CLINIC | Age: 85
End: 2020-12-14

## 2020-12-14 ENCOUNTER — TELEPHONE (OUTPATIENT)
Dept: FAMILY MEDICINE CLINIC | Facility: CLINIC | Age: 85
End: 2020-12-14

## 2020-12-15 ENCOUNTER — OFFICE VISIT (OUTPATIENT)
Dept: NEUROLOGY | Facility: CLINIC | Age: 85
End: 2020-12-15
Payer: MEDICARE

## 2020-12-15 VITALS
SYSTOLIC BLOOD PRESSURE: 120 MMHG | BODY MASS INDEX: 29.84 KG/M2 | WEIGHT: 168.4 LBS | DIASTOLIC BLOOD PRESSURE: 60 MMHG | HEIGHT: 63 IN | HEART RATE: 76 BPM

## 2020-12-15 DIAGNOSIS — G40.209 PARTIAL SYMPTOMATIC EPILEPSY WITH COMPLEX PARTIAL SEIZURES, NOT INTRACTABLE, WITHOUT STATUS EPILEPTICUS (HCC): ICD-10-CM

## 2020-12-15 PROBLEM — M25.521 ELBOW PAIN, RIGHT: Status: RESOLVED | Noted: 2018-08-07 | Resolved: 2020-12-15

## 2020-12-15 PROBLEM — J06.9 ACUTE UPPER RESPIRATORY INFECTION: Status: RESOLVED | Noted: 2020-01-02 | Resolved: 2020-12-15

## 2020-12-15 PROBLEM — B00.9 HERPES SIMPLEX VIRUS INFECTION: Status: RESOLVED | Noted: 2020-01-02 | Resolved: 2020-12-15

## 2020-12-15 PROCEDURE — 99215 OFFICE O/P EST HI 40 MIN: CPT | Performed by: PSYCHIATRY & NEUROLOGY

## 2020-12-15 RX ORDER — LEVETIRACETAM 750 MG/1
750 TABLET ORAL 2 TIMES DAILY
Qty: 180 TABLET | Refills: 3 | Status: SHIPPED | OUTPATIENT
Start: 2020-12-15 | End: 2021-11-29 | Stop reason: SDUPTHER

## 2020-12-15 RX ORDER — PREDNISOLONE ACETATE 10 MG/ML
SUSPENSION/ DROPS OPHTHALMIC
COMMUNITY
Start: 2020-12-04 | End: 2021-11-29

## 2020-12-16 PROBLEM — G40.209 PARTIAL SYMPTOMATIC EPILEPSY WITH COMPLEX PARTIAL SEIZURES, NOT INTRACTABLE, WITHOUT STATUS EPILEPTICUS (HCC): Status: ACTIVE | Noted: 2020-12-16

## 2020-12-22 ENCOUNTER — TELEPHONE (OUTPATIENT)
Dept: FAMILY MEDICINE CLINIC | Facility: CLINIC | Age: 85
End: 2020-12-22

## 2021-01-07 ENCOUNTER — TELEPHONE (OUTPATIENT)
Dept: FAMILY MEDICINE CLINIC | Facility: CLINIC | Age: 86
End: 2021-01-07

## 2021-01-07 DIAGNOSIS — I10 HYPERTENSION, UNSPECIFIED TYPE: ICD-10-CM

## 2021-01-09 RX ORDER — IRBESARTAN 75 MG/1
75 TABLET ORAL DAILY
Qty: 120 TABLET | Refills: 0 | Status: SHIPPED | OUTPATIENT
Start: 2021-01-09 | End: 2021-05-13 | Stop reason: SDUPTHER

## 2021-05-06 DIAGNOSIS — E78.5 HYPERLIPIDEMIA, UNSPECIFIED HYPERLIPIDEMIA TYPE: ICD-10-CM

## 2021-05-06 RX ORDER — ATORVASTATIN CALCIUM 10 MG/1
10 TABLET, FILM COATED ORAL DAILY
Qty: 90 TABLET | Refills: 1 | Status: SHIPPED | OUTPATIENT
Start: 2021-05-06 | End: 2021-11-29

## 2021-05-06 NOTE — TELEPHONE ENCOUNTER
Fax received from [facility], refill Atorvastatin 10 mg tablets, #90, take 1 tablet by mouth daily

## 2021-05-06 NOTE — TELEPHONE ENCOUNTER
Pt last visit was 10/20/20 and she has appointment coming up on 5/25/21   Refill sent to provider for approval

## 2021-05-13 DIAGNOSIS — I10 HYPERTENSION, UNSPECIFIED TYPE: ICD-10-CM

## 2021-05-13 RX ORDER — IRBESARTAN 75 MG/1
TABLET ORAL
Qty: 90 TABLET | Refills: 1 | Status: SHIPPED | OUTPATIENT
Start: 2021-05-13 | End: 2021-06-10 | Stop reason: SDUPTHER

## 2021-05-13 RX ORDER — IRBESARTAN 75 MG/1
75 TABLET ORAL DAILY
Qty: 30 TABLET | Refills: 0 | Status: SHIPPED | OUTPATIENT
Start: 2021-05-13 | End: 2021-05-13

## 2021-05-13 NOTE — TELEPHONE ENCOUNTER
Pt requesting refill on Irbesartan 75 mg, take 1 tablet daily, #30  Call to [facility], [place]  Pt only has 1 pill left   Please call in asap

## 2021-05-19 ENCOUNTER — APPOINTMENT (OUTPATIENT)
Dept: LAB | Facility: IMAGING CENTER | Age: 86
End: 2021-05-19
Payer: MEDICARE

## 2021-05-19 DIAGNOSIS — E11.9 DIABETES MELLITUS WITHOUT COMPLICATION (HCC): ICD-10-CM

## 2021-05-19 LAB
ALBUMIN SERPL BCP-MCNC: 3.7 G/DL (ref 3.5–5)
ALP SERPL-CCNC: 62 U/L (ref 46–116)
ALT SERPL W P-5'-P-CCNC: 22 U/L (ref 12–78)
ANION GAP SERPL CALCULATED.3IONS-SCNC: 4 MMOL/L (ref 4–13)
AST SERPL W P-5'-P-CCNC: 14 U/L (ref 5–45)
BASOPHILS # BLD AUTO: 0.04 THOUSANDS/ΜL (ref 0–0.1)
BASOPHILS NFR BLD AUTO: 1 % (ref 0–1)
BILIRUB SERPL-MCNC: 0.51 MG/DL (ref 0.2–1)
BUN SERPL-MCNC: 20 MG/DL (ref 5–25)
CALCIUM SERPL-MCNC: 9.6 MG/DL (ref 8.3–10.1)
CHLORIDE SERPL-SCNC: 106 MMOL/L (ref 100–108)
CHOLEST SERPL-MCNC: 111 MG/DL (ref 50–200)
CO2 SERPL-SCNC: 32 MMOL/L (ref 21–32)
CREAT SERPL-MCNC: 0.8 MG/DL (ref 0.6–1.3)
EOSINOPHIL # BLD AUTO: 0.12 THOUSAND/ΜL (ref 0–0.61)
EOSINOPHIL NFR BLD AUTO: 2 % (ref 0–6)
ERYTHROCYTE [DISTWIDTH] IN BLOOD BY AUTOMATED COUNT: 13.2 % (ref 11.6–15.1)
EST. AVERAGE GLUCOSE BLD GHB EST-MCNC: 131 MG/DL
GFR SERPL CREATININE-BSD FRML MDRD: 67 ML/MIN/1.73SQ M
GLUCOSE P FAST SERPL-MCNC: 129 MG/DL (ref 65–99)
HBA1C MFR BLD: 6.2 %
HCT VFR BLD AUTO: 41.1 % (ref 34.8–46.1)
HDLC SERPL-MCNC: 47 MG/DL
HGB BLD-MCNC: 12.8 G/DL (ref 11.5–15.4)
IMM GRANULOCYTES # BLD AUTO: 0.01 THOUSAND/UL (ref 0–0.2)
IMM GRANULOCYTES NFR BLD AUTO: 0 % (ref 0–2)
LDLC SERPL CALC-MCNC: 45 MG/DL (ref 0–100)
LYMPHOCYTES # BLD AUTO: 1.31 THOUSANDS/ΜL (ref 0.6–4.47)
LYMPHOCYTES NFR BLD AUTO: 23 % (ref 14–44)
MCH RBC QN AUTO: 29.1 PG (ref 26.8–34.3)
MCHC RBC AUTO-ENTMCNC: 31.1 G/DL (ref 31.4–37.4)
MCV RBC AUTO: 93 FL (ref 82–98)
MONOCYTES # BLD AUTO: 0.44 THOUSAND/ΜL (ref 0.17–1.22)
MONOCYTES NFR BLD AUTO: 8 % (ref 4–12)
NEUTROPHILS # BLD AUTO: 3.9 THOUSANDS/ΜL (ref 1.85–7.62)
NEUTS SEG NFR BLD AUTO: 66 % (ref 43–75)
NRBC BLD AUTO-RTO: 0 /100 WBCS
PLATELET # BLD AUTO: 228 THOUSANDS/UL (ref 149–390)
PMV BLD AUTO: 10.7 FL (ref 8.9–12.7)
POTASSIUM SERPL-SCNC: 4.5 MMOL/L (ref 3.5–5.3)
PROT SERPL-MCNC: 7.5 G/DL (ref 6.4–8.2)
RBC # BLD AUTO: 4.4 MILLION/UL (ref 3.81–5.12)
SODIUM SERPL-SCNC: 142 MMOL/L (ref 136–145)
TRIGL SERPL-MCNC: 94 MG/DL
TSH SERPL DL<=0.05 MIU/L-ACNC: 0.88 UIU/ML (ref 0.36–3.74)
WBC # BLD AUTO: 5.82 THOUSAND/UL (ref 4.31–10.16)

## 2021-05-19 PROCEDURE — 80053 COMPREHEN METABOLIC PANEL: CPT

## 2021-05-19 PROCEDURE — 36415 COLL VENOUS BLD VENIPUNCTURE: CPT

## 2021-05-19 PROCEDURE — 80061 LIPID PANEL: CPT

## 2021-05-19 PROCEDURE — 84443 ASSAY THYROID STIM HORMONE: CPT

## 2021-05-19 PROCEDURE — 83036 HEMOGLOBIN GLYCOSYLATED A1C: CPT

## 2021-05-19 PROCEDURE — 85025 COMPLETE CBC W/AUTO DIFF WBC: CPT

## 2021-05-20 ENCOUNTER — TRANSCRIBE ORDERS (OUTPATIENT)
Dept: ADMINISTRATIVE | Facility: HOSPITAL | Age: 86
End: 2021-05-20

## 2021-05-20 ENCOUNTER — APPOINTMENT (OUTPATIENT)
Dept: LAB | Facility: IMAGING CENTER | Age: 86
End: 2021-05-20
Payer: MEDICARE

## 2021-05-20 DIAGNOSIS — E08.9 DIABETES MELLITUS DUE TO UNDERLYING CONDITION WITHOUT COMPLICATION, UNSPECIFIED WHETHER LONG TERM INSULIN USE (HCC): Primary | ICD-10-CM

## 2021-05-20 LAB
CREAT UR-MCNC: 104 MG/DL
MICROALBUMIN UR-MCNC: <5 MG/L (ref 0–20)
MICROALBUMIN/CREAT 24H UR: <5 MG/G CREATININE (ref 0–30)

## 2021-05-20 PROCEDURE — 82043 UR ALBUMIN QUANTITATIVE: CPT | Performed by: FAMILY MEDICINE

## 2021-05-20 PROCEDURE — 82570 ASSAY OF URINE CREATININE: CPT | Performed by: FAMILY MEDICINE

## 2021-05-21 ENCOUNTER — TELEPHONE (OUTPATIENT)
Dept: FAMILY MEDICINE CLINIC | Facility: CLINIC | Age: 86
End: 2021-05-21

## 2021-05-21 NOTE — TELEPHONE ENCOUNTER
----- Message from [address] sent at 5/21/2021  2:12 PM EDT -----  - A1C well controlled  - The rest of her labs are stable

## 2021-05-25 ENCOUNTER — OFFICE VISIT (OUTPATIENT)
Dept: FAMILY MEDICINE CLINIC | Facility: CLINIC | Age: 86
End: 2021-05-25
Payer: MEDICARE

## 2021-05-25 VITALS
HEART RATE: 68 BPM | HEIGHT: 63 IN | OXYGEN SATURATION: 96 % | SYSTOLIC BLOOD PRESSURE: 124 MMHG | WEIGHT: 166.38 LBS | TEMPERATURE: 97.6 F | BODY MASS INDEX: 29.48 KG/M2 | RESPIRATION RATE: 16 BRPM | DIASTOLIC BLOOD PRESSURE: 60 MMHG

## 2021-05-25 DIAGNOSIS — Z00.00 HEALTHCARE MAINTENANCE: ICD-10-CM

## 2021-05-25 DIAGNOSIS — I10 ESSENTIAL HYPERTENSION: ICD-10-CM

## 2021-05-25 DIAGNOSIS — G40.209 PARTIAL SYMPTOMATIC EPILEPSY WITH COMPLEX PARTIAL SEIZURES, NOT INTRACTABLE, WITHOUT STATUS EPILEPTICUS (HCC): ICD-10-CM

## 2021-05-25 DIAGNOSIS — E11.9 DIABETES MELLITUS WITHOUT COMPLICATION (HCC): Primary | ICD-10-CM

## 2021-05-25 DIAGNOSIS — E78.49 OTHER HYPERLIPIDEMIA: ICD-10-CM

## 2021-05-25 PROCEDURE — 99214 OFFICE O/P EST MOD 30 MIN: CPT | Performed by: FAMILY MEDICINE

## 2021-05-25 PROCEDURE — G0439 PPPS, SUBSEQ VISIT: HCPCS | Performed by: FAMILY MEDICINE

## 2021-05-25 PROCEDURE — 1123F ACP DISCUSS/DSCN MKR DOCD: CPT | Performed by: FAMILY MEDICINE

## 2021-05-25 NOTE — PROGRESS NOTES
Assessment and Plan:     Problem List Items Addressed This Visit        Endocrine    Diabetes mellitus without complication ([facility] ) - Primary       Well controlled  Continue same  Will continue to monitor  Lab Results   Component Value Date    HGBA1C 6 2 (H) 05/19/2021            Relevant Orders    CBC and differential    Comprehensive metabolic panel    Hemoglobin A1C    Lipid Panel with Direct LDL reflex    TSH, 3rd generation with Free T4 reflex       Cardiovascular and Mediastinum    Essential hypertension       Well controlled  Continue same  Will continue to monitor  Nervous and Auditory    Partial symptomatic epilepsy with complex partial seizures, not intractable, without status epilepticus (HCC)       Stable  Continue same  Will continue to monitor  Other    Other hyperlipidemia       Improved  Well controlled  Continue same  Will continue to monitor  Healthcare maintenance      It was discussed about immunizations, diet, exercise and safety measures  BMI 29 0-29 9,adult        BMI Counseling: Body mass index is 29 47 kg/m²  The BMI is above normal  Nutrition recommendations include decreasing portion sizes, encouraging healthy choices of fruits and vegetables and decreasing fast food intake  Falls Plan of Care: balance, strength, and gait training instructions were provided  Preventive health issues were discussed with patient, and age appropriate screening tests were ordered as noted in patient's After Visit Summary  Personalized health advice and appropriate referrals for health education or preventive services given if needed, as noted in patient's After Visit Summary       History of Present Illness:     Patient presents for Medicare Annual Wellness visit    Patient Care Team:  France Hawkins MD as PCP - General (Family Medicine)     Problem List:     Patient Active Problem List   Diagnosis    Diabetes mellitus without complication ([facility] )    Essential hypertension    Other hyperlipidemia    Healthcare maintenance    Age-related nuclear cataract of both eyes    Vitamin D insufficiency    De Quervain's tenosynovitis    BMI 29 0-29 9,adult    Partial symptomatic epilepsy with complex partial seizures, not intractable, without status epilepticus ([facility] )      Past Medical and Surgical History:     Past Medical History:   Diagnosis Date    Diabetes mellitus ([facility] )     Dyslipidemia     Hemochromatosis     Hypertension     Seizures ([facility] )      Past Surgical History:   Procedure Laterality Date    CATARACT EXTRACTION, BILATERAL Bilateral 10/2019    KNEE SURGERY Bilateral       Family History:     Family History   Problem Relation Age of Onset    No Known Problems Family     Diabetes Maternal Grandfather       Social History:     E-Cigarette/Vaping    E-Cigarette Use Never User      E-Cigarette/Vaping Substances    Nicotine No     CBD No     Flavoring No      Social History     Socioeconomic History    Marital status: /Civil Union     Spouse name: None    Number of children: None    Years of education: None    Highest education level: None   Occupational History    None   Social Needs    Financial resource strain: None    Food insecurity     Worry: None     Inability: None    Transportation needs     Medical: None     Non-medical: None   Tobacco Use    Smoking status: Never Smoker    Smokeless tobacco: Never Used   Substance and Sexual Activity    Alcohol use: Yes     Frequency: Monthly or less    Drug use: No    Sexual activity: None   Lifestyle    Physical activity     Days per week: None     Minutes per session: None    Stress: None   Relationships    Social connections     Talks on phone: None     Gets together: None     Attends Protestant service: None     Active member of club or organization: None     Attends meetings of clubs or organizations: None     Relationship status: None    Intimate partner violence     Fear of current or ex partner: None     Emotionally abused: None     Physically abused: None     Forced sexual activity: None   Other Topics Concern    None   Social History Narrative    None      Medications and Allergies:     Current Outpatient Medications   Medication Sig Dispense Refill    aspirin (ECOTRIN LOW STRENGTH) 81 mg EC tablet Take 1 tablet (81 mg total) by mouth daily  0    atorvastatin (LIPITOR) 10 mg tablet Take 1 tablet (10 mg total) by mouth daily 90 tablet 1    irbesartan (AVAPRO) 75 mg tablet TAKE 1 TABLET(75 MG) BY MOUTH DAILY 90 tablet 1    levETIRAcetam (KEPPRA) 750 mg tablet Take 1 tablet (750 mg total) by mouth 2 (two) times a day 180 tablet 3    Multiple Vitamin (multivitamin) tablet Take 1 tablet by mouth daily      sitaGLIPtin (JANUVIA) 100 mg tablet Take 1 tablet (100 mg total) by mouth daily 90 tablet 1    fluticasone (FLONASE) 50 mcg/act nasal spray USE 2 SPRAYS IN EACH NOSTRIL DAILY (Patient not taking: Reported on 5/25/2021) 48 g 0    glucose blood test strip Pt to test blood sugars twice daily 100 each 3    ILEVRO 0 3 % SUSP daily       Lancet Devices (ACCU-CHEK SOFTCLIX) lancets Use as instructed 100 each 3    Lancets Super Thin 28G MISC Pt test bllod sugar twice daily 100 each 1    prednisoLONE acetate (PRED FORTE) 1 % ophthalmic suspension        No current facility-administered medications for this visit  Allergies   Allergen Reactions    No Active Allergies       Immunizations:     Immunization History   Administered Date(s) Administered    Influenza, high dose seasonal 0 7 mL 10/08/2020    Pneumococcal Conjugate 13-Valent 10/20/2020    Tdap 07/27/2018      Health Maintenance: There are no preventive care reminders to display for this patient        Topic Date Due    COVID-19 Vaccine (1) Never done      Medicare Health Risk Assessment:     /60 (BP Location: Left arm, Patient Position: Sitting, Cuff Size: Adult)   Pulse 68   Temp 97 6 °F (36 4 °C) (Tympanic)   Resp 16   Ht 5' 3" (1 6 m)   Wt 75 5 kg (166 lb 6 oz)   SpO2 96%   BMI 29 47 kg/m²      Mol is here for her Subsequent Wellness visit  Health Risk Assessment:   Patient rates overall health as good  Patient feels that their physical health rating is same  Patient is satisfied with their life  Eyesight was rated as same  Hearing was rated as same  Patient feels that their emotional and mental health rating is same  Patients states they are sometimes angry  Patient states they are sometimes unusually tired/fatigued  Pain experienced in the last 7 days has been none  Patient states that she has experienced no weight loss or gain in last 6 months  Depression Screening:   PHQ-2 Score: 0      Fall Risk Screening: In the past year, patient has experienced: no history of falling in past year      Urinary Incontinence Screening:   Patient has not leaked urine accidently in the last six months  Home Safety:  Patient does not have trouble with stairs inside or outside of their home  Patient has working smoke alarms and has working carbon monoxide detector  Home safety hazards include: none  Nutrition:   Current diet is Regular  Medications:   Patient is not currently taking any over-the-counter supplements  Patient is able to manage medications  Activities of Daily Living (ADLs)/Instrumental Activities of Daily Living (IADLs):   Walk and transfer into and out of bed and chair?: Yes  Dress and groom yourself?: Yes    Bathe or shower yourself?: Yes    Feed yourself?  Yes  Do your laundry/housekeeping?: Yes  Manage your money, pay your bills and track your expenses?: Yes  Make your own meals?: Yes    Do your own shopping?: Yes    Previous Hospitalizations:   Any hospitalizations or ED visits within the last 12 months?: No      Advance Care Planning:   Living will: No    Durable POA for healthcare: No    Advanced directive: No      Cognitive Screening:   Provider or family/friend/caregiver concerned regarding cognition?: No    PREVENTIVE SCREENINGS      Cardiovascular Screening:    General: Screening Not Indicated and History Lipid Disorder      Diabetes Screening:     General: Screening Not Indicated and History Diabetes      Colorectal Cancer Screening:     General: Screening Not Indicated      Breast Cancer Screening:     General: Screening Not Indicated      Cervical Cancer Screening:    General: Screening Not Indicated      Osteoporosis Screening:    General: Risks and Benefits Discussed      Abdominal Aortic Aneurysm (AAA) Screening:        General: Screening Not Indicated      Lung Cancer Screening:     General: Screening Not Indicated      Hepatitis C Screening:    General: Risks and Benefits Discussed    Screening, Brief Intervention, and Referral to Treatment (SBIRT)    Screening  Typical number of drinks in a day: 0  Typical number of drinks in a week: 3  Interpretation: Low risk drinking behavior  Single Item Drug Screening:  How often have you used an illegal drug (including marijuana) or a prescription medication for non-medical reasons in the past year? never    Single Item Drug Screen Score: 0  Interpretation: Negative screen for possible drug use disorder    Brief Intervention  Alcohol & drug use screenings were reviewed  No concerns regarding substance use disorder identified         Gustabo Mcnulty MD

## 2021-05-26 NOTE — ASSESSMENT & PLAN NOTE
Well controlled  Continue same  Will continue to monitor    Lab Results   Component Value Date    HGBA1C 6 2 (H) 05/19/2021

## 2021-05-26 NOTE — PROGRESS NOTES
Assessment/Plan:  Diabetes mellitus without complication ([facility] )    Well controlled  Continue same  Will continue to monitor  Lab Results   Component Value Date    HGBA1C 6 2 (H) 05/19/2021       Partial symptomatic epilepsy with complex partial seizures, not intractable, without status epilepticus ([facility] )    Stable  Continue same  Will continue to monitor  Other hyperlipidemia    Improved  Well controlled  Continue same  Will continue to monitor  Healthcare maintenance   It was discussed about immunizations, diet, exercise and safety measures  Essential hypertension    Well controlled  Continue same  Will continue to monitor  Diagnoses and all orders for this visit:    Diabetes mellitus without complication ([facility] )  -     CBC and differential; Future  -     Comprehensive metabolic panel; Future  -     Hemoglobin A1C; Future  -     Lipid Panel with Direct LDL reflex; Future  -     TSH, 3rd generation with Free T4 reflex; Future    Partial symptomatic epilepsy with complex partial seizures, not intractable, without status epilepticus ([facility] )    Other hyperlipidemia    Healthcare maintenance    Essential hypertension    BMI 29 0-29 9,adult          There are no Patient Instructions on file for this visit  Return in about 6 months (around 11/25/2021)  Subjective:      Patient ID: Dasha Bower is a 81 y o  female  Chief Complaint   Patient presents with   [facility] Wellness Visit         She is here today for follow-up multiple medical problems  She has been taking her medications  She had her blood work done showed A1c well controlled and improved to 6 2  All the rest of blood work came back normal   Her cholesterol improved  She denies any complaint today        The following portions of the patient's history were reviewed and updated as appropriate: allergies, current medications, past family history, past medical history, past social history, past surgical history and problem list     Review of Systems   Constitutional: Negative for chills and fever  HENT: Negative for trouble swallowing  Eyes: Negative for visual disturbance  Respiratory: Negative for cough and shortness of breath  Cardiovascular: Negative for chest pain, palpitations and leg swelling  Gastrointestinal: Negative for abdominal pain, constipation and diarrhea  Endocrine: Negative for cold intolerance and heat intolerance  Genitourinary: Negative for difficulty urinating and dysuria  Musculoskeletal: Negative for gait problem  Skin: Negative for rash  Neurological: Negative for dizziness, tremors, seizures and headaches  Hematological: Negative for adenopathy  Psychiatric/Behavioral: Negative for behavioral problems  Current Outpatient Medications   Medication Sig Dispense Refill    aspirin (ECOTRIN LOW STRENGTH) 81 mg EC tablet Take 1 tablet (81 mg total) by mouth daily  0    atorvastatin (LIPITOR) 10 mg tablet Take 1 tablet (10 mg total) by mouth daily 90 tablet 1    irbesartan (AVAPRO) 75 mg tablet TAKE 1 TABLET(75 MG) BY MOUTH DAILY 90 tablet 1    levETIRAcetam (KEPPRA) 750 mg tablet Take 1 tablet (750 mg total) by mouth 2 (two) times a day 180 tablet 3    Multiple Vitamin (multivitamin) tablet Take 1 tablet by mouth daily      sitaGLIPtin (JANUVIA) 100 mg tablet Take 1 tablet (100 mg total) by mouth daily 90 tablet 1    fluticasone (FLONASE) 50 mcg/act nasal spray USE 2 SPRAYS IN EACH NOSTRIL DAILY (Patient not taking: Reported on 5/25/2021) 48 g 0    glucose blood test strip Pt to test blood sugars twice daily 100 each 3    ILEVRO 0 3 % SUSP daily       Lancet Devices (ACCU-CHEK SOFTCLIX) lancets Use as instructed 100 each 3    Lancets Super Thin 28G MISC Pt test bllod sugar twice daily 100 each 1    prednisoLONE acetate (PRED FORTE) 1 % ophthalmic suspension        No current facility-administered medications for this visit          Objective:    /60 (BP Location: Left arm, Patient Position: Sitting, Cuff Size: Adult)   Pulse 68   Temp 97 6 °F (36 4 °C) (Tympanic)   Resp 16   Ht 5' 3" (1 6 m)   Wt 75 5 kg (166 lb 6 oz)   SpO2 96%   BMI 29 47 kg/m²        Physical Exam  Vitals signs and nursing note reviewed  Constitutional:       Appearance: Normal appearance  She is well-developed  HENT:      Head: Normocephalic and atraumatic  Eyes:      Pupils: Pupils are equal, round, and reactive to light  Neck:      Musculoskeletal: Normal range of motion and neck supple  Cardiovascular:      Rate and Rhythm: Normal rate and regular rhythm  Heart sounds: Normal heart sounds  Pulmonary:      Effort: Pulmonary effort is normal       Breath sounds: Normal breath sounds  Abdominal:      General: Bowel sounds are normal       Palpations: Abdomen is soft  Musculoskeletal: Normal range of motion  Lymphadenopathy:      Cervical: No cervical adenopathy  Skin:     General: Skin is warm  Neurological:      Mental Status: She is alert and oriented to person, place, and time  Cranial Nerves: No cranial nerve deficit  Denny Rivera MD  BMI Counseling: Body mass index is 29 47 kg/m²  The BMI is above normal  Nutrition recommendations include reducing portion sizes, decreasing overall calorie intake and 3-5 servings of fruits/vegetables daily  Exercise recommendations include moderate aerobic physical activity for 150 minutes/week

## 2021-06-01 ENCOUNTER — TELEPHONE (OUTPATIENT)
Dept: NEUROLOGY | Facility: CLINIC | Age: 86
End: 2021-06-01

## 2021-06-01 NOTE — TELEPHONE ENCOUNTER
Patient called office requesting a refill  Call was disconnected before further information could be received  Attempted to call patient back, busy signal        Our office is only prescribing levetiracetam, which looks like was sent as a 3 month supply with 3 refills in December to [facility] in [facility] on [address]  Call placed to pharmacy, script is there and active just on hold  Patient has not been getting filled there  Attempted to call patient back, still getting continuous busy signal  No alternative numbers to try

## 2021-06-10 DIAGNOSIS — I10 HYPERTENSION, UNSPECIFIED TYPE: ICD-10-CM

## 2021-06-11 RX ORDER — IRBESARTAN 75 MG/1
75 TABLET ORAL DAILY
Qty: 90 TABLET | Refills: 1 | Status: SHIPPED | OUTPATIENT
Start: 2021-06-11 | End: 2021-12-13

## 2021-10-06 DIAGNOSIS — E11.9 TYPE 2 DIABETES MELLITUS WITHOUT COMPLICATION, UNSPECIFIED WHETHER LONG TERM INSULIN USE (HCC): ICD-10-CM

## 2021-10-07 RX ORDER — SITAGLIPTIN 100 MG/1
TABLET, FILM COATED ORAL
Qty: 90 TABLET | Refills: 2 | Status: SHIPPED | OUTPATIENT
Start: 2021-10-07

## 2021-11-09 ENCOUNTER — APPOINTMENT (OUTPATIENT)
Dept: LAB | Facility: IMAGING CENTER | Age: 86
End: 2021-11-09
Payer: MEDICARE

## 2021-11-09 DIAGNOSIS — E11.9 DIABETES MELLITUS WITHOUT COMPLICATION (HCC): ICD-10-CM

## 2021-11-09 LAB
ALBUMIN SERPL BCP-MCNC: 3.8 G/DL (ref 3.5–5)
ALP SERPL-CCNC: 62 U/L (ref 46–116)
ALT SERPL W P-5'-P-CCNC: 24 U/L (ref 12–78)
ANION GAP SERPL CALCULATED.3IONS-SCNC: 2 MMOL/L (ref 4–13)
AST SERPL W P-5'-P-CCNC: 17 U/L (ref 5–45)
BASOPHILS # BLD AUTO: 0.04 THOUSANDS/ΜL (ref 0–0.1)
BASOPHILS NFR BLD AUTO: 1 % (ref 0–1)
BILIRUB SERPL-MCNC: 0.51 MG/DL (ref 0.2–1)
BUN SERPL-MCNC: 20 MG/DL (ref 5–25)
CALCIUM SERPL-MCNC: 9.3 MG/DL (ref 8.3–10.1)
CHLORIDE SERPL-SCNC: 105 MMOL/L (ref 100–108)
CHOLEST SERPL-MCNC: 112 MG/DL (ref 50–200)
CO2 SERPL-SCNC: 32 MMOL/L (ref 21–32)
CREAT SERPL-MCNC: 0.92 MG/DL (ref 0.6–1.3)
EOSINOPHIL # BLD AUTO: 0.13 THOUSAND/ΜL (ref 0–0.61)
EOSINOPHIL NFR BLD AUTO: 2 % (ref 0–6)
ERYTHROCYTE [DISTWIDTH] IN BLOOD BY AUTOMATED COUNT: 13 % (ref 11.6–15.1)
EST. AVERAGE GLUCOSE BLD GHB EST-MCNC: 134 MG/DL
GFR SERPL CREATININE-BSD FRML MDRD: 57 ML/MIN/1.73SQ M
GLUCOSE P FAST SERPL-MCNC: 117 MG/DL (ref 65–99)
HBA1C MFR BLD: 6.3 %
HCT VFR BLD AUTO: 42 % (ref 34.8–46.1)
HDLC SERPL-MCNC: 40 MG/DL
HGB BLD-MCNC: 13 G/DL (ref 11.5–15.4)
IMM GRANULOCYTES # BLD AUTO: 0.01 THOUSAND/UL (ref 0–0.2)
IMM GRANULOCYTES NFR BLD AUTO: 0 % (ref 0–2)
LDLC SERPL CALC-MCNC: 44 MG/DL (ref 0–100)
LYMPHOCYTES # BLD AUTO: 1.29 THOUSANDS/ΜL (ref 0.6–4.47)
LYMPHOCYTES NFR BLD AUTO: 24 % (ref 14–44)
MCH RBC QN AUTO: 29 PG (ref 26.8–34.3)
MCHC RBC AUTO-ENTMCNC: 31 G/DL (ref 31.4–37.4)
MCV RBC AUTO: 94 FL (ref 82–98)
MONOCYTES # BLD AUTO: 0.41 THOUSAND/ΜL (ref 0.17–1.22)
MONOCYTES NFR BLD AUTO: 8 % (ref 4–12)
NEUTROPHILS # BLD AUTO: 3.61 THOUSANDS/ΜL (ref 1.85–7.62)
NEUTS SEG NFR BLD AUTO: 65 % (ref 43–75)
NRBC BLD AUTO-RTO: 0 /100 WBCS
PLATELET # BLD AUTO: 257 THOUSANDS/UL (ref 149–390)
PMV BLD AUTO: 10.5 FL (ref 8.9–12.7)
POTASSIUM SERPL-SCNC: 4.7 MMOL/L (ref 3.5–5.3)
PROT SERPL-MCNC: 7.7 G/DL (ref 6.4–8.2)
RBC # BLD AUTO: 4.49 MILLION/UL (ref 3.81–5.12)
SODIUM SERPL-SCNC: 139 MMOL/L (ref 136–145)
TRIGL SERPL-MCNC: 138 MG/DL
TSH SERPL DL<=0.05 MIU/L-ACNC: 0.97 UIU/ML (ref 0.36–3.74)
WBC # BLD AUTO: 5.49 THOUSAND/UL (ref 4.31–10.16)

## 2021-11-09 PROCEDURE — 83036 HEMOGLOBIN GLYCOSYLATED A1C: CPT

## 2021-11-09 PROCEDURE — 84443 ASSAY THYROID STIM HORMONE: CPT

## 2021-11-09 PROCEDURE — 80061 LIPID PANEL: CPT

## 2021-11-09 PROCEDURE — 36415 COLL VENOUS BLD VENIPUNCTURE: CPT

## 2021-11-09 PROCEDURE — 85025 COMPLETE CBC W/AUTO DIFF WBC: CPT

## 2021-11-09 PROCEDURE — 80053 COMPREHEN METABOLIC PANEL: CPT

## 2021-11-23 ENCOUNTER — TELEPHONE (OUTPATIENT)
Dept: NEUROLOGY | Facility: CLINIC | Age: 86
End: 2021-11-23

## 2021-11-23 ENCOUNTER — OFFICE VISIT (OUTPATIENT)
Dept: FAMILY MEDICINE CLINIC | Facility: CLINIC | Age: 86
End: 2021-11-23
Payer: MEDICARE

## 2021-11-23 VITALS
OXYGEN SATURATION: 94 % | HEART RATE: 92 BPM | DIASTOLIC BLOOD PRESSURE: 60 MMHG | WEIGHT: 168.8 LBS | BODY MASS INDEX: 29.91 KG/M2 | HEIGHT: 63 IN | SYSTOLIC BLOOD PRESSURE: 136 MMHG | RESPIRATION RATE: 16 BRPM | TEMPERATURE: 98.1 F

## 2021-11-23 DIAGNOSIS — Z23 IMMUNIZATION DUE: ICD-10-CM

## 2021-11-23 DIAGNOSIS — E11.9 TYPE 2 DIABETES MELLITUS WITHOUT COMPLICATION, UNSPECIFIED WHETHER LONG TERM INSULIN USE (HCC): ICD-10-CM

## 2021-11-23 DIAGNOSIS — E11.9 DIABETES MELLITUS WITHOUT COMPLICATION (HCC): Primary | ICD-10-CM

## 2021-11-23 DIAGNOSIS — E78.49 OTHER HYPERLIPIDEMIA: ICD-10-CM

## 2021-11-23 DIAGNOSIS — N18.31 STAGE 3A CHRONIC KIDNEY DISEASE (HCC): ICD-10-CM

## 2021-11-23 DIAGNOSIS — I10 ESSENTIAL HYPERTENSION: ICD-10-CM

## 2021-11-23 PROCEDURE — 90732 PPSV23 VACC 2 YRS+ SUBQ/IM: CPT

## 2021-11-23 PROCEDURE — G0009 ADMIN PNEUMOCOCCAL VACCINE: HCPCS

## 2021-11-23 PROCEDURE — 99214 OFFICE O/P EST MOD 30 MIN: CPT | Performed by: FAMILY MEDICINE

## 2021-11-23 PROCEDURE — 90662 IIV NO PRSV INCREASED AG IM: CPT

## 2021-11-23 PROCEDURE — G0008 ADMIN INFLUENZA VIRUS VAC: HCPCS

## 2021-11-27 DIAGNOSIS — E78.5 HYPERLIPIDEMIA, UNSPECIFIED HYPERLIPIDEMIA TYPE: ICD-10-CM

## 2021-11-29 ENCOUNTER — OFFICE VISIT (OUTPATIENT)
Dept: NEUROLOGY | Facility: CLINIC | Age: 86
End: 2021-11-29
Payer: MEDICARE

## 2021-11-29 VITALS
HEART RATE: 88 BPM | SYSTOLIC BLOOD PRESSURE: 145 MMHG | HEIGHT: 63 IN | DIASTOLIC BLOOD PRESSURE: 69 MMHG | WEIGHT: 165 LBS | BODY MASS INDEX: 29.23 KG/M2

## 2021-11-29 DIAGNOSIS — H93.19 TINNITUS, UNSPECIFIED LATERALITY: ICD-10-CM

## 2021-11-29 DIAGNOSIS — G40.209 PARTIAL SYMPTOMATIC EPILEPSY WITH COMPLEX PARTIAL SEIZURES, NOT INTRACTABLE, WITHOUT STATUS EPILEPTICUS (HCC): Primary | ICD-10-CM

## 2021-11-29 PROCEDURE — 99214 OFFICE O/P EST MOD 30 MIN: CPT | Performed by: PSYCHIATRY & NEUROLOGY

## 2021-11-29 RX ORDER — ATORVASTATIN CALCIUM 10 MG/1
TABLET, FILM COATED ORAL
Qty: 90 TABLET | Refills: 1 | Status: SHIPPED | OUTPATIENT
Start: 2021-11-29 | End: 2022-05-31

## 2021-11-29 RX ORDER — LEVETIRACETAM 750 MG/1
750 TABLET ORAL 2 TIMES DAILY
Qty: 180 TABLET | Refills: 3 | Status: SHIPPED | OUTPATIENT
Start: 2021-11-29

## 2021-12-12 DIAGNOSIS — I10 HYPERTENSION, UNSPECIFIED TYPE: ICD-10-CM

## 2021-12-13 RX ORDER — IRBESARTAN 75 MG/1
TABLET ORAL
Qty: 90 TABLET | Refills: 1 | Status: SHIPPED | OUTPATIENT
Start: 2021-12-13 | End: 2022-05-31 | Stop reason: SDUPTHER

## 2022-01-04 ENCOUNTER — TELEPHONE (OUTPATIENT)
Dept: FAMILY MEDICINE CLINIC | Facility: CLINIC | Age: 87
End: 2022-01-04

## 2022-01-04 NOTE — TELEPHONE ENCOUNTER
Phone call from patient, states she needs a patient assistance form updated for this year  She will drop off form, [facility] Shona helped her with this in the past  Form placed on clipboard for [place]

## 2022-05-06 ENCOUNTER — TELEPHONE (OUTPATIENT)
Dept: FAMILY MEDICINE CLINIC | Facility: CLINIC | Age: 87
End: 2022-05-06

## 2022-05-06 NOTE — TELEPHONE ENCOUNTER
Phone call to pt as per Shona, she is sched for an appt on 5/10 & she isn't due until 5/26/22 for her AWV  Left msg for pt to call to resched appt

## 2022-05-25 ENCOUNTER — APPOINTMENT (OUTPATIENT)
Dept: LAB | Facility: IMAGING CENTER | Age: 87
End: 2022-05-25
Payer: MEDICARE

## 2022-05-25 DIAGNOSIS — G40.209 PARTIAL SYMPTOMATIC EPILEPSY WITH COMPLEX PARTIAL SEIZURES, NOT INTRACTABLE, WITHOUT STATUS EPILEPTICUS (HCC): ICD-10-CM

## 2022-05-25 DIAGNOSIS — E11.9 TYPE 2 DIABETES MELLITUS WITHOUT COMPLICATION, UNSPECIFIED WHETHER LONG TERM INSULIN USE (HCC): ICD-10-CM

## 2022-05-25 LAB
ALBUMIN SERPL BCP-MCNC: 3.5 G/DL (ref 3.5–5)
ALP SERPL-CCNC: 65 U/L (ref 46–116)
ALT SERPL W P-5'-P-CCNC: 24 U/L (ref 12–78)
ANION GAP SERPL CALCULATED.3IONS-SCNC: 4 MMOL/L (ref 4–13)
AST SERPL W P-5'-P-CCNC: 19 U/L (ref 5–45)
BASOPHILS # BLD AUTO: 0.05 THOUSANDS/ΜL (ref 0–0.1)
BASOPHILS NFR BLD AUTO: 1 % (ref 0–1)
BILIRUB SERPL-MCNC: 1.64 MG/DL (ref 0.2–1)
BUN SERPL-MCNC: 15 MG/DL (ref 5–25)
CALCIUM SERPL-MCNC: 9.6 MG/DL (ref 8.3–10.1)
CHLORIDE SERPL-SCNC: 106 MMOL/L (ref 100–108)
CHOLEST SERPL-MCNC: 112 MG/DL
CO2 SERPL-SCNC: 31 MMOL/L (ref 21–32)
CREAT SERPL-MCNC: 0.76 MG/DL (ref 0.6–1.3)
EOSINOPHIL # BLD AUTO: 0.25 THOUSAND/ΜL (ref 0–0.61)
EOSINOPHIL NFR BLD AUTO: 5 % (ref 0–6)
ERYTHROCYTE [DISTWIDTH] IN BLOOD BY AUTOMATED COUNT: 12.8 % (ref 11.6–15.1)
GFR SERPL CREATININE-BSD FRML MDRD: 71 ML/MIN/1.73SQ M
GLUCOSE P FAST SERPL-MCNC: 119 MG/DL (ref 65–99)
HCT VFR BLD AUTO: 42.1 % (ref 34.8–46.1)
HDLC SERPL-MCNC: 49 MG/DL
HGB BLD-MCNC: 12.9 G/DL (ref 11.5–15.4)
IMM GRANULOCYTES # BLD AUTO: 0.02 THOUSAND/UL (ref 0–0.2)
IMM GRANULOCYTES NFR BLD AUTO: 0 % (ref 0–2)
LDLC SERPL CALC-MCNC: 43 MG/DL (ref 0–100)
LYMPHOCYTES # BLD AUTO: 1.41 THOUSANDS/ΜL (ref 0.6–4.47)
LYMPHOCYTES NFR BLD AUTO: 25 % (ref 14–44)
MCH RBC QN AUTO: 29 PG (ref 26.8–34.3)
MCHC RBC AUTO-ENTMCNC: 30.6 G/DL (ref 31.4–37.4)
MCV RBC AUTO: 95 FL (ref 82–98)
MONOCYTES # BLD AUTO: 0.45 THOUSAND/ΜL (ref 0.17–1.22)
MONOCYTES NFR BLD AUTO: 8 % (ref 4–12)
NEUTROPHILS # BLD AUTO: 3.39 THOUSANDS/ΜL (ref 1.85–7.62)
NEUTS SEG NFR BLD AUTO: 61 % (ref 43–75)
NRBC BLD AUTO-RTO: 0 /100 WBCS
PLATELET # BLD AUTO: 239 THOUSANDS/UL (ref 149–390)
PMV BLD AUTO: 11.6 FL (ref 8.9–12.7)
POTASSIUM SERPL-SCNC: 4.3 MMOL/L (ref 3.5–5.3)
PROT SERPL-MCNC: 7.4 G/DL (ref 6.4–8.2)
RBC # BLD AUTO: 4.45 MILLION/UL (ref 3.81–5.12)
SODIUM SERPL-SCNC: 141 MMOL/L (ref 136–145)
TRIGL SERPL-MCNC: 99 MG/DL
TSH SERPL DL<=0.05 MIU/L-ACNC: 1.22 UIU/ML (ref 0.45–4.5)
WBC # BLD AUTO: 5.57 THOUSAND/UL (ref 4.31–10.16)

## 2022-05-25 PROCEDURE — 80061 LIPID PANEL: CPT

## 2022-05-25 PROCEDURE — 83036 HEMOGLOBIN GLYCOSYLATED A1C: CPT

## 2022-05-25 PROCEDURE — 80177 DRUG SCRN QUAN LEVETIRACETAM: CPT

## 2022-05-25 PROCEDURE — 36415 COLL VENOUS BLD VENIPUNCTURE: CPT

## 2022-05-25 PROCEDURE — 80053 COMPREHEN METABOLIC PANEL: CPT

## 2022-05-25 PROCEDURE — 85025 COMPLETE CBC W/AUTO DIFF WBC: CPT

## 2022-05-25 PROCEDURE — 84443 ASSAY THYROID STIM HORMONE: CPT

## 2022-05-26 LAB
EST. AVERAGE GLUCOSE BLD GHB EST-MCNC: 128 MG/DL
HBA1C MFR BLD: 6.1 %

## 2022-05-27 LAB — LEVETIRACETAM SERPL-MCNC: 20.8 UG/ML (ref 10–40)

## 2022-05-31 ENCOUNTER — OFFICE VISIT (OUTPATIENT)
Dept: FAMILY MEDICINE CLINIC | Facility: CLINIC | Age: 87
End: 2022-05-31
Payer: MEDICARE

## 2022-05-31 VITALS
SYSTOLIC BLOOD PRESSURE: 134 MMHG | WEIGHT: 165.38 LBS | DIASTOLIC BLOOD PRESSURE: 70 MMHG | BODY MASS INDEX: 29.3 KG/M2 | OXYGEN SATURATION: 95 % | HEIGHT: 63 IN | HEART RATE: 88 BPM | RESPIRATION RATE: 16 BRPM | TEMPERATURE: 98.2 F

## 2022-05-31 DIAGNOSIS — G40.209 PARTIAL SYMPTOMATIC EPILEPSY WITH COMPLEX PARTIAL SEIZURES, NOT INTRACTABLE, WITHOUT STATUS EPILEPTICUS (HCC): ICD-10-CM

## 2022-05-31 DIAGNOSIS — E11.9 DIABETES MELLITUS WITHOUT COMPLICATION (HCC): Primary | ICD-10-CM

## 2022-05-31 DIAGNOSIS — I10 HYPERTENSION, UNSPECIFIED TYPE: ICD-10-CM

## 2022-05-31 DIAGNOSIS — E78.5 HYPERLIPIDEMIA, UNSPECIFIED HYPERLIPIDEMIA TYPE: ICD-10-CM

## 2022-05-31 DIAGNOSIS — E78.49 OTHER HYPERLIPIDEMIA: ICD-10-CM

## 2022-05-31 DIAGNOSIS — Z00.00 HEALTHCARE MAINTENANCE: ICD-10-CM

## 2022-05-31 DIAGNOSIS — I10 ESSENTIAL HYPERTENSION: ICD-10-CM

## 2022-05-31 DIAGNOSIS — N18.31 STAGE 3A CHRONIC KIDNEY DISEASE (HCC): ICD-10-CM

## 2022-05-31 PROCEDURE — 99214 OFFICE O/P EST MOD 30 MIN: CPT | Performed by: FAMILY MEDICINE

## 2022-05-31 PROCEDURE — G0439 PPPS, SUBSEQ VISIT: HCPCS | Performed by: FAMILY MEDICINE

## 2022-05-31 RX ORDER — IRBESARTAN 75 MG/1
75 TABLET ORAL DAILY
Qty: 90 TABLET | Refills: 1 | Status: SHIPPED | OUTPATIENT
Start: 2022-05-31

## 2022-05-31 RX ORDER — ATORVASTATIN CALCIUM 10 MG/1
TABLET, FILM COATED ORAL
Qty: 90 TABLET | Refills: 1 | Status: SHIPPED | OUTPATIENT
Start: 2022-05-31

## 2022-05-31 NOTE — ASSESSMENT & PLAN NOTE
Lab Results   Component Value Date    EGFR 71 05/25/2022    EGFR 57 11/09/2021    EGFR 67 05/19/2021    CREATININE 0 76 05/25/2022    CREATININE 0 92 11/09/2021    CREATININE 0 80 05/19/2021   Improved  Continue to encourage oral hydration  Will continue to monitor her GFR

## 2022-05-31 NOTE — PROGRESS NOTES
Assessment and Plan:     Problem List Items Addressed This Visit        Endocrine    Diabetes mellitus without complication ([facility] [address] ) - Primary     Well controlled  Continue same  Will continue to monitor A1c  Continue with low carb diet  Lab Results   Component Value Date    HGBA1C 6 1 (H) 05/25/2022              Relevant Orders    CBC and differential    Comprehensive metabolic panel    Hemoglobin A1C    Lipid Panel with Direct LDL reflex    TSH, 3rd generation with Free T4 reflex       Cardiovascular and Mediastinum    Essential hypertension     Well controlled  Continue same  Will continue to monitor  Relevant Medications    irbesartan (AVAPRO) 75 mg tablet       Nervous and Auditory    Partial symptomatic epilepsy with complex partial seizures, not intractable, without status epilepticus (HCC)     Stable  Continue same  Continue follow-up with Neurology  Genitourinary    Stage 3a chronic kidney disease [facility])     Lab Results   Component Value Date    EGFR 71 05/25/2022    EGFR 57 11/09/2021    EGFR 67 05/19/2021    CREATININE 0 76 05/25/2022    CREATININE 0 92 11/09/2021    CREATININE 0 80 05/19/2021   Improved  Continue to encourage oral hydration  Will continue to monitor her GFR  Other    Other hyperlipidemia     Stable  Continue on atorvastatin  Will continue to monitor fasting lipid profile  Healthcare maintenance     It was discussed about immunizations, diet, exercise and safety measures  Other Visit Diagnoses     Hypertension, unspecified type        changing the valsartan to Avapro, will referred the patient to the cardiologist for further eval    Relevant Medications    irbesartan (AVAPRO) 75 mg tablet        BMI Counseling: Body mass index is 29 29 kg/m²  The BMI is above normal  Nutrition recommendations include decreasing portion sizes, encouraging healthy choices of fruits and vegetables and decreasing fast food intake   Rationale for BMI follow-up plan is due to patient being overweight or obese  Depression Screening and Follow-up Plan: Patient was screened for depression during today's encounter  They screened negative with a PHQ-2 score of 0  Falls Plan of Care: balance, strength, and gait training instructions were provided  Preventive health issues were discussed with patient, and age appropriate screening tests were ordered as noted in patient's After Visit Summary  Personalized health advice and appropriate referrals for health education or preventive services given if needed, as noted in patient's After Visit Summary       History of Present Illness:     Patient presents for Medicare Annual Wellness visit    Patient Care Team:  Gemini Austin MD as PCP - General (Family Medicine)     Problem List:     Patient Active Problem List   Diagnosis    Diabetes mellitus without complication ([facility] [address] )    Essential hypertension    Other hyperlipidemia    Healthcare maintenance    Age-related nuclear cataract of both eyes    Vitamin D insufficiency    De Quervain's tenosynovitis    BMI 29 0-29 9,adult    Partial symptomatic epilepsy with complex partial seizures, not intractable, without status epilepticus ([address] )    Stage 3a chronic kidney disease ([address] )    Tinnitus      Past Medical and Surgical History:     Past Medical History:   Diagnosis Date    Diabetes mellitus ([address] )     Dyslipidemia     Hemochromatosis     Hypertension     Seizures ([address] )      Past Surgical History:   Procedure Laterality Date    CATARACT EXTRACTION, BILATERAL Bilateral 10/2019    KNEE SURGERY Bilateral       Family History:     Family History   Problem Relation Age of Onset    No Known Problems Family     Diabetes Maternal Grandfather       Social History:     Social History     Socioeconomic History    Marital status: /Civil Union     Spouse name: None    Number of children: None    Years of education: None    Highest education level: None   Occupational History    None   Tobacco Use    Smoking status: Never Smoker    Smokeless tobacco: Never Used   Vaping Use    Vaping Use: Never used   Substance and Sexual Activity    Alcohol use: Yes    Drug use: No    Sexual activity: None   Other Topics Concern    None   Social History Narrative    None     Social Determinants of Health     Financial Resource Strain: Not on file   Food Insecurity: Not on file   Transportation Needs: Not on file   Physical Activity: Not on file   Stress: Not on file   Social Connections: Not on file   Intimate Partner Violence: Not on file   Housing Stability: Not on file      Medications and Allergies:     Current Outpatient Medications   Medication Sig Dispense Refill    aspirin (ECOTRIN LOW STRENGTH) 81 mg EC tablet Take 1 tablet (81 mg total) by mouth daily  0    atorvastatin (LIPITOR) 10 mg tablet TAKE 1 TABLET(10 MG) BY MOUTH DAILY 90 tablet 1    glucose blood test strip Pt to test blood sugars twice daily 100 each 3    irbesartan (AVAPRO) 75 mg tablet Take 1 tablet (75 mg total) by mouth daily 90 tablet 1    Januvia 100 MG tablet TAKE ONE TABLET BY MOUTH DAILY 90 tablet 2    Lancet Devices (ACCU-CHEK SOFTCLIX) lancets Use as instructed 100 each 3    Lancets Super Thin 28G MISC Pt test bllod sugar twice daily 100 each 1    levETIRAcetam (KEPPRA) 750 mg tablet Take 1 tablet (750 mg total) by mouth 2 (two) times a day 180 tablet 3    Multiple Vitamin (multivitamin) tablet Take 1 tablet by mouth daily       No current facility-administered medications for this visit       Allergies   Allergen Reactions    No Active Allergies       Immunizations:     Immunization History   Administered Date(s) Administered    COVID-19 MODERNA VACC 0 5 ML IM 01/12/2021, 02/09/2021    Influenza, high dose seasonal 0 7 mL 10/08/2020, 11/23/2021    Pneumococcal Conjugate 13-Valent 10/20/2020    Pneumococcal Polysaccharide PPV23 11/23/2021    Tdap 07/27/2018 Health Maintenance: There are no preventive care reminders to display for this patient  Topic Date Due    COVID-19 Vaccine (3 - Booster for Moderna series) 07/09/2021      Medicare Health Risk Assessment:     /70 (BP Location: Right arm, Patient Position: Sitting, Cuff Size: Adult)   Pulse 88   Temp 98 2 °F (36 8 °C) (Tympanic)   Resp 16   Ht 5' 3" (1 6 m)   Wt 75 kg (165 lb 6 oz)   SpO2 95%   BMI 29 29 kg/m²      Melania Rincon is here for her Subsequent Wellness visit  Health Risk Assessment:   Patient rates overall health as good  Patient feels that their physical health rating is same  Patient is satisfied with their life  Eyesight was rated as same  Hearing was rated as same  Patient feels that their emotional and mental health rating is same  Patients states they are sometimes angry  Patient states they are sometimes unusually tired/fatigued  Pain experienced in the last 7 days has been none  Patient states that she has experienced no weight loss or gain in last 6 months  Depression Screening:   PHQ-2 Score: 0      Fall Risk Screening: In the past year, patient has experienced: no history of falling in past year      Urinary Incontinence Screening:   Patient has not leaked urine accidently in the last six months  Home Safety:  Patient does not have trouble with stairs inside or outside of their home  Patient has working smoke alarms and has working carbon monoxide detector  Home safety hazards include: none  Nutrition:   Current diet is Regular  Medications:   Patient is currently taking over-the-counter supplements  OTC medications include: see medication list  Patient is able to manage medications  Activities of Daily Living (ADLs)/Instrumental Activities of Daily Living (IADLs):   Walk and transfer into and out of bed and chair?: Yes  Dress and groom yourself?: Yes    Bathe or shower yourself?: Yes    Feed yourself?  Yes  Do your laundry/housekeeping?: Yes  Manage your money, pay your bills and track your expenses?: Yes  Make your own meals?: Yes    Do your own shopping?: Yes    Previous Hospitalizations:   Any hospitalizations or ED visits within the last 12 months?: No      Advance Care Planning:   Living will: No    Durable POA for healthcare: No    Advanced directive: No      Cognitive Screening:   Provider or family/friend/caregiver concerned regarding cognition?: No    PREVENTIVE SCREENINGS      Cardiovascular Screening:    General: Screening Not Indicated and History Lipid Disorder      Diabetes Screening:     General: Screening Not Indicated and History Diabetes      Colorectal Cancer Screening:     General: Screening Not Indicated      Breast Cancer Screening:     General: Screening Not Indicated      Cervical Cancer Screening:    General: Screening Not Indicated      Osteoporosis Screening:    General: Risks and Benefits Discussed      Abdominal Aortic Aneurysm (AAA) Screening:        General: Screening Not Indicated      Lung Cancer Screening:     General: Screening Not Indicated      Hepatitis C Screening:    General: Risks and Benefits Discussed    Screening, Brief Intervention, and Referral to Treatment (SBIRT)    Screening  Typical number of drinks in a day: 0  Typical number of drinks in a week: 0  Interpretation: Low risk drinking behavior  Single Item Drug Screening:  How often have you used an illegal drug (including marijuana) or a prescription medication for non-medical reasons in the past year? never    Single Item Drug Screen Score: 0  Interpretation: Negative screen for possible drug use disorder    Brief Intervention  Alcohol & drug use screenings were reviewed  No concerns regarding substance use disorder identified         Dondra Councilman, MD

## 2022-05-31 NOTE — ASSESSMENT & PLAN NOTE
Well controlled  Continue same  Will continue to monitor A1c  Continue with low carb diet    Lab Results   Component Value Date    HGBA1C 6 1 (H) 05/25/2022

## 2022-05-31 NOTE — PROGRESS NOTES
Assessment/Plan:  Partial symptomatic epilepsy with complex partial seizures, not intractable, without status epilepticus (HCC)  Stable  Continue same  Continue follow-up with Neurology  Essential hypertension  Well controlled  Continue same  Will continue to monitor  Diabetes mellitus without complication ([facility] )  Well controlled  Continue same  Will continue to monitor A1c  Continue with low carb diet  Lab Results   Component Value Date    HGBA1C 6 1 (H) 05/25/2022       Stage 3a chronic kidney disease [facility])  Lab Results   Component Value Date    EGFR 71 05/25/2022    EGFR 57 11/09/2021    EGFR 67 05/19/2021    CREATININE 0 76 05/25/2022    CREATININE 0 92 11/09/2021    CREATININE 0 80 05/19/2021   Improved  Continue to encourage oral hydration  Will continue to monitor her GFR  Other hyperlipidemia  Stable  Continue on atorvastatin  Will continue to monitor fasting lipid profile  Healthcare maintenance  It was discussed about immunizations, diet, exercise and safety measures  Diagnoses and all orders for this visit:    Diabetes mellitus without complication ([facility] )  -     CBC and differential; Future  -     Comprehensive metabolic panel; Future  -     Hemoglobin A1C; Future  -     Lipid Panel with Direct LDL reflex; Future  -     TSH, 3rd generation with Free T4 reflex; Future    Partial symptomatic epilepsy with complex partial seizures, not intractable, without status epilepticus ([facility] [address] )    Stage 3a chronic kidney disease ([facility] )    Hypertension, unspecified type  Comments:  changing the valsartan to Avapro, will referred the patient to the cardiologist for further eval  Orders:  -     irbesartan (AVAPRO) 75 mg tablet; Take 1 tablet (75 mg total) by mouth daily    Essential hypertension    Other hyperlipidemia    Healthcare maintenance        There are no Patient Instructions on file for this visit  Return in about 6 months (around 11/30/2022)      Subjective:      Patient ID: Francios Chuck Celso Traylor is a 81 y o  female  Chief Complaint   Patient presents with   [facility] Wellness Visit       Here today for follow-up multiple medical problems  She has been taking her medications  Denies any side effects from her medications  She had blood work done recently showed A1c well controlled  All the rest of blood work came back normal   Her GFR improved  She continues to follow-up with neurologist for her seizure disorder  She continues on Keppra  The following portions of the patient's history were reviewed and updated as appropriate: allergies, current medications, past family history, past medical history, past social history, past surgical history and problem list     Review of Systems   Constitutional: Negative for chills and fever  HENT: Negative for trouble swallowing  Eyes: Negative for visual disturbance  Respiratory: Negative for cough and shortness of breath  Cardiovascular: Negative for chest pain, palpitations and leg swelling  Gastrointestinal: Negative for abdominal pain, constipation and diarrhea  Endocrine: Negative for cold intolerance and heat intolerance  Genitourinary: Negative for difficulty urinating and dysuria  Musculoskeletal: Negative for gait problem  Skin: Negative for rash  Neurological: Negative for dizziness, tremors, seizures and headaches  Hematological: Negative for adenopathy  Psychiatric/Behavioral: Negative for behavioral problems           Current Outpatient Medications   Medication Sig Dispense Refill    aspirin (ECOTRIN LOW STRENGTH) 81 mg EC tablet Take 1 tablet (81 mg total) by mouth daily  0    atorvastatin (LIPITOR) 10 mg tablet TAKE 1 TABLET(10 MG) BY MOUTH DAILY 90 tablet 1    glucose blood test strip Pt to test blood sugars twice daily 100 each 3    irbesartan (AVAPRO) 75 mg tablet Take 1 tablet (75 mg total) by mouth daily 90 tablet 1    Januvia 100 MG tablet TAKE ONE TABLET BY MOUTH DAILY 90 tablet 2    Lancet Devices (ACCU-CHEK SOFTCLIX) lancets Use as instructed 100 each 3    Lancets Super Thin 28G MISC Pt test bllod sugar twice daily 100 each 1    levETIRAcetam (KEPPRA) 750 mg tablet Take 1 tablet (750 mg total) by mouth 2 (two) times a day 180 tablet 3    Multiple Vitamin (multivitamin) tablet Take 1 tablet by mouth daily       No current facility-administered medications for this visit  Objective:    /70 (BP Location: Right arm, Patient Position: Sitting, Cuff Size: Adult)   Pulse 88   Temp 98 2 °F (36 8 °C) (Tympanic)   Resp 16   Ht 5' 3" (1 6 m)   Wt 75 kg (165 lb 6 oz)   SpO2 95%   BMI 29 29 kg/m²        Physical Exam  Vitals and nursing note reviewed  Constitutional:       Appearance: She is well-developed  HENT:      Head: Normocephalic and atraumatic  Eyes:      Pupils: Pupils are equal, round, and reactive to light  Cardiovascular:      Rate and Rhythm: Normal rate and regular rhythm  Heart sounds: Normal heart sounds  Pulmonary:      Effort: Pulmonary effort is normal       Breath sounds: Normal breath sounds  Abdominal:      General: Bowel sounds are normal       Palpations: Abdomen is soft  Musculoskeletal:         General: Normal range of motion  Cervical back: Normal range of motion and neck supple  Lymphadenopathy:      Cervical: No cervical adenopathy  Skin:     General: Skin is warm  Neurological:      Mental Status: She is alert and oriented to person, place, and time  Cranial Nerves: No cranial nerve deficit                  Polly Liu MD

## 2022-10-10 ENCOUNTER — TELEPHONE (OUTPATIENT)
Dept: FAMILY MEDICINE CLINIC | Facility: CLINIC | Age: 87
End: 2022-10-10

## 2022-10-10 NOTE — TELEPHONE ENCOUNTER
Patient left a message and said that she needs advice  On Saturday had a very bad headache, pain with eyes, ears, arms and legs  Got sick during the night and did not feel well at all  She tested positive for covid  She is very very weak and is very concerned  She said both she and  had covid before and never felt this bad    Is very anxious for someone to call her with advice

## 2022-10-10 NOTE — TELEPHONE ENCOUNTER
Pt tested positive on 10/8/22 started Saturday with headache,sorethroat chills, body aches and frequent urination  Then started with body aches and sore throat  Positive home test  Still having mild headaches and chills  She don't feel like she has a fever and is not short of breath  She also started with dry cough      Please advise

## 2022-10-10 NOTE — TELEPHONE ENCOUNTER
Pt will try oral hydration,tylenol and some otc cough syrup  She does not want to take the paxlovid   She will call if symptoms do no not improve

## 2022-10-10 NOTE — TELEPHONE ENCOUNTER
Continue with improving oral hydration and take Tylenol if needed  Also discussed with patient about COVID medication Paxlovid if she desires to take it

## 2022-11-09 ENCOUNTER — APPOINTMENT (OUTPATIENT)
Dept: LAB | Facility: IMAGING CENTER | Age: 87
End: 2022-11-09

## 2022-11-09 DIAGNOSIS — E11.9 DIABETES MELLITUS WITHOUT COMPLICATION (HCC): ICD-10-CM

## 2022-11-09 LAB
ALBUMIN SERPL BCP-MCNC: 3.5 G/DL (ref 3.5–5)
ALP SERPL-CCNC: 64 U/L (ref 46–116)
ALT SERPL W P-5'-P-CCNC: 27 U/L (ref 12–78)
ANION GAP SERPL CALCULATED.3IONS-SCNC: 3 MMOL/L (ref 4–13)
AST SERPL W P-5'-P-CCNC: 19 U/L (ref 5–45)
BASOPHILS # BLD AUTO: 0.05 THOUSANDS/ÂΜL (ref 0–0.1)
BASOPHILS NFR BLD AUTO: 1 % (ref 0–1)
BILIRUB SERPL-MCNC: 0.55 MG/DL (ref 0.2–1)
BUN SERPL-MCNC: 16 MG/DL (ref 5–25)
CALCIUM SERPL-MCNC: 9.5 MG/DL (ref 8.3–10.1)
CHLORIDE SERPL-SCNC: 107 MMOL/L (ref 96–108)
CHOLEST SERPL-MCNC: 115 MG/DL
CO2 SERPL-SCNC: 30 MMOL/L (ref 21–32)
CREAT SERPL-MCNC: 0.84 MG/DL (ref 0.6–1.3)
EOSINOPHIL # BLD AUTO: 0.19 THOUSAND/ÂΜL (ref 0–0.61)
EOSINOPHIL NFR BLD AUTO: 3 % (ref 0–6)
ERYTHROCYTE [DISTWIDTH] IN BLOOD BY AUTOMATED COUNT: 12.9 % (ref 11.6–15.1)
EST. AVERAGE GLUCOSE BLD GHB EST-MCNC: 140 MG/DL
GFR SERPL CREATININE-BSD FRML MDRD: 63 ML/MIN/1.73SQ M
GLUCOSE P FAST SERPL-MCNC: 137 MG/DL (ref 65–99)
HBA1C MFR BLD: 6.5 %
HCT VFR BLD AUTO: 40.6 % (ref 34.8–46.1)
HDLC SERPL-MCNC: 41 MG/DL
HGB BLD-MCNC: 12.5 G/DL (ref 11.5–15.4)
IMM GRANULOCYTES # BLD AUTO: 0.02 THOUSAND/UL (ref 0–0.2)
IMM GRANULOCYTES NFR BLD AUTO: 0 % (ref 0–2)
LDLC SERPL CALC-MCNC: 35 MG/DL (ref 0–100)
LYMPHOCYTES # BLD AUTO: 1.37 THOUSANDS/ÂΜL (ref 0.6–4.47)
LYMPHOCYTES NFR BLD AUTO: 23 % (ref 14–44)
MCH RBC QN AUTO: 28.2 PG (ref 26.8–34.3)
MCHC RBC AUTO-ENTMCNC: 30.8 G/DL (ref 31.4–37.4)
MCV RBC AUTO: 91 FL (ref 82–98)
MONOCYTES # BLD AUTO: 0.38 THOUSAND/ÂΜL (ref 0.17–1.22)
MONOCYTES NFR BLD AUTO: 6 % (ref 4–12)
NEUTROPHILS # BLD AUTO: 4.06 THOUSANDS/ÂΜL (ref 1.85–7.62)
NEUTS SEG NFR BLD AUTO: 67 % (ref 43–75)
NRBC BLD AUTO-RTO: 0 /100 WBCS
PLATELET # BLD AUTO: 252 THOUSANDS/UL (ref 149–390)
PMV BLD AUTO: 10.5 FL (ref 8.9–12.7)
POTASSIUM SERPL-SCNC: 4.7 MMOL/L (ref 3.5–5.3)
PROT SERPL-MCNC: 7.9 G/DL (ref 6.4–8.4)
RBC # BLD AUTO: 4.44 MILLION/UL (ref 3.81–5.12)
SODIUM SERPL-SCNC: 140 MMOL/L (ref 135–147)
TRIGL SERPL-MCNC: 196 MG/DL
TSH SERPL DL<=0.05 MIU/L-ACNC: 0.98 UIU/ML (ref 0.45–4.5)
WBC # BLD AUTO: 6.07 THOUSAND/UL (ref 4.31–10.16)

## 2022-11-14 ENCOUNTER — OFFICE VISIT (OUTPATIENT)
Dept: NEUROLOGY | Facility: CLINIC | Age: 87
End: 2022-11-14

## 2022-11-14 VITALS
WEIGHT: 167 LBS | HEART RATE: 89 BPM | BODY MASS INDEX: 29.59 KG/M2 | DIASTOLIC BLOOD PRESSURE: 60 MMHG | SYSTOLIC BLOOD PRESSURE: 125 MMHG | HEIGHT: 63 IN

## 2022-11-14 DIAGNOSIS — G40.209 PARTIAL SYMPTOMATIC EPILEPSY WITH COMPLEX PARTIAL SEIZURES, NOT INTRACTABLE, WITHOUT STATUS EPILEPTICUS (HCC): ICD-10-CM

## 2022-11-14 RX ORDER — LEVETIRACETAM 750 MG/1
750 TABLET ORAL 2 TIMES DAILY
Qty: 180 TABLET | Refills: 3 | Status: SHIPPED | OUTPATIENT
Start: 2022-11-14

## 2022-11-14 NOTE — PROGRESS NOTES
Review of Systems   Constitutional: Negative  Negative for appetite change and fever  HENT: Negative  Negative for hearing loss, tinnitus, trouble swallowing and voice change  Eyes: Negative  Negative for photophobia, pain and visual disturbance  Respiratory: Negative  Negative for shortness of breath  Cardiovascular: Negative  Negative for palpitations  Gastrointestinal: Negative  Negative for nausea and vomiting  Endocrine: Negative  Negative for cold intolerance  Genitourinary: Negative  Negative for dysuria, frequency and urgency  Musculoskeletal: Negative  Negative for gait problem, myalgias and neck pain  Skin: Negative  Negative for rash  Allergic/Immunologic: Negative  Neurological: Negative  Negative for dizziness, tremors, seizures, syncope, facial asymmetry, speech difficulty, weakness, light-headedness, numbness and headaches  Hematological: Negative  Does not bruise/bleed easily  Psychiatric/Behavioral: Negative  Negative for confusion, hallucinations and sleep disturbance  All other systems reviewed and are negative  Cephalexin Counseling: I counseled the patient regarding use of cephalexin as an antibiotic for prophylactic and/or therapeutic purposes. Cephalexin (commonly prescribed under brand name Keflex) is a cephalosporin antibiotic which is active against numerous classes of bacteria, including most skin bacteria. Side effects may include nausea, diarrhea, gastrointestinal upset, rash, hives, yeast infections, and in rare cases, hepatitis, kidney disease, seizures, fever, confusion, neurologic symptoms, and others. Patients with severe allergies to penicillin medications are cautioned that there is about a 10% incidence of cross-reactivity with cephalosporins. When possible, patients with penicillin allergies should use alternatives to cephalosporins for antibiotic therapy.

## 2022-11-14 NOTE — PATIENT INSTRUCTIONS
- continue with levetiracetam 750mg twice a day  - call the office if you have an episode of unresponsiveness, loss of consciousness, lapse in awareness  - if you have a seizure then immediately stop driving until reassessed  - follow-up in 1 year  - Seizure protocol  If she has a seizure that last less than 5 minutes then allow her to recover at home; just get her to the ground for safety  Call 911 if the following conditions apply  - unwitnessed onset of seizure and there is a potential head injury that needs to be evaluated  - patient stops breathing or turns blue during a seizure  - if the seizure is longer than 5 minutes  - if after the seizure ends and she does not show recovery over the course of 30 minutes    - if there are multiple seizures in 24 hours  - if the patient refused taking her medications for the past 24 hours  - if there is physical injury from the seizure

## 2022-11-14 NOTE — PROGRESS NOTES
[facility]  Patient's Name: Negrita Lopez   Patient's : 1935   Visit Type: follow-up  Referring MD / PCP:  Leila Grover MD    Assessment:  Ms Negrita Lopez is a 81 y o  woman with focal epilepsy, clinical seizures of "black outs" but more consistent with lapses in awareness and EEG study that found bilateral temporal sharp waves  Since being on levetiracetam, there have been no recurrent seizures or episode of altered awareness  She is tolerating levetiracetam without side effects  She seems to be doing well cognitively and no concerning behaviors  Plan:   - continue with levetiracetam 750mg twice a day  - call the office if you have an episode of unresponsiveness, loss of consciousness, lapse in awareness  - if you have a seizure then immediately stop driving until reassessed  - follow-up in 1 year  - Seizure protocol  If she has a seizure that last less than 5 minutes then allow her to recover at home; just get her to the ground for safety  Call 911 if the following conditions apply  - unwitnessed onset of seizure and there is a potential head injury that needs to be evaluated  - patient stops breathing or turns blue during a seizure  - if the seizure is longer than 5 minutes  - if after the seizure ends and she does not show recovery over the course of 30 minutes  - if there are multiple seizures in 24 hours  - if the patient refused taking her medications for the past 24 hours  - if there is physical injury from the seizure      Problem List Items Addressed This Visit        Nervous and Auditory    Partial symptomatic epilepsy with complex partial seizures, not intractable, without status epilepticus (HCC)    Relevant Medications    levETIRAcetam (KEPPRA) 750 mg tablet       Chief Complaint:   Chief Complaint   Patient presents with   • Seizures      HPI:    Negrita Lopez is a 81 y o  right handed female here for follow-up evaluation of focal onset epilepsy  Interval History 11/14/2022  She reports no recurrent episode of black outs  There have been no episode of unresponsiveness, altered awareness, or staring spell  She reports an episode at a dinner at least 3 summers ago, she looked at a menu, closed her eyes, her sister-in-law noticed that she looked strange, but this was a very quick instance  She is very independent and loves to drive  She is not frustrated with driving, no confusion, and does not get loss  Her  has not noticed any issue with driving  She does not need glasses to drive  AED/side effects/compliance:  Levetiracetam 750-750  There are no side effects    Event/Seizure semiology:  1  Suspected focal impair aware - staring, unresponsive  2  "black out" - twice it happened, when she turned and had a very brief (seconds) of "black out" resulting in her falling    Prior Epilepsy History:  Intake History 12/15/2020  She has a history of episodes of "blanking out", sometimes associated with a fall or one episode when she is talking and she cuts out  This started about 4 years ago  Her last episode was 2 years ago, when Levetiracetam was increased  She was previously seen by Dr Jorge Mayers; she reported her last seizure was on 10/14/2018  In January 2017, she was walking to her car, then she "blacked out" and fell to the ground  In October 2017, she was talking to her daughter, when she stopped talking, left hand fell to the side, and she was unresponsive and staring for several seconds  There were other episodes of when she suddenly stops talking in mid-sentence  She described one episode when she had an ill-defined strange sensation, with loss of awareness  She had an EEG study that showed bilateral independent alie-temporal sharp activity  She was started on levetiracetam 500-500      Patient's history:  She generally has no warning, she blacks out but she will know that it happened even if no one notices her having an episode  She recalls that her last episode was in 2018  Since being on her current dose of Levetiracetam there have been no recurrent episode of "black out"  She distinctly remembers one episode when she was walking down some stairs from a deck, when she turned and "blacked out" for a second and noticed that she had fallen  She had no other cause to fall from the steps  The patient denies any history of myoclonus, unexplained hyperkinetic behaviors, olfactory / gustatory hallucinations, epigastric rising events, dev vu events, visual hallucinations, unexplained nocturnal enuresis, or nocturnal tongue biting  Interval History 2021  -750  Ernie Cortes is doing fine  She denies recurrent episodes of "blanking out", altered awareness, behavioral arrest, loss of consciousness, or convulsion  Special Features  Status epilepticus: No  Self Injury Seizures: No  Precipitating Factors: None  Post-ictal state: none    Epilepsy Risk Factors:  Abnormal pregnancy: No  Abnormal birth/: No  Abnormal Development: No  Febrile seizures, simple: No  Febrile seizures, complex: No  CNS infection: No  Mental retardation: No  Cerebral palsy: No  Head injury (moderate/severe): No  CNS neoplasm: No  CNS malformation: No  Neurosurgical procedure: No  Stroke: No  CNS autoimmune disorder: No  Alcohol abuse: No  Drug abuse: No  Family history Sz/epilepsy: No    Prior AEDs:  medication Max dose Time used Reason to stop   levetiracetam              Prior workup:  x  [id number]  MRI brain wo  Mild chronic microvascular ischemic changes in subcortical and deep white matter of the frontal and parietal lobes bilaterally    MRA head - no significant stenosis of the cerebral arteries  VAS carotids - no significant stenosis    EEGs:  2018 - Dr Cuevas Notice  Bilateral, independent alie-temporal foci of sharp activity (T1-T3) and (T2-T4)    Labs:  Component      Latest Ref Rng & Units 2022   WBC 4 31 - 10 16 Thousand/uL  6 07   Red Blood Cell Count      3 81 - 5 12 Million/uL  4 44   Hemoglobin      11 5 - 15 4 g/dL  12 5   HCT      34 8 - 46 1 %  40 6   Platelet Count      996 - 390 Thousands/uL  252   Sodium      135 - 147 mmol/L  140   Potassium      3 5 - 5 3 mmol/L  4 7   Chloride      96 - 108 mmol/L  107   CO2      21 - 32 mmol/L  30   Anion Gap      4 - 13 mmol/L  3 (L)   BUN      5 - 25 mg/dL  16   Creatinine      0 60 - 1 30 mg/dL  0 84   GLUCOSE FASTING      65 - 99 mg/dL  137 (H)   Calcium      8 3 - 10 1 mg/dL  9 5   AST      5 - 45 U/L  19   ALT      12 - 78 U/L  27   Alkaline Phosphatase      46 - 116 U/L  64   Total Protein      6 4 - 8 4 g/dL  7 9   Albumin      3 5 - 5 0 g/dL  3 5   TOTAL BILIRUBIN      0 20 - 1 00 mg/dL  0 55   eGFR      ml/min/1 73sq m  63   LEVETIRACETA (KEPPRA)      10 0 - 40 0 ug/mL 20 8        General exam   /60 (BP Location: Right arm, Patient Position: Sitting, Cuff Size: Standard)   Pulse 89   Ht 5' 3" (1 6 m)   Wt 75 8 kg (167 lb)   BMI 29 58 kg/m²    Appearance: normally developed, appears well  Carotids: not assessed  Cardiovascular: regular rate and rhythm and no murmur is present  Pulmonary: clear to auscultation    HEENT: anicteric and neck is supple   Fundoscopy: not assessed    Mental status  Orientation: alert and oriented to name, place, November 14th, 2022  Fund of Knowledge: intact   Attention and Concentration: able to spell HOUSE forwards and backwards  Current and Remote Memory:intact  Language: spontaneous speech is normal and comprehension is intact    Cranial Nerves  CN 1: not tested  CN 2: Visual fields intact to confrontation and pupils equal round reactive to direct and consenual light   CN 3, 4, 6: EOMI, no nystagmus  CN 5:sensation intact to all distribution V1, V2, V3  CN 7:muscles of facial expression are symmetric  CN 8:not assessed  CN 9, 10:no dysarthria present  CN 11:symmetric SCM with head turns  CN 12:tongue is midline    Motor:  Bulk, Tone: normal bulk, normal tone  Pronation: not assessed  Strength: Patient has full strength symmetrically of shoulder abduction, biceps, triceps, finger flexion, finger abduction, hip flexion, knee flexion, knee extension, dorsiflexion  Abnormal movements: no abnormal movements are present    Sensory:  Lighttouch: intact in all limbs  Romberg:not assessed    Coordination:  FNF:FNF bilaterally intact  PHILLY:slow finger taps on the left and slow finger taps on the right  FFM:intact  Gait/Station:normal gait    Reflexes:  Not assessed    Past Medical/Surgical History:  Patient Active Problem List   Diagnosis   • Diabetes mellitus without complication ([facility] )   • Essential hypertension   • Other hyperlipidemia   • Healthcare maintenance   • Age-related nuclear cataract of both eyes   • Vitamin D insufficiency   • De Quervain's tenosynovitis   • BMI 29 0-29 9,adult   • Partial symptomatic epilepsy with complex partial seizures, not intractable, without status epilepticus ([facility] )   • Stage 3a chronic kidney disease (HCC)   • Tinnitus     Past Surgical History:   Procedure Laterality Date   • CATARACT EXTRACTION, BILATERAL Bilateral 10/2019   • KNEE SURGERY Bilateral      Past Psychiatric History:  Depression: No  Anxiety: No  Psychosis: No    Medications:    Current Outpatient Medications:   •  aspirin (ECOTRIN LOW STRENGTH) 81 mg EC tablet, Take 1 tablet (81 mg total) by mouth daily, Disp: , Rfl: 0  •  atorvastatin (LIPITOR) 10 mg tablet, TAKE 1 TABLET(10 MG) BY MOUTH DAILY, Disp: 90 tablet, Rfl: 1  •  glucose blood test strip, Pt to test blood sugars twice daily, Disp: 100 each, Rfl: 3  •  irbesartan (AVAPRO) 75 mg tablet, Take 1 tablet (75 mg total) by mouth daily, Disp: 90 tablet, Rfl: 1  •  Januvia 100 MG tablet, TAKE ONE TABLET BY MOUTH DAILY, Disp: 90 tablet, Rfl: 2  •  Lancet Devices (ACCU-CHEK SOFTCLIX) lancets, Use as instructed, Disp: 100 each, Rfl: 3  •  Lancets Super Thin 28G MISC, Pt test bllod sugar twice daily, Disp: 100 each, Rfl: 1  •  levETIRAcetam (KEPPRA) 750 mg tablet, Take 1 tablet (750 mg total) by mouth 2 (two) times a day, Disp: 180 tablet, Rfl: 3  •  Multiple Vitamin (multivitamin) tablet, Take 1 tablet by mouth daily, Disp: , Rfl:     Allergies: Allergies   Allergen Reactions   • No Active Allergies        Family history:  Family History   Problem Relation Age of Onset   • No Known Problems Family    • Diabetes Maternal Grandfather      There is no family history of seizure, epilepsy or developmental delay  Social History  Living situation:  Lives with   Work:  Completed high school, retired  at 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   reports that she has never smoked  She has never used smokeless tobacco  She reports current alcohol use  She reports that she does not use drugs  Review of Systems  A review of at least 12 organ/systems was obtained by the medical assistant and reviewed by me, including additional positives/negatives:  A review of at least 12 organ/systems was evaluated and there are no complaints  The total amount of time spent with the patient along with pre-chart and post-chart preparation was 20 minutes on the calendar day of the date of service  This included history taking, physical exam, review of ancillary testing, counseling provided to the patient regarding diagnosis, medications, treatment, and risk management, and other communication to the patient's providers and/or family    Start time: 10:53AM  End time: 11:13AM

## 2022-11-17 ENCOUNTER — RA CDI HCC (OUTPATIENT)
Dept: OTHER | Facility: HOSPITAL | Age: 87
End: 2022-11-17

## 2022-11-17 NOTE — PROGRESS NOTES
[facility]  coding opportunities          Chart Reviewed number of suggestions sent to Provider: 1     Patients Insurance     Medicare Insurance: Medicare        [id number]

## 2022-11-21 DIAGNOSIS — I10 HYPERTENSION, UNSPECIFIED TYPE: ICD-10-CM

## 2022-11-21 RX ORDER — IRBESARTAN 75 MG/1
TABLET ORAL
Qty: 90 TABLET | Refills: 1 | Status: SHIPPED | OUTPATIENT
Start: 2022-11-21

## 2022-11-21 NOTE — TELEPHONE ENCOUNTER
Pharmacy requesting refill on irbesartan  Last seen 5/31/22  Has appt 11/23/22  Medication sent to pharmacy

## 2022-11-23 ENCOUNTER — OFFICE VISIT (OUTPATIENT)
Dept: FAMILY MEDICINE CLINIC | Facility: CLINIC | Age: 87
End: 2022-11-23

## 2022-11-23 DIAGNOSIS — I10 ESSENTIAL HYPERTENSION: ICD-10-CM

## 2022-11-23 DIAGNOSIS — E11.22 TYPE 2 DIABETES MELLITUS WITH STAGE 3A CHRONIC KIDNEY DISEASE, WITHOUT LONG-TERM CURRENT USE OF INSULIN (HCC): Primary | ICD-10-CM

## 2022-11-23 DIAGNOSIS — N18.31 TYPE 2 DIABETES MELLITUS WITH STAGE 3A CHRONIC KIDNEY DISEASE, WITHOUT LONG-TERM CURRENT USE OF INSULIN (HCC): Primary | ICD-10-CM

## 2022-11-23 DIAGNOSIS — E11.9 TYPE 2 DIABETES MELLITUS WITHOUT COMPLICATION, UNSPECIFIED WHETHER LONG TERM INSULIN USE (HCC): ICD-10-CM

## 2022-11-23 DIAGNOSIS — E78.49 OTHER HYPERLIPIDEMIA: ICD-10-CM

## 2022-11-23 DIAGNOSIS — G40.209 PARTIAL SYMPTOMATIC EPILEPSY WITH COMPLEX PARTIAL SEIZURES, NOT INTRACTABLE, WITHOUT STATUS EPILEPTICUS (HCC): ICD-10-CM

## 2022-11-23 RX ORDER — SITAGLIPTIN 100 MG/1
100 TABLET, FILM COATED ORAL DAILY
Qty: 90 TABLET | Refills: 1 | Status: SHIPPED | OUTPATIENT
Start: 2022-11-23

## 2022-11-25 DIAGNOSIS — E78.5 HYPERLIPIDEMIA, UNSPECIFIED HYPERLIPIDEMIA TYPE: ICD-10-CM

## 2022-11-25 RX ORDER — ATORVASTATIN CALCIUM 10 MG/1
TABLET, FILM COATED ORAL
Qty: 90 TABLET | Refills: 1 | Status: SHIPPED | OUTPATIENT
Start: 2022-11-25

## 2022-11-27 VITALS
HEIGHT: 63 IN | DIASTOLIC BLOOD PRESSURE: 76 MMHG | RESPIRATION RATE: 16 BRPM | SYSTOLIC BLOOD PRESSURE: 136 MMHG | HEART RATE: 91 BPM | OXYGEN SATURATION: 97 % | WEIGHT: 166 LBS | TEMPERATURE: 98.2 F | BODY MASS INDEX: 29.41 KG/M2

## 2022-11-27 NOTE — ASSESSMENT & PLAN NOTE
Lab Results   Component Value Date    HGBA1C 6 5 (H) 11/09/2022     Well controlled  Continue on Januvia 100 mg daily  Discussed about low carb diet  Continue to monitor A1c

## 2022-11-27 NOTE — ASSESSMENT & PLAN NOTE
Component      Latest Ref Rng & Units 11/9/2021 5/25/2022 11/9/2022   Cholesterol      See Comment mg/dL 112 112 115   HDL      >=50 mg/dL 40 49 (L) 41 (L)   LDL Calculated      0 - 100 mg/dL 44 43 35   Triglycerides      See Comment mg/dL 138 99 196 (H)     Not well controlled  Discussed about low-fat diet  Continue on atorvastatin 10 mg daily  Continue to monitor fasting lipid profile

## 2022-11-27 NOTE — PROGRESS NOTES
Name: Carolynn Brennan      : 1935      MRN: [id number]  Encounter Provider: Matty Caldwell MD  Encounter Date: 2022   Encounter department: [address] [place] [address]  Type 2 diabetes mellitus with stage 3a chronic kidney disease, without long-term current use of insulin ([facility] )  Assessment & Plan:    Lab Results   Component Value Date    HGBA1C 6 5 (H) 2022     Well controlled  Continue on Januvia 100 mg daily  Discussed about low carb diet  Continue to monitor A1c  Orders:  -     Comprehensive metabolic panel; Future  -     CBC and differential; Future  -     Hemoglobin A1C; Future  -     Lipid Panel with Direct LDL reflex; Future  -     TSH, 3rd generation with Free T4 reflex; Future  -     Microalbumin / creatinine urine ratio    2  Type 2 diabetes mellitus without complication, unspecified whether long term insulin use (HCC)  -     Januvia 100 MG tablet; Take 1 tablet (100 mg total) by mouth daily    3  Essential hypertension  Assessment & Plan:    Well controlled  Blood pressure 136/76  Continue on Avapro 75 mg daily  Continue to monitor  4  Partial symptomatic epilepsy with complex partial seizures, not intractable, without status epilepticus ([facility] )  Assessment & Plan:    No recent seizure activities  Continue on Keppra  Continue to monitor  5  Other hyperlipidemia  Assessment & Plan:  Component      Latest Ref Rng & Units 2021   Cholesterol      See Comment mg/dL 112 112 115   HDL      >=50 mg/dL 40 49 (L) 41 (L)   LDL Calculated      0 - 100 mg/dL 44 43 35   Triglycerides      See Comment mg/dL 138 99 196 (H)     Not well controlled  Discussed about low-fat diet  Continue on atorvastatin 10 mg daily  Continue to monitor fasting lipid profile  Subjective       She is here today for follow-up multiple medical problems  She has been taking her medications    She denies any side effects from her medications  She had blood work done showed elevated triglyceride  Her fasting glucose was elevated but her A1c is well controlled at 6 5  Review of Systems   Constitutional: Negative for chills and fever  HENT: Negative for trouble swallowing  Eyes: Negative for visual disturbance  Respiratory: Negative for cough and shortness of breath  Cardiovascular: Negative for chest pain, palpitations and leg swelling  Gastrointestinal: Negative for abdominal pain, constipation and diarrhea  Endocrine: Negative for cold intolerance and heat intolerance  Genitourinary: Negative for difficulty urinating and dysuria  Musculoskeletal: Negative for gait problem  Skin: Negative for rash  Neurological: Negative for dizziness, tremors, seizures and headaches  Hematological: Negative for adenopathy  Psychiatric/Behavioral: Negative for behavioral problems  Past Medical History:   Diagnosis Date   • Diabetes mellitus ([facility] )    • Dyslipidemia    • Hemochromatosis    • Hypertension    • Seizures ([facility] )      Past Surgical History:   Procedure Laterality Date   • CATARACT EXTRACTION, BILATERAL Bilateral 10/2019   • KNEE SURGERY Bilateral      Family History   Problem Relation Age of Onset   • No Known Problems Family    • Diabetes Maternal Grandfather      Social History     Socioeconomic History   • Marital status: /Civil Union     Spouse name: None   • Number of children: None   • Years of education: None   • Highest education level: None   Occupational History   • None   Tobacco Use   • Smoking status: Never   • Smokeless tobacco: Never   Vaping Use   • Vaping Use: Never used   Substance and Sexual Activity   • Alcohol use:  Yes   • Drug use: No   • Sexual activity: None   Other Topics Concern   • None   Social History Narrative   • None     Social Determinants of Health     Financial Resource Strain: Not on file   Food Insecurity: Not on file   Transportation Needs: Not on file   Physical Activity: Not on file   Stress: Not on file   Social Connections: Not on file   Intimate Partner Violence: Not on file   Housing Stability: Not on file     Current Outpatient Medications on File Prior to Visit   Medication Sig   • aspirin (ECOTRIN LOW STRENGTH) 81 mg EC tablet Take 1 tablet (81 mg total) by mouth daily   • glucose blood test strip Pt to test blood sugars twice daily   • irbesartan (AVAPRO) 75 mg tablet TAKE 1 TABLET(75 MG) BY MOUTH DAILY   • Lancet Devices (ACCU-CHEK SOFTCLIX) lancets Use as instructed   • Lancets Super Thin 28G MISC Pt test bllod sugar twice daily   • levETIRAcetam (KEPPRA) 750 mg tablet Take 1 tablet (750 mg total) by mouth 2 (two) times a day   • Multiple Vitamin (multivitamin) tablet Take 1 tablet by mouth daily     Allergies   Allergen Reactions   • No Active Allergies      Immunization History   Administered Date(s) Administered   • COVID-19 MODERNA VACC 0 5 ML IM 01/12/2021, 02/09/2021   • Influenza, high dose seasonal 0 7 mL 10/08/2020, 11/23/2021   • Pneumococcal Conjugate 13-Valent 10/20/2020   • Pneumococcal Polysaccharide PPV23 11/23/2021   • Tdap 07/27/2018       Objective     /76 (BP Location: Left arm, Patient Position: Sitting, Cuff Size: Adult)   Pulse 91   Temp 98 2 °F (36 8 °C) (Tympanic)   Resp 16   Ht 5' 3" (1 6 m)   Wt 75 3 kg (166 lb)   SpO2 97%   BMI 29 41 kg/m²     Physical Exam  Vitals and nursing note reviewed  Constitutional:       Appearance: She is well-developed and well-nourished  HENT:      Head: Normocephalic and atraumatic  Eyes:      Extraocular Movements: EOM normal       Pupils: Pupils are equal, round, and reactive to light  Cardiovascular:      Rate and Rhythm: Normal rate and regular rhythm  Heart sounds: Normal heart sounds  Pulmonary:      Effort: Pulmonary effort is normal       Breath sounds: Normal breath sounds  Abdominal:      General: Bowel sounds are normal       Palpations: Abdomen is soft  Musculoskeletal:         General: No edema  Normal range of motion  Cervical back: Normal range of motion and neck supple  Lymphadenopathy:      Cervical: No cervical adenopathy  Skin:     General: Skin is warm  Neurological:      Mental Status: She is alert and oriented to person, place, and time  Cranial Nerves: No cranial nerve deficit     Psychiatric:         Mood and Affect: Mood and affect normal        Tyshawn Lofton MD

## 2023-02-15 DIAGNOSIS — E11.9 TYPE 2 DIABETES MELLITUS WITHOUT COMPLICATION, UNSPECIFIED WHETHER LONG TERM INSULIN USE (HCC): ICD-10-CM

## 2023-02-15 RX ORDER — SITAGLIPTIN 100 MG/1
TABLET, FILM COATED ORAL
Qty: 90 TABLET | Refills: 1 | Status: SHIPPED | OUTPATIENT
Start: 2023-02-15

## 2023-05-30 ENCOUNTER — RA CDI HCC (OUTPATIENT)
Dept: OTHER | Facility: HOSPITAL | Age: 88
End: 2023-05-30

## 2023-05-30 ENCOUNTER — APPOINTMENT (OUTPATIENT)
Dept: LAB | Facility: IMAGING CENTER | Age: 88
End: 2023-05-30

## 2023-05-30 DIAGNOSIS — N18.31 TYPE 2 DIABETES MELLITUS WITH STAGE 3A CHRONIC KIDNEY DISEASE, WITHOUT LONG-TERM CURRENT USE OF INSULIN (HCC): ICD-10-CM

## 2023-05-30 DIAGNOSIS — E11.22 TYPE 2 DIABETES MELLITUS WITH STAGE 3A CHRONIC KIDNEY DISEASE, WITHOUT LONG-TERM CURRENT USE OF INSULIN (HCC): ICD-10-CM

## 2023-05-30 LAB
ALBUMIN SERPL BCP-MCNC: 3.7 G/DL (ref 3.5–5)
ALP SERPL-CCNC: 61 U/L (ref 46–116)
ALT SERPL W P-5'-P-CCNC: 27 U/L (ref 12–78)
ANION GAP SERPL CALCULATED.3IONS-SCNC: -1 MMOL/L (ref 4–13)
AST SERPL W P-5'-P-CCNC: 22 U/L (ref 5–45)
BASOPHILS # BLD AUTO: 0.05 THOUSANDS/ÂΜL (ref 0–0.1)
BASOPHILS NFR BLD AUTO: 1 % (ref 0–1)
BILIRUB SERPL-MCNC: 0.53 MG/DL (ref 0.2–1)
BUN SERPL-MCNC: 17 MG/DL (ref 5–25)
CALCIUM SERPL-MCNC: 9.3 MG/DL (ref 8.3–10.1)
CHLORIDE SERPL-SCNC: 105 MMOL/L (ref 96–108)
CHOLEST SERPL-MCNC: 110 MG/DL
CO2 SERPL-SCNC: 31 MMOL/L (ref 21–32)
CREAT SERPL-MCNC: 0.85 MG/DL (ref 0.6–1.3)
CREAT UR-MCNC: 147 MG/DL
EOSINOPHIL # BLD AUTO: 0.13 THOUSAND/ÂΜL (ref 0–0.61)
EOSINOPHIL NFR BLD AUTO: 2 % (ref 0–6)
ERYTHROCYTE [DISTWIDTH] IN BLOOD BY AUTOMATED COUNT: 13 % (ref 11.6–15.1)
EST. AVERAGE GLUCOSE BLD GHB EST-MCNC: 134 MG/DL
GFR SERPL CREATININE-BSD FRML MDRD: 61 ML/MIN/1.73SQ M
GLUCOSE P FAST SERPL-MCNC: 137 MG/DL (ref 65–99)
HBA1C MFR BLD: 6.3 %
HCT VFR BLD AUTO: 43 % (ref 34.8–46.1)
HDLC SERPL-MCNC: 51 MG/DL
HGB BLD-MCNC: 13.3 G/DL (ref 11.5–15.4)
IMM GRANULOCYTES # BLD AUTO: 0.01 THOUSAND/UL (ref 0–0.2)
IMM GRANULOCYTES NFR BLD AUTO: 0 % (ref 0–2)
LDLC SERPL CALC-MCNC: 40 MG/DL (ref 0–100)
LYMPHOCYTES # BLD AUTO: 1.35 THOUSANDS/ÂΜL (ref 0.6–4.47)
LYMPHOCYTES NFR BLD AUTO: 22 % (ref 14–44)
MCH RBC QN AUTO: 28.2 PG (ref 26.8–34.3)
MCHC RBC AUTO-ENTMCNC: 30.9 G/DL (ref 31.4–37.4)
MCV RBC AUTO: 91 FL (ref 82–98)
MICROALBUMIN UR-MCNC: 13.7 MG/L (ref 0–20)
MICROALBUMIN/CREAT 24H UR: 9 MG/G CREATININE (ref 0–30)
MONOCYTES # BLD AUTO: 0.51 THOUSAND/ÂΜL (ref 0.17–1.22)
MONOCYTES NFR BLD AUTO: 8 % (ref 4–12)
NEUTROPHILS # BLD AUTO: 4.03 THOUSANDS/ÂΜL (ref 1.85–7.62)
NEUTS SEG NFR BLD AUTO: 67 % (ref 43–75)
NRBC BLD AUTO-RTO: 0 /100 WBCS
PLATELET # BLD AUTO: 261 THOUSANDS/UL (ref 149–390)
PMV BLD AUTO: 10.5 FL (ref 8.9–12.7)
POTASSIUM SERPL-SCNC: 4.5 MMOL/L (ref 3.5–5.3)
PROT SERPL-MCNC: 7.7 G/DL (ref 6.4–8.4)
RBC # BLD AUTO: 4.71 MILLION/UL (ref 3.81–5.12)
SODIUM SERPL-SCNC: 135 MMOL/L (ref 135–147)
TRIGL SERPL-MCNC: 93 MG/DL
TSH SERPL DL<=0.05 MIU/L-ACNC: 0.93 UIU/ML (ref 0.45–4.5)
WBC # BLD AUTO: 6.08 THOUSAND/UL (ref 4.31–10.16)

## 2023-05-30 NOTE — PROGRESS NOTES
[facility]  coding opportunities       Chart reviewed, no opportunity found: CHART REVIEWED, NO OPPORTUNITY FOUND        Patients Insurance     Medicare Insurance: Medicare

## 2023-06-05 ENCOUNTER — OFFICE VISIT (OUTPATIENT)
Dept: FAMILY MEDICINE CLINIC | Facility: CLINIC | Age: 88
End: 2023-06-05
Payer: MEDICARE

## 2023-06-05 VITALS
HEART RATE: 79 BPM | RESPIRATION RATE: 16 BRPM | OXYGEN SATURATION: 97 % | BODY MASS INDEX: 31.1 KG/M2 | SYSTOLIC BLOOD PRESSURE: 120 MMHG | WEIGHT: 169 LBS | DIASTOLIC BLOOD PRESSURE: 72 MMHG | TEMPERATURE: 97.3 F | HEIGHT: 62 IN

## 2023-06-05 DIAGNOSIS — Z00.00 MEDICARE ANNUAL WELLNESS VISIT, SUBSEQUENT: ICD-10-CM

## 2023-06-05 DIAGNOSIS — I10 ESSENTIAL HYPERTENSION: ICD-10-CM

## 2023-06-05 DIAGNOSIS — N18.31 STAGE 3A CHRONIC KIDNEY DISEASE (HCC): ICD-10-CM

## 2023-06-05 DIAGNOSIS — E11.9 TYPE 2 DIABETES MELLITUS WITHOUT COMPLICATION, UNSPECIFIED WHETHER LONG TERM INSULIN USE (HCC): Primary | ICD-10-CM

## 2023-06-05 DIAGNOSIS — I10 HYPERTENSION, UNSPECIFIED TYPE: ICD-10-CM

## 2023-06-05 DIAGNOSIS — N18.31 TYPE 2 DIABETES MELLITUS WITH STAGE 3A CHRONIC KIDNEY DISEASE, WITHOUT LONG-TERM CURRENT USE OF INSULIN (HCC): ICD-10-CM

## 2023-06-05 DIAGNOSIS — E78.49 OTHER HYPERLIPIDEMIA: ICD-10-CM

## 2023-06-05 DIAGNOSIS — E11.22 TYPE 2 DIABETES MELLITUS WITH STAGE 3A CHRONIC KIDNEY DISEASE, WITHOUT LONG-TERM CURRENT USE OF INSULIN (HCC): ICD-10-CM

## 2023-06-05 DIAGNOSIS — G40.209 PARTIAL SYMPTOMATIC EPILEPSY WITH COMPLEX PARTIAL SEIZURES, NOT INTRACTABLE, WITHOUT STATUS EPILEPTICUS (HCC): ICD-10-CM

## 2023-06-05 DIAGNOSIS — E78.5 HYPERLIPIDEMIA, UNSPECIFIED HYPERLIPIDEMIA TYPE: ICD-10-CM

## 2023-06-05 PROCEDURE — G0439 PPPS, SUBSEQ VISIT: HCPCS | Performed by: FAMILY MEDICINE

## 2023-06-05 PROCEDURE — 99214 OFFICE O/P EST MOD 30 MIN: CPT | Performed by: FAMILY MEDICINE

## 2023-06-05 PROCEDURE — G0444 DEPRESSION SCREEN ANNUAL: HCPCS | Performed by: FAMILY MEDICINE

## 2023-06-05 RX ORDER — IRBESARTAN 75 MG/1
75 TABLET ORAL DAILY
Qty: 90 TABLET | Refills: 1 | Status: SHIPPED | OUTPATIENT
Start: 2023-06-05

## 2023-06-05 RX ORDER — SITAGLIPTIN 100 MG/1
100 TABLET, FILM COATED ORAL DAILY
Qty: 90 TABLET | Refills: 1 | Status: SHIPPED | OUTPATIENT
Start: 2023-06-05

## 2023-06-05 RX ORDER — ATORVASTATIN CALCIUM 10 MG/1
10 TABLET, FILM COATED ORAL DAILY
Qty: 90 TABLET | Refills: 1 | Status: SHIPPED | OUTPATIENT
Start: 2023-06-05

## 2023-06-05 NOTE — ASSESSMENT & PLAN NOTE
Lab Results   Component Value Date    CREATININE 0 85 05/30/2023    CREATININE 0 84 11/09/2022    CREATININE 0 76 05/25/2022    EGFR 61 05/30/2023    EGFR 63 11/09/2022    EGFR 71 05/25/2022   GFR has been stable  Continue to encourage oral hydration  Continue to monitor GFR

## 2023-06-05 NOTE — PROGRESS NOTES
Assessment and Plan:     Problem List Items Addressed This Visit        Endocrine    Type 2 diabetes mellitus with stage 3a chronic kidney disease, without long-term current use of insulin ([facility] )     Well-controlled  Continue on Januvia  Continue to monitor A1c  Lab Results   Component Value Date    HGBA1C 6 3 (H) 05/30/2023            Relevant Medications    Januvia 100 MG tablet       Cardiovascular and Mediastinum    Essential hypertension     Blood pressure is well controlled  Continue on Avapro  We will continue to monitor  Relevant Medications    irbesartan (AVAPRO) 75 mg tablet       Nervous and Auditory    Partial symptomatic epilepsy with complex partial seizures, not intractable, without status epilepticus (HCC)     Stable  Asymptomatic  Continue on Keppra and continue to follow-up with neurologist             Genitourinary    Stage 3a chronic kidney disease ([facility] )     Lab Results   Component Value Date    CREATININE 0 85 05/30/2023    CREATININE 0 84 11/09/2022    CREATININE 0 76 05/25/2022    EGFR 61 05/30/2023    EGFR 63 11/09/2022    EGFR 71 05/25/2022   GFR has been stable  Continue to encourage oral hydration  Continue to monitor GFR  Other    Other hyperlipidemia     Well-controlled continue on Lipitor 10 mg daily  We will continue to monitor fasting lipid profile  Relevant Medications    atorvastatin (LIPITOR) 10 mg tablet    Medicare annual wellness visit, subsequent     It was discussed about immunizations, diet, exercise and safety measures          Other Visit Diagnoses     Type 2 diabetes mellitus without complication, unspecified whether long term insulin use (HCC)    -  Primary    Relevant Medications    Januvia 100 MG tablet    Other Relevant Orders    Hemoglobin A1C    Comprehensive metabolic panel    CBC and differential    Lipid Panel with Direct LDL reflex    TSH, 3rd generation with Free T4 reflex    Hyperlipidemia, unspecified hyperlipidemia type Relevant Medications    atorvastatin (LIPITOR) 10 mg tablet    Hypertension, unspecified type        changing the valsartan to Avapro, will referred the patient to the cardiologist for further eval    Relevant Medications    irbesartan (AVAPRO) 75 mg tablet    BMI 30 0-30 9,adult               Preventive health issues were discussed with patient, and age appropriate screening tests were ordered as noted in patient's After Visit Summary  Personalized health advice and appropriate referrals for health education or preventive services given if needed, as noted in patient's After Visit Summary  History of Present Illness:     Patient presents for a Medicare Wellness Visit    She is here today for follow-up multiple medical problems  She has been taking her medications  Her fasting glucose was elevated but her A1c improved to 6 2  She continues to follow-up with neurologist and continue on [address]  Patient Care Team:  Jennifer Pina MD as PCP - General (Family Medicine)     Review of Systems:     Review of Systems   Constitutional: Negative for chills and fever  HENT: Negative for trouble swallowing  Eyes: Negative for visual disturbance  Respiratory: Negative for cough and shortness of breath  Cardiovascular: Negative for chest pain, palpitations and leg swelling  Gastrointestinal: Negative for abdominal pain, constipation and diarrhea  Endocrine: Negative for cold intolerance and heat intolerance  Genitourinary: Negative for difficulty urinating and dysuria  Musculoskeletal: Negative for gait problem  Skin: Negative for rash  Neurological: Negative for dizziness, tremors, seizures and headaches  Hematological: Negative for adenopathy  Psychiatric/Behavioral: Negative for behavioral problems          Problem List:     Patient Active Problem List   Diagnosis   • Diabetes mellitus without complication [facility])   • Essential hypertension   • Other hyperlipidemia   • Medicare annual wellness visit, subsequent   • Age-related nuclear cataract of both eyes   • Vitamin D insufficiency   • De Quervain's tenosynovitis   • BMI 29 0-29 9,adult   • Partial symptomatic epilepsy with complex partial seizures, not intractable, without status epilepticus ([facility] )   • Stage 3a chronic kidney disease (HCC)   • Tinnitus   • Type 2 diabetes mellitus with stage 3a chronic kidney disease, without long-term current use of insulin (HCC)      Past Medical and Surgical History:     Past Medical History:   Diagnosis Date   • Diabetes mellitus ([facility] )    • Dyslipidemia    • Hemochromatosis    • Hypertension    • Seizures ([facility] )      Past Surgical History:   Procedure Laterality Date   • CATARACT EXTRACTION, BILATERAL Bilateral 10/2019   • KNEE SURGERY Bilateral       Family History:     Family History   Problem Relation Age of Onset   • No Known Problems Family    • Diabetes Maternal Grandfather       Social History:     Social History     Socioeconomic History   • Marital status: /Civil Union     Spouse name: None   • Number of children: None   • Years of education: None   • Highest education level: None   Occupational History   • None   Tobacco Use   • Smoking status: Never   • Smokeless tobacco: Never   Vaping Use   • Vaping Use: Never used   Substance and Sexual Activity   • Alcohol use:  Yes   • Drug use: No   • Sexual activity: None   Other Topics Concern   • None   Social History Narrative   • None     Social Determinants of Health     Financial Resource Strain: Not on file   Food Insecurity: Not on file   Transportation Needs: Not on file   Physical Activity: Not on file   Stress: Not on file   Social Connections: Not on file   Intimate Partner Violence: Not on file   Housing Stability: Not on file      Medications and Allergies:     Current Outpatient Medications   Medication Sig Dispense Refill   • aspirin (ECOTRIN LOW STRENGTH) 81 mg EC tablet Take 1 tablet (81 mg total) by mouth daily  0   • atorvastatin (LIPITOR) 10 mg tablet Take 1 tablet (10 mg total) by mouth daily 90 tablet 1   • glucose blood test strip Pt to test blood sugars twice daily 100 each 3   • irbesartan (AVAPRO) 75 mg tablet Take 1 tablet (75 mg total) by mouth daily 90 tablet 1   • Januvia 100 MG tablet Take 1 tablet (100 mg total) by mouth daily 90 tablet 1   • Lancet Devices (ACCU-CHEK SOFTCLIX) lancets Use as instructed 100 each 3   • Lancets Super Thin 28G MISC Pt test bllod sugar twice daily 100 each 1   • levETIRAcetam (KEPPRA) 750 mg tablet Take 1 tablet (750 mg total) by mouth 2 (two) times a day 180 tablet 3   • Multiple Vitamin (multivitamin) tablet Take 1 tablet by mouth daily       No current facility-administered medications for this visit  Allergies   Allergen Reactions   • No Active Allergies       Immunizations:     Immunization History   Administered Date(s) Administered   • COVID-19 MODERNA VACC 0 5 ML IM 01/12/2021, 02/09/2021   • Influenza, high dose seasonal 0 7 mL 10/08/2020, 11/23/2021   • Pneumococcal Conjugate 13-Valent 10/20/2020   • Pneumococcal Polysaccharide PPV23 11/23/2021   • Tdap 07/27/2018      Health Maintenance: There are no preventive care reminders to display for this patient  Topic Date Due   • COVID-19 Vaccine (3 - Moderna series) 04/06/2021   • Influenza Vaccine (Season Ended) 09/01/2023      Medicare Screening Tests and Risk Assessments:     Neel Cason is here for her Subsequent Wellness visit  Health Risk Assessment:   Patient rates overall health as good  Patient feels that their physical health rating is same  Patient is satisfied with their life  Eyesight was rated as same  Hearing was rated as same  Patient feels that their emotional and mental health rating is same  Patients states they are never, rarely angry  Patient states they are never, rarely unusually tired/fatigued  Pain experienced in the last 7 days has been none   Patient states that she has experienced no weight loss or gain in last 6 months  Depression Screening:   PHQ-2 Score: 0      Fall Risk Screening: In the past year, patient has experienced: no history of falling in past year      Urinary Incontinence Screening:   Patient has not leaked urine accidently in the last six months  Home Safety:  Patient does not have trouble with stairs inside or outside of their home  Patient has working smoke alarms and has no working carbon monoxide detector  Home safety hazards include: none  Nutrition:   Current diet is Regular  Medications:   Patient is currently taking over-the-counter supplements  OTC medications include: see medication list  Patient is able to manage medications  Activities of Daily Living (ADLs)/Instrumental Activities of Daily Living (IADLs):   Walk and transfer into and out of bed and chair?: Yes  Dress and groom yourself?: Yes    Bathe or shower yourself?: Yes    Feed yourself? Yes  Do your laundry/housekeeping?: Yes  Manage your money, pay your bills and track your expenses?: Yes  Make your own meals?: Yes    Do your own shopping?: Yes    Previous Hospitalizations:   Any hospitalizations or ED visits within the last 12 months?: No      Advance Care Planning:   Living will: Yes    Durable POA for healthcare:  Yes    Advanced directive: Yes      Cognitive Screening:   Provider or family/friend/caregiver concerned regarding cognition?: No    PREVENTIVE SCREENINGS      Cardiovascular Screening:    General: Screening Not Indicated and History Lipid Disorder      Diabetes Screening:     General: Screening Not Indicated and History Diabetes      Colorectal Cancer Screening:     General: Screening Not Indicated      Breast Cancer Screening:     General: Screening Not Indicated      Cervical Cancer Screening:    General: Screening Not Indicated      Osteoporosis Screening:    General: Risks and Benefits Discussed      Abdominal Aortic Aneurysm (AAA) Screening:        General: Screening Not Indicated      Lung "Cancer Screening:     General: Screening Not Indicated      Hepatitis C Screening:    General: Risks and Benefits Discussed    Screening, Brief Intervention, and Referral to Treatment (SBIRT)    Screening  Typical number of drinks in a day: 0  Typical number of drinks in a week: 0  Interpretation: Low risk drinking behavior  Single Item Drug Screening:  How often have you used an illegal drug (including marijuana) or a prescription medication for non-medical reasons in the past year? never    Single Item Drug Screen Score: 0  Interpretation: Negative screen for possible drug use disorder    Brief Intervention  Alcohol & drug use screenings were reviewed  No concerns regarding substance use disorder identified  Annual Depression Screening  Time spent screening and evaluating the patient for depression during today's encounter was 6 minutes  Other Counseling Topics:   Regular weightbearing exercise and calcium and vitamin D intake  No results found  Physical Exam:     /72 (BP Location: Left arm, Patient Position: Sitting, Cuff Size: Adult)   Pulse 79   Temp (!) 97 3 °F (36 3 °C) (Tympanic)   Resp 16   Ht 5' 2\" (1 575 m)   Wt 76 7 kg (169 lb)   SpO2 97%   BMI 30 91 kg/m²     Physical Exam  Vitals and nursing note reviewed  Constitutional:       General: She is not in acute distress  Appearance: She is well-developed  HENT:      Head: Normocephalic and atraumatic  Eyes:      Conjunctiva/sclera: Conjunctivae normal    Cardiovascular:      Rate and Rhythm: Normal rate and regular rhythm  Heart sounds: No murmur heard  Pulmonary:      Effort: Pulmonary effort is normal  No respiratory distress  Breath sounds: Normal breath sounds  Abdominal:      Palpations: Abdomen is soft  Tenderness: There is no abdominal tenderness  Musculoskeletal:         General: No swelling  Cervical back: Neck supple  Skin:     General: Skin is warm and dry        Capillary Refill: " Capillary refill takes less than 2 seconds  Neurological:      Mental Status: She is alert  Psychiatric:         Mood and Affect: Mood normal           Sahil Garcia MD BMI Counseling: Body mass index is 30 91 kg/m²  The BMI is above normal  Nutrition recommendations include reducing portion sizes, decreasing overall calorie intake and 3-5 servings of fruits/vegetables daily

## 2023-06-05 NOTE — ASSESSMENT & PLAN NOTE
Well-controlled  Continue on Januvia  Continue to monitor A1c    Lab Results   Component Value Date    HGBA1C 6 3 (H) 05/30/2023

## 2023-08-01 DIAGNOSIS — G40.209 PARTIAL SYMPTOMATIC EPILEPSY WITH COMPLEX PARTIAL SEIZURES, NOT INTRACTABLE, WITHOUT STATUS EPILEPTICUS (HCC): ICD-10-CM

## 2023-08-01 RX ORDER — LEVETIRACETAM 750 MG/1
750 TABLET ORAL 2 TIMES DAILY
Qty: 180 TABLET | Refills: 3 | Status: SHIPPED | OUTPATIENT
Start: 2023-08-01

## 2023-08-01 NOTE — TELEPHONE ENCOUNTER
Received VM transcription:    My name is Ady Pete. I'm a patient of Dr. Nory Rowell and I was going to reorder my Levetiracetam this morning, which I waited 45 minutes on the line, only to be told that it was cancelled. Now what am I to do? I need my prescription or if its changed, why has it been changed? And why was I not informed? So please call me so that I could do something about my drugs. Thank you very much. I appreciate your time. Ady Pete. [birth year]. My prescription is Levetiracetam. It's 750 mg twice a day. And I usually get a refill for a 180 capsules at [facility] in [facility]. Thank you.  -------------------------------------------------------    Chart reviewed and active script on file indicates pt should have enough until 11/14/2023. Called pharmacy. Pharmacist informs that this medications profile was closed out, possibly due to pt may have tried to transfer meds to different pharmacy. In any case, pharmacist says they will need new script. Spoke with pt and informed her that our office did not cancel script. Informed her of what pharmacy said and she says she never tried to transfer scripts. Advised pt that her script has not been changed or cancelled and that we will be sending new script to her pharmacy. Pt says she has just enough for 3 days. Pt requesting call back when script is sent. Dr. Nory Rowell - Rx entered. Please review and sign if in agreement.

## 2023-08-02 ENCOUNTER — TELEPHONE (OUTPATIENT)
Dept: FAMILY MEDICINE CLINIC | Facility: CLINIC | Age: 88
End: 2023-08-02

## 2023-08-02 NOTE — TELEPHONE ENCOUNTER
Received fax from [facility] [email or web address] pt as[facility]. Called the program , they were just confirming pt address and if Nyla Bowens is still a active medication. They will ship out medication today.

## 2023-08-02 NOTE — TELEPHONE ENCOUNTER
Left detailed message (ok per consent on file) informing pt that script was sent to pharmacy. Nothing further at this time.

## 2023-08-04 PROBLEM — Z00.00 MEDICARE ANNUAL WELLNESS VISIT, SUBSEQUENT: Status: RESOLVED | Noted: 2019-05-09 | Resolved: 2023-08-04

## 2023-10-27 DIAGNOSIS — G40.209 PARTIAL SYMPTOMATIC EPILEPSY WITH COMPLEX PARTIAL SEIZURES, NOT INTRACTABLE, WITHOUT STATUS EPILEPTICUS (HCC): ICD-10-CM

## 2023-10-27 RX ORDER — LEVETIRACETAM 750 MG/1
750 TABLET ORAL 2 TIMES DAILY
Qty: 180 TABLET | Refills: 0 | Status: SHIPPED | OUTPATIENT
Start: 2023-10-27

## 2023-10-27 NOTE — TELEPHONE ENCOUNTER
[facility] 10/26/23 at 11:58 am:    My name is Elizabeth Barnett and my date of birth is [birth year]. And my phone number is [phone]. Now I have an appointment with Dr. Mago Olson on November 14th. But my prescription will run out this weekend. Now I already called the one of your units, but no one returned my call. So this is my 2nd phone call. I need to refill my levetiracetam 750 mg. My pharmacy is [facility]. Now are you gonna call me back and let me know that my prescription will be refilled? I hope so because I'm getting nervous. __________________________________________________________________________________________________  Routed refill request to provider. Also routed to clinical staff to follow up with pt once the script is sent to the pharmacy.

## 2023-11-02 DIAGNOSIS — E11.9 TYPE 2 DIABETES MELLITUS WITHOUT COMPLICATION, UNSPECIFIED WHETHER LONG TERM INSULIN USE (HCC): ICD-10-CM

## 2023-11-03 ENCOUNTER — TELEPHONE (OUTPATIENT)
Dept: NEUROLOGY | Facility: CLINIC | Age: 88
End: 2023-11-03

## 2023-11-03 RX ORDER — SITAGLIPTIN 100 MG/1
100 TABLET, FILM COATED ORAL DAILY
Qty: 90 TABLET | Refills: 1 | Status: SHIPPED | OUTPATIENT
Start: 2023-11-03

## 2023-11-03 NOTE — TELEPHONE ENCOUNTER
Called the patient no answer lmom . To call us back to make an appt with different provider or different location 11/14/23. Hospitalized or in ED since last visit: No    Medication Changes: No    Upcoming/Recent Procedures: Yes     Colonoscopy scheduled 1/24/23, no plan for warfarin yet.    Referral Expiration Date Approaching: No    BP Readings from Last 1 Encounters:   09/27/22 106/60

## 2023-11-08 ENCOUNTER — APPOINTMENT (OUTPATIENT)
Dept: LAB | Facility: IMAGING CENTER | Age: 88
End: 2023-11-08
Payer: MEDICARE

## 2023-11-08 DIAGNOSIS — E11.9 TYPE 2 DIABETES MELLITUS WITHOUT COMPLICATION, UNSPECIFIED WHETHER LONG TERM INSULIN USE (HCC): ICD-10-CM

## 2023-11-08 LAB
ALBUMIN SERPL BCP-MCNC: 4.3 G/DL (ref 3.5–5)
ALP SERPL-CCNC: 53 U/L (ref 34–104)
ALT SERPL W P-5'-P-CCNC: 16 U/L (ref 7–52)
ANION GAP SERPL CALCULATED.3IONS-SCNC: 6 MMOL/L
AST SERPL W P-5'-P-CCNC: 17 U/L (ref 13–39)
BASOPHILS # BLD AUTO: 0.05 THOUSANDS/ÂΜL (ref 0–0.1)
BASOPHILS NFR BLD AUTO: 1 % (ref 0–1)
BILIRUB SERPL-MCNC: 0.46 MG/DL (ref 0.2–1)
BUN SERPL-MCNC: 15 MG/DL (ref 5–25)
CALCIUM SERPL-MCNC: 9.4 MG/DL (ref 8.4–10.2)
CHLORIDE SERPL-SCNC: 104 MMOL/L (ref 96–108)
CHOLEST SERPL-MCNC: 135 MG/DL
CO2 SERPL-SCNC: 32 MMOL/L (ref 21–32)
CREAT SERPL-MCNC: 0.81 MG/DL (ref 0.6–1.3)
EOSINOPHIL # BLD AUTO: 0.16 THOUSAND/ÂΜL (ref 0–0.61)
EOSINOPHIL NFR BLD AUTO: 2 % (ref 0–6)
ERYTHROCYTE [DISTWIDTH] IN BLOOD BY AUTOMATED COUNT: 13.1 % (ref 11.6–15.1)
EST. AVERAGE GLUCOSE BLD GHB EST-MCNC: 151 MG/DL
GFR SERPL CREATININE-BSD FRML MDRD: 65 ML/MIN/1.73SQ M
GLUCOSE P FAST SERPL-MCNC: 132 MG/DL (ref 65–99)
HBA1C MFR BLD: 6.9 %
HCT VFR BLD AUTO: 42.2 % (ref 34.8–46.1)
HDLC SERPL-MCNC: 41 MG/DL
HGB BLD-MCNC: 13 G/DL (ref 11.5–15.4)
IMM GRANULOCYTES # BLD AUTO: 0.01 THOUSAND/UL (ref 0–0.2)
IMM GRANULOCYTES NFR BLD AUTO: 0 % (ref 0–2)
LDLC SERPL CALC-MCNC: 48 MG/DL (ref 0–100)
LYMPHOCYTES # BLD AUTO: 1.56 THOUSANDS/ÂΜL (ref 0.6–4.47)
LYMPHOCYTES NFR BLD AUTO: 23 % (ref 14–44)
MCH RBC QN AUTO: 28.7 PG (ref 26.8–34.3)
MCHC RBC AUTO-ENTMCNC: 30.8 G/DL (ref 31.4–37.4)
MCV RBC AUTO: 93 FL (ref 82–98)
MONOCYTES # BLD AUTO: 0.48 THOUSAND/ÂΜL (ref 0.17–1.22)
MONOCYTES NFR BLD AUTO: 7 % (ref 4–12)
NEUTROPHILS # BLD AUTO: 4.63 THOUSANDS/ÂΜL (ref 1.85–7.62)
NEUTS SEG NFR BLD AUTO: 67 % (ref 43–75)
NRBC BLD AUTO-RTO: 0 /100 WBCS
PLATELET # BLD AUTO: 230 THOUSANDS/UL (ref 149–390)
PMV BLD AUTO: 11 FL (ref 8.9–12.7)
POTASSIUM SERPL-SCNC: 4.8 MMOL/L (ref 3.5–5.3)
PROT SERPL-MCNC: 7.3 G/DL (ref 6.4–8.4)
RBC # BLD AUTO: 4.53 MILLION/UL (ref 3.81–5.12)
SODIUM SERPL-SCNC: 142 MMOL/L (ref 135–147)
TRIGL SERPL-MCNC: 230 MG/DL
TSH SERPL DL<=0.05 MIU/L-ACNC: 1.2 UIU/ML (ref 0.45–4.5)
WBC # BLD AUTO: 6.89 THOUSAND/UL (ref 4.31–10.16)

## 2023-11-08 PROCEDURE — 83036 HEMOGLOBIN GLYCOSYLATED A1C: CPT

## 2023-11-08 PROCEDURE — 85025 COMPLETE CBC W/AUTO DIFF WBC: CPT

## 2023-11-08 PROCEDURE — 80053 COMPREHEN METABOLIC PANEL: CPT

## 2023-11-08 PROCEDURE — 84443 ASSAY THYROID STIM HORMONE: CPT

## 2023-11-08 PROCEDURE — 80061 LIPID PANEL: CPT

## 2023-11-08 PROCEDURE — 36415 COLL VENOUS BLD VENIPUNCTURE: CPT

## 2023-11-14 ENCOUNTER — OFFICE VISIT (OUTPATIENT)
Dept: NEUROLOGY | Facility: CLINIC | Age: 88
End: 2023-11-14
Payer: MEDICARE

## 2023-11-14 VITALS
WEIGHT: 172 LBS | DIASTOLIC BLOOD PRESSURE: 55 MMHG | BODY MASS INDEX: 31.46 KG/M2 | HEART RATE: 81 BPM | SYSTOLIC BLOOD PRESSURE: 128 MMHG

## 2023-11-14 DIAGNOSIS — G40.209 PARTIAL SYMPTOMATIC EPILEPSY WITH COMPLEX PARTIAL SEIZURES, NOT INTRACTABLE, WITHOUT STATUS EPILEPTICUS (HCC): Primary | ICD-10-CM

## 2023-11-14 DIAGNOSIS — R53.83 FATIGUE: ICD-10-CM

## 2023-11-14 DIAGNOSIS — E55.9 VITAMIN D DEFICIENCY: ICD-10-CM

## 2023-11-14 DIAGNOSIS — Z79.899 OTHER LONG TERM (CURRENT) DRUG THERAPY: ICD-10-CM

## 2023-11-14 PROBLEM — Z86.39 HISTORY OF HEMOCHROMATOSIS: Status: ACTIVE | Noted: 2023-11-14

## 2023-11-14 PROCEDURE — 99214 OFFICE O/P EST MOD 30 MIN: CPT | Performed by: NURSE PRACTITIONER

## 2023-11-14 RX ORDER — LEVETIRACETAM 750 MG/1
750 TABLET ORAL 2 TIMES DAILY
Qty: 180 TABLET | Refills: 3 | Status: SHIPPED | OUTPATIENT
Start: 2023-11-14

## 2023-11-14 NOTE — ASSESSMENT & PLAN NOTE
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 There are no clear side effects of this medication although she does report fatigue. She has been on this medication for quite some time so I doubt that levetiracetam is contributing but will check levetiracetam level due to CKD. Her CKD may warrant a lower dose of levetiracetam depending on her levetiracetam level. Will check B12 and vitamin D due to fatigue. There is a history of vitamin D deficiency. She does not eat much meat which can contribute to vitamin B12 deficiency as well as long term statin use (atorvastatin). She will continue current dose of levetiracetam 750 mg BID. With breakthrough seizures or concerns she will call the office. Follow up in 6 months or sooner if needed.

## 2023-11-14 NOTE — PATIENT INSTRUCTIONS
- Continue current dose of levetiracetam (keppra) 750 mg twice per day  - Call the office with any issues or concerns  - Have B12 and vitamin D checked after you see your PCP - they may want to order other labs too   - Follow up in 6 months

## 2023-11-27 ENCOUNTER — RA CDI HCC (OUTPATIENT)
Dept: OTHER | Facility: HOSPITAL | Age: 88
End: 2023-11-27

## 2023-11-27 NOTE — PROGRESS NOTES
[facility] coding opportunities       Chart reviewed, no opportunity found: CHART REVIEWED, NO OPPORTUNITY FOUND        Patients Insurance     Medicare Insurance: Medicare

## 2023-12-04 ENCOUNTER — OFFICE VISIT (OUTPATIENT)
Dept: FAMILY MEDICINE CLINIC | Facility: CLINIC | Age: 88
End: 2023-12-04
Payer: MEDICARE

## 2023-12-04 VITALS
OXYGEN SATURATION: 97 % | DIASTOLIC BLOOD PRESSURE: 74 MMHG | TEMPERATURE: 98.1 F | SYSTOLIC BLOOD PRESSURE: 138 MMHG | WEIGHT: 170 LBS | BODY MASS INDEX: 31.28 KG/M2 | RESPIRATION RATE: 16 BRPM | HEART RATE: 83 BPM | HEIGHT: 62 IN

## 2023-12-04 DIAGNOSIS — G40.209 PARTIAL SYMPTOMATIC EPILEPSY WITH COMPLEX PARTIAL SEIZURES, NOT INTRACTABLE, WITHOUT STATUS EPILEPTICUS (HCC): ICD-10-CM

## 2023-12-04 DIAGNOSIS — E11.9 TYPE 2 DIABETES MELLITUS WITHOUT COMPLICATION, UNSPECIFIED WHETHER LONG TERM INSULIN USE (HCC): ICD-10-CM

## 2023-12-04 DIAGNOSIS — E78.49 OTHER HYPERLIPIDEMIA: ICD-10-CM

## 2023-12-04 DIAGNOSIS — I10 ESSENTIAL HYPERTENSION: ICD-10-CM

## 2023-12-04 DIAGNOSIS — N18.31 TYPE 2 DIABETES MELLITUS WITH STAGE 3A CHRONIC KIDNEY DISEASE, WITHOUT LONG-TERM CURRENT USE OF INSULIN (HCC): Primary | ICD-10-CM

## 2023-12-04 DIAGNOSIS — E11.22 TYPE 2 DIABETES MELLITUS WITH STAGE 3A CHRONIC KIDNEY DISEASE, WITHOUT LONG-TERM CURRENT USE OF INSULIN (HCC): Primary | ICD-10-CM

## 2023-12-04 PROCEDURE — 99214 OFFICE O/P EST MOD 30 MIN: CPT | Performed by: FAMILY MEDICINE

## 2023-12-04 NOTE — PROGRESS NOTES
Name: Matthew Arias      : 1935      MRN: [id number]  Encounter Provider: Amanda Coello MD  Encounter Date: 2023   Encounter department: [facility]     1. Type 2 diabetes mellitus with stage 3a chronic kidney disease, without long-term current use of insulin (HCC)  Assessment & Plan:  A1c is well-controlled at 6.9. Continue on Januvia 100 mg daily. Continue to monitor. Lab Results   Component Value Date    HGBA1C 6.9 (H) 2023         2. Essential hypertension  Assessment & Plan:  Well-controlled on Avapro 75 mg daily. Continue same. Will continue to monitor. 3. Type 2 diabetes mellitus without complication, unspecified whether long term insulin use (HCC)  -     Albumin / creatinine urine ratio; Future; Expected date: 2023  -     Comprehensive metabolic panel; Future; Expected date: 2023  -     Hemoglobin A1C; Future; Expected date: 2023  -     CBC and differential; Future; Expected date: 2023  -     Lipid Panel with Direct LDL reflex; Future; Expected date: 2023  -     TSH, 3rd generation with Free T4 reflex; Future; Expected date: 2023    4. Partial symptomatic epilepsy with complex partial seizures, not intractable, without status epilepticus ([address])  Assessment & Plan:   Stable on Keppra. Continue same and continue to follow-up with neurology. 5. Other hyperlipidemia  Assessment & Plan:  Well-controlled on Lipitor 10 mg daily. Continue same. We will continue to monitor. Subjective     She is here today for follow-up multiple medical problems. She has been taking her medications. She denies any side effect of her medications. She was seen recently by her neurologist.  Vitamin D, vitamin B12 and Keppra level was ordered. Review of Systems   Constitutional:  Positive for fatigue. Negative for chills and fever. HENT:  Negative for trouble swallowing.     Eyes:  Negative for visual disturbance. Respiratory:  Negative for cough and shortness of breath. Cardiovascular:  Negative for chest pain, palpitations and leg swelling. Gastrointestinal:  Negative for abdominal pain, constipation and diarrhea. Endocrine: Negative for cold intolerance and heat intolerance. Genitourinary:  Negative for difficulty urinating and dysuria. Musculoskeletal:  Negative for gait problem. Skin:  Negative for rash. Neurological:  Negative for dizziness, tremors, seizures and headaches. Hematological:  Negative for adenopathy. Psychiatric/Behavioral:  Negative for behavioral problems. Past Medical History:   Diagnosis Date   • Diabetes mellitus ([address])    • Dyslipidemia    • Hemochromatosis    • Hypertension    • Seizures ([address])      Past Surgical History:   Procedure Laterality Date   • CATARACT EXTRACTION, BILATERAL Bilateral 10/2019   • KNEE SURGERY Bilateral      Family History   Problem Relation Age of Onset   • No Known Problems Family    • Diabetes Maternal Grandfather      Social History     Socioeconomic History   • Marital status: /Civil Union     Spouse name: None   • Number of children: None   • Years of education: None   • Highest education level: None   Occupational History   • None   Tobacco Use   • Smoking status: Never   • Smokeless tobacco: Never   Vaping Use   • Vaping Use: Never used   Substance and Sexual Activity   • Alcohol use:  Yes   • Drug use: No   • Sexual activity: None   Other Topics Concern   • None   Social History Narrative   • None     Social Determinants of Health     Financial Resource Strain: Not on file   Food Insecurity: Not on file   Transportation Needs: Not on file   Physical Activity: Not on file   Stress: Not on file   Social Connections: Not on file   Intimate Partner Violence: Not on file   Housing Stability: Not on file     Current Outpatient Medications on File Prior to Visit   Medication Sig   • aspirin (ECOTRIN LOW STRENGTH) 81 mg EC tablet Take 1 tablet (81 mg total) by mouth daily   • atorvastatin (LIPITOR) 10 mg tablet Take 1 tablet (10 mg total) by mouth daily   • Cholecalciferol (Vitamin D-3) 125 MCG (5000 UT) TABS Take by mouth   • glucose blood test strip Pt to test blood sugars twice daily   • irbesartan (AVAPRO) 75 mg tablet Take 1 tablet (75 mg total) by mouth daily   • Januvia 100 MG tablet TAKE ONE TABLET BY MOUTH EVERY DAY   • Lancet Devices (ACCU-CHEK SOFTCLIX) lancets Use as instructed   • Lancets Super Thin 28G MISC Pt test bllod sugar twice daily   • levETIRAcetam (KEPPRA) 750 mg tablet Take 1 tablet (750 mg total) by mouth 2 (two) times a day   • Multiple Vitamin (multivitamin) tablet Take 1 tablet by mouth daily     Allergies   Allergen Reactions   • No Active Allergies      Immunization History   Administered Date(s) Administered   • COVID-19 MODERNA VACC 0.5 ML IM 01/12/2021, 02/09/2021   • Influenza, high dose seasonal 0.7 mL 10/08/2020, 11/23/2021   • Pneumococcal Conjugate 13-Valent 10/20/2020   • Pneumococcal Polysaccharide PPV23 11/23/2021   • Tdap 07/27/2018       Objective     /74 (BP Location: Left arm, Patient Position: Sitting, Cuff Size: Large)   Pulse 83   Temp 98.1 °F (36.7 °C) (Tympanic)   Resp 16   Ht 5' 2" (1.575 m)   Wt 77.1 kg (170 lb)   SpO2 97%   BMI 31.09 kg/m²     Physical Exam  Vitals and nursing note reviewed. Constitutional:       Appearance: She is well-developed. HENT:      Head: Normocephalic and atraumatic. Eyes:      Pupils: Pupils are equal, round, and reactive to light. Cardiovascular:      Rate and Rhythm: Normal rate and regular rhythm. Heart sounds: Normal heart sounds. Pulmonary:      Effort: Pulmonary effort is normal.      Breath sounds: Normal breath sounds. Abdominal:      General: Bowel sounds are normal.      Palpations: Abdomen is soft. Musculoskeletal:      Cervical back: Normal range of motion and neck supple.    Lymphadenopathy:      Cervical: No cervical adenopathy. Skin:     General: Skin is warm. Neurological:      Mental Status: She is alert and oriented to person, place, and time.        Zainab Salguero MD

## 2023-12-04 NOTE — ASSESSMENT & PLAN NOTE
A1c is well-controlled at 6.9. Continue on Januvia 100 mg daily. Continue to monitor.   Lab Results   Component Value Date    HGBA1C 6.9 (H) 11/08/2023

## 2023-12-08 ENCOUNTER — TELEPHONE (OUTPATIENT)
Dept: FAMILY MEDICINE CLINIC | Facility: CLINIC | Age: 88
End: 2023-12-08

## 2023-12-08 ENCOUNTER — TELEMEDICINE (OUTPATIENT)
Dept: FAMILY MEDICINE CLINIC | Facility: CLINIC | Age: 88
End: 2023-12-08
Payer: MEDICARE

## 2023-12-08 VITALS — BODY MASS INDEX: 31.28 KG/M2 | HEIGHT: 62 IN | WEIGHT: 170 LBS

## 2023-12-08 DIAGNOSIS — U07.1 COVID-19: Primary | ICD-10-CM

## 2023-12-08 DIAGNOSIS — U07.1 COVID-19: ICD-10-CM

## 2023-12-08 PROCEDURE — 99213 OFFICE O/P EST LOW 20 MIN: CPT | Performed by: NURSE PRACTITIONER

## 2023-12-08 RX ORDER — FLUTICASONE PROPIONATE 50 MCG
2 SPRAY, SUSPENSION (ML) NASAL DAILY
Qty: 48 G | Refills: 2 | Status: SHIPPED | OUTPATIENT
Start: 2023-12-08

## 2023-12-08 RX ORDER — BENZONATATE 200 MG/1
200 CAPSULE ORAL 3 TIMES DAILY PRN
Qty: 20 CAPSULE | Refills: 0 | Status: SHIPPED | OUTPATIENT
Start: 2023-12-08

## 2023-12-08 RX ORDER — FLUTICASONE PROPIONATE 50 MCG
2 SPRAY, SUSPENSION (ML) NASAL DAILY
Qty: 16 G | Refills: 0 | Status: SHIPPED | OUTPATIENT
Start: 2023-12-08 | End: 2023-12-08

## 2023-12-08 NOTE — TELEPHONE ENCOUNTER
[phone]. I'm calling on behalf of my mother, Danish Nagel who's a patient again, and I need a phone call back. She tested positive for a COVID illness and I think we need to bring her in to you. That's what the COVID test is saying. Bring them in for primary care. She is coughing, headache, achy and not a whole lot of energy, frankly, and she's laying in her bed. She's 81, [id number], so we're concerned for her. So if someone can please call me back today, not 24 hours from now, so that we can move forward with what we're supposed to do for her. Again, it's Lara Yung  She was just in your office this week. I think it was Tuesday and I'm looking for a return call for the necessary care that she needs to receive. Thank you [phone].

## 2023-12-08 NOTE — PROGRESS NOTES
COVID-19 Outpatient Progress Note    Assessment/Plan:    Problem List Items Addressed This Visit     COVID-19 - Primary     - This is day 3 of symptoms. Tested positive 12/8.   - Discussed supportive care and management. - Sent prescriptions for tessalon perles and Flonase.   - Reviewed isolation and masking guidelines. - Contact office if symptoms worsen. Relevant Medications    benzonatate (TESSALON) 200 MG capsule    fluticasone (FLONASE) 50 mcg/act nasal spray      Disposition:     Patient with asymptomatic/mild COVID-19: They were recommended to isolate for at least 5 days (day 0 is the day symptoms appeared or the date the specimen was collected for the positive test for people who are asymptomatic). If they are asymptomatic or symptoms are improving with no fevers in the past 24 hours, isolation may be ended followed by 5 days of wearing a high quality mask when around others to minimize risk of infecting others. They should wear a mask through day 10 and a test-based strategy may be used to remove a mask sooner. I have spent a total time of 15 minutes on the day of the encounter for this patient including discussing prognosis, risks and benefits of treatment options, instructions for management, impressions, counseling/coordination of care, documenting in the medical record, reviewing/ordering tests, medicine, procedures and obtaining or reviewing history.        Encounter provider: VIRGIE Coates     Provider located at: [address] [place] [address] PRIMARY CARE  [address] [place] [id number]  [phone]     Recent Visits  Date Type Provider Dept   12/04/23 Office Visit Gale Simmons  [facility]   Showing recent visits within past 7 days and meeting all other requirements  Today's Visits  Date Type Provider Dept   12/08/23 Telephone Cyril Calle [facility] Primary Care   12/08/23 Telemedicine VIRGIE Coates [facility] Primary Care   Showing today's visits and meeting all other requirements  Future Appointments  No visits were found meeting these conditions. Showing future appointments within next 150 days and meeting all other requirements     This virtual check-in was done via [employer] and patient was informed that this is a secure, HIPAA-compliant platform. She agrees to proceed. Patient agrees to participate in a virtual check in via telephone or video visit instead of presenting to the office to address urgent/immediate medical needs. Patient is aware this is a billable service. She acknowledged consent and understanding of privacy and security of the video platform. The patient has agreed to participate and understands they can discontinue the visit at any time. After connecting through [facility], the patient was identified by name and date of birth. Temo Coe was informed that this was a telemedicine visit and that the exam was being conducted confidentially over secure lines. My office door was closed. No one else was in the room. Temo Coe acknowledged consent and understanding of privacy and security of the telemedicine visit. I informed the patient that I have reviewed her record in Epic and presented the opportunity for her to ask any questions regarding the visit today. The patient agreed to participate. Verification of patient location:  Patient is located in the following state in which I hold an active license: [place]    Subjective:   Temo Coe is a 81 y.o. female who has been screened for COVID-19. Symptom change since last report: unchanged. Patient is currently asymptomatic. Patient's symptoms include fatigue, nasal congestion, cough and headache. Patient denies fever, chills, malaise, rhinorrhea, sore throat, anosmia, loss of taste, shortness of breath, chest tightness, abdominal pain, nausea, vomiting, diarrhea and myalgias.      - Date of symptom onset: 12/6/2023  - Date of positive COVID-19 test: 12/8/2023. Type of test: Home antigen. COVID-19 vaccination status: Fully vaccinated (primary series)    Isha Driver has been staying home and has isolated themselves in her home. She is taking care to not share personal items and is cleaning all surfaces that are touched often, like counters, tabletops, and doorknobs using household cleaning sprays or wipes. She is wearing a mask when she leaves her room. Lab Results   Component Value Date    [address] [facility] [address] Not Detected 12/20/2021       Review of Systems   Constitutional:  Positive for fatigue. Negative for chills and fever. HENT:  Positive for congestion. Negative for rhinorrhea and sore throat. Respiratory:  Positive for cough. Negative for chest tightness and shortness of breath. Cardiovascular:  Negative for chest pain and palpitations. Gastrointestinal:  Negative for abdominal pain, diarrhea, nausea and vomiting. Musculoskeletal:  Negative for myalgias. Neurological:  Positive for headaches.      Current Outpatient Medications on File Prior to Visit   Medication Sig   • aspirin (ECOTRIN LOW STRENGTH) 81 mg EC tablet Take 1 tablet (81 mg total) by mouth daily   • atorvastatin (LIPITOR) 10 mg tablet Take 1 tablet (10 mg total) by mouth daily   • Cholecalciferol (Vitamin D-3) 125 MCG (5000 UT) TABS Take by mouth   • glucose blood test strip Pt to test blood sugars twice daily   • irbesartan (AVAPRO) 75 mg tablet Take 1 tablet (75 mg total) by mouth daily   • Januvia 100 MG tablet TAKE ONE TABLET BY MOUTH EVERY DAY   • Lancet Devices (ACCU-CHEK SOFTCLIX) lancets Use as instructed   • Lancets Super Thin 28G MISC Pt test bllod sugar twice daily   • levETIRAcetam (KEPPRA) 750 mg tablet Take 1 tablet (750 mg total) by mouth 2 (two) times a day   • Multiple Vitamin (multivitamin) tablet Take 1 tablet by mouth daily       Objective:    Ht 5' 2" (1.575 m)   Wt 77.1 kg (170 lb)   BMI 31.09 kg/m²        Physical Exam  Vitals and nursing note reviewed. Constitutional:       General: She is not in acute distress. Appearance: Normal appearance. HENT:      Head: Normocephalic and atraumatic. Right Ear: External ear normal.      Left Ear: External ear normal.   Eyes:      Conjunctiva/sclera: Conjunctivae normal.   Pulmonary:      Effort: Pulmonary effort is normal.   Neurological:      Mental Status: She is alert and oriented to person, place, and time.    Psychiatric:         Mood and Affect: Mood normal.         Behavior: Behavior normal.       Sven Aly, HANSANP

## 2023-12-08 NOTE — ASSESSMENT & PLAN NOTE
- This is day 3 of symptoms. Tested positive 12/8.   - Discussed supportive care and management. - Sent prescriptions for tessalon perles and Flonase.   - Reviewed isolation and masking guidelines. - Contact office if symptoms worsen.

## 2023-12-26 DIAGNOSIS — E78.5 HYPERLIPIDEMIA, UNSPECIFIED HYPERLIPIDEMIA TYPE: ICD-10-CM

## 2023-12-26 RX ORDER — ATORVASTATIN CALCIUM 10 MG/1
10 TABLET, FILM COATED ORAL DAILY
Qty: 90 TABLET | Refills: 1 | Status: SHIPPED | OUTPATIENT
Start: 2023-12-26

## 2024-01-26 ENCOUNTER — TELEPHONE (OUTPATIENT)
Dept: FAMILY MEDICINE CLINIC | Facility: CLINIC | Age: 89
End: 2024-01-26

## 2024-02-08 DIAGNOSIS — I10 HYPERTENSION, UNSPECIFIED TYPE: ICD-10-CM

## 2024-02-08 RX ORDER — IRBESARTAN 75 MG/1
75 TABLET ORAL DAILY
Qty: 90 TABLET | Refills: 1 | Status: SHIPPED | OUTPATIENT
Start: 2024-02-08

## 2024-02-09 ENCOUNTER — TELEPHONE (OUTPATIENT)
Dept: NEUROLOGY | Facility: CLINIC | Age: 89
End: 2024-02-09

## 2024-02-09 NOTE — TELEPHONE ENCOUNTER
received vm from 2/8 at 10:06am-Good morning. My name is Sherley cabrera. I was, I am a patient of Dr. Juarez who referred me then to doctor or to the assistant jasper contreras now. And now I'm out of medication. I'm on irbesartan and levetiracetam 750mg's.  I don't have it. I called [facility]. And they have it. But I need the, the physicians. Ok. I have been picking up this medication for several years. And I don't understand why I cannot pick it up today. So I would like to speak to someone in authority who could pass on the instructions. So I can receive my medication, which I need as of sunday, i'll be out of it on Saturday. Thank you. Sherley rick for [birth year]. And my doctor was dr. Juarez and his assistant javier contreras.  Thank you very much. Oh and my phone number is [phone]. Now do I have to stand here in wait all day for an answer or can I go about my duties? Nothing simple today is it, but please return my call. Thank you.  --------------------------------------------  Irbesartan prescribed by pcp and they send script to pharm on 2/8    DONG script sent to pharm on 11/14/23 for 90 day supply and 3 refills    Called pharm, they will get LEVE ready for pt  Also asked about irbesartan-they will also get this ready for pt    Left message for pt advising that pharm will be getting both medications ready for her

## 2024-05-21 ENCOUNTER — OFFICE VISIT (OUTPATIENT)
Dept: NEUROLOGY | Facility: CLINIC | Age: 89
End: 2024-05-21
Payer: MEDICARE

## 2024-05-21 VITALS
WEIGHT: 167.6 LBS | DIASTOLIC BLOOD PRESSURE: 60 MMHG | SYSTOLIC BLOOD PRESSURE: 125 MMHG | HEIGHT: 62 IN | TEMPERATURE: 98.1 F | BODY MASS INDEX: 30.84 KG/M2 | HEART RATE: 98 BPM

## 2024-05-21 DIAGNOSIS — G40.209 PARTIAL SYMPTOMATIC EPILEPSY WITH COMPLEX PARTIAL SEIZURES, NOT INTRACTABLE, WITHOUT STATUS EPILEPTICUS (HCC): ICD-10-CM

## 2024-05-21 DIAGNOSIS — R53.83 FATIGUE: Primary | ICD-10-CM

## 2024-05-21 DIAGNOSIS — E55.9 VITAMIN D INSUFFICIENCY: ICD-10-CM

## 2024-05-21 PROCEDURE — 99214 OFFICE O/P EST MOD 30 MIN: CPT | Performed by: NURSE PRACTITIONER

## 2024-05-21 RX ORDER — LEVETIRACETAM 750 MG/1
750 TABLET ORAL 2 TIMES DAILY
Qty: 180 TABLET | Refills: 3 | Status: SHIPPED | OUTPATIENT
Start: 2024-05-21

## 2024-05-21 NOTE — PROGRESS NOTES
"Patient ID: Sherley Fields is a 89 y.o. female with  focal epilepsy, clinical seizures of \"black outs\" but more consistent with lapses in awareness and EEG study that found bilateral temporal sharp waves , who is returning to Neurology office for follow up of her seizures.        Assessment/Plan:    Partial symptomatic epilepsy with complex partial seizures, not intractable, without status epilepticus (HCC)  Patient with focal epilepsy who continues to be seizure free on levetiracetam monotherapy of 750 mg BID. There are no clear side effects of this medication although she does report fatigue. Checking levetiracetam level.     Will check B12 and vitamin D due to fatigue as well. There is a history of vitamin D deficiency. She does not eat much meat which can contribute to vitamin B12 deficiency as well as long term statin use (atorvastatin).     She will continue current dose of levetiracetam 750 mg BID. With breakthrough seizures or concerns she will call the office. Follow up in 6 months or sooner if needed.     Fatigue  Check B12 and vitamin D.    Vitamin D insufficiency  Check vitamin D.    She will Return in about 6 months (around 11/21/2024) for Follow up with Dr Juarez in [place].      Subjective:  Sherley Fields is a 89 y.o. female with  focal epilepsy, clinical seizures of \"black outs\" but more consistent with lapses in awareness and EEG study that found bilateral temporal sharp waves , who is returning to Neurology office for follow up of her seizures. She was last seen in the office on 11/14/2023. At that time, seizure free on levetiracetam 750 mg BID without side effects or concerns other than fatigue. Recommended checking B12 and vitamin D as well as levetiracetam level. She was to follow up in 6 months.    Levetiracetam level, B12, and vitamin D not completed after her last visit.      Since their last visit, she continues to be seizure free. Currently on levetiracetam 750 mg BID.  She reports that she " "does continue to have some difficulty with fatigue. No better or worse since her last visit. Agreeable to having blood work checked as ordered at her prior visit. PCP also ordered blood work at last visit that is due to be completed for her upcoming follow up. Encouraged having this all done at the same time.     No new medical issues since her last visit. Denies other issues or concerns. Overall she has been doing well.     Current seizure medications:  - levetiracetam 750 mg BID  Other medications as per Epic.    Event/Seizure semiology:  Suspected focal impair aware - staring, unresponsive  \"black out\" - twice it happened, when she turned and had a very brief (seconds) of \"black out\" resulting in her falling     Special Features  Status epilepticus: No  Self Injury Seizures: No  Precipitating Factors: None  Post-ictal state: none     Epilepsy Risk Factors:  Abnormal pregnancy: No  Abnormal birth/: No  Abnormal Development: No  Febrile seizures, simple: No  Febrile seizures, complex: No  CNS infection: No  Mental retardation: No  Cerebral palsy: No  Head injury (moderate/severe): No  CNS neoplasm: No  CNS malformation: No  Neurosurgical procedure: No  Stroke: No  CNS autoimmune disorder: No  Alcohol abuse: No  Drug abuse: No  Family history Sz/epilepsy: No     Prior AEDs:  medication Max dose Time used Reason to stop   levetiracetam                      Prior workup:  [id number]  MRI brain wo  Mild chronic microvascular ischemic changes in subcortical and deep white matter of the frontal and parietal lobes bilaterally.  MRA head - no significant stenosis of the cerebral arteries  VAS carotids - no significant stenosis     EEGs:  2018 - Dr. Garcia  Bilateral, independent alie-temporal foci of sharp activity (T1-T3) and (T2-T4)     Past Psychiatric History:  Depression: No  Anxiety: No  Psychosis: No    Her history was also obtained from her , who was present at today's visit.      I " "reviewed prior neurology notes, most recent labs, as documented in Epic/amSTATZ, and summarized above.        Objective:    Blood pressure 125/60, pulse 98, temperature 98.1 °F (36.7 °C), temperature source Temporal, height 5' 2\" (1.575 m), weight 76 kg (167 lb 9.6 oz).  Physical Exam  No apparent distress.   Appears well nourished.   Mood appropriate for situation     Neurologic Exam  Mental status- alert and oriented to person, place, and time. Speech appropriate for conversation. Good attention and knowledge.      Cranial Nerves- PERRL, EOMS normal, facial sensation and muscles symmetric, hearing intact bilaterally to finger rubs, tongue midline, palate rise symmetrical, shoulder shrug symmetrical.     Motor- No pronator drift. Appropriate strength. Moves all extremities freely. No tremor.     Sensory-  Intact distally in all extremities to light touch.      DTRs- 2+ and symmetric in bilateral upper extremities. Bilateral knee replacements.      Gait- cautious normal casual gait.      Coordination- FNF intact.      ROS:  Review of Systems   Constitutional:  Negative for appetite change, fatigue and fever.   HENT: Negative.  Negative for hearing loss, tinnitus, trouble swallowing and voice change.    Eyes: Negative.  Negative for photophobia, pain and visual disturbance.   Respiratory: Negative.  Negative for shortness of breath.    Cardiovascular: Negative.  Negative for palpitations.   Gastrointestinal: Negative.  Negative for nausea and vomiting.   Endocrine: Negative.  Negative for cold intolerance.   Genitourinary: Negative.  Negative for dysuria, frequency and urgency.   Musculoskeletal:  Negative for back pain, gait problem, myalgias, neck pain and neck stiffness.   Skin: Negative.  Negative for rash.   Allergic/Immunologic: Negative.    Neurological: Negative.  Negative for dizziness, tremors, seizures, syncope, facial asymmetry, speech difficulty, weakness, light-headedness, numbness and headaches. "   Hematological: Negative.  Does not bruise/bleed easily.   Psychiatric/Behavioral: Negative.  Negative for confusion, hallucinations and sleep disturbance.    All other systems reviewed and are negative.    ROS obtained by MA and reviewed by myself.       This note may have been created using voice recognition software. There may be unintentional errors such as grammatical errors, spelling errors, or pronoun errors.

## 2024-05-21 NOTE — PATIENT INSTRUCTIONS
- Continue current dose of levetiracetam 750 mg twice per day  - Have blood work completed   - Depending on your B12 or vitamin D level we may recommend a supplement  - Call the office with seizures, issues or concerns  - Follow up in 6 months with Dr Juarez

## 2024-05-21 NOTE — ASSESSMENT & PLAN NOTE
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 There are no clear side effects of this medication although she does report fatigue. Checking levetiracetam level.     Will check B12 and vitamin D due to fatigue as well. There is a history of vitamin D deficiency. She does not eat much meat which can contribute to vitamin B12 deficiency as well as long term statin use (atorvastatin).     She will continue current dose of levetiracetam 750 mg BID. With breakthrough seizures or concerns she will call the office. Follow up in 6 months or sooner if needed.

## 2024-05-31 ENCOUNTER — RA CDI HCC (OUTPATIENT)
Dept: OTHER | Facility: HOSPITAL | Age: 89
End: 2024-05-31

## 2024-06-03 ENCOUNTER — APPOINTMENT (OUTPATIENT)
Dept: LAB | Facility: IMAGING CENTER | Age: 89
End: 2024-06-03
Payer: MEDICARE

## 2024-06-03 DIAGNOSIS — Z79.899 OTHER LONG TERM (CURRENT) DRUG THERAPY: ICD-10-CM

## 2024-06-03 DIAGNOSIS — E11.9 TYPE 2 DIABETES MELLITUS WITHOUT COMPLICATION, UNSPECIFIED WHETHER LONG TERM INSULIN USE (HCC): ICD-10-CM

## 2024-06-03 DIAGNOSIS — G40.209 PARTIAL SYMPTOMATIC EPILEPSY WITH COMPLEX PARTIAL SEIZURES, NOT INTRACTABLE, WITHOUT STATUS EPILEPTICUS (HCC): ICD-10-CM

## 2024-06-03 DIAGNOSIS — E55.9 VITAMIN D DEFICIENCY: ICD-10-CM

## 2024-06-03 DIAGNOSIS — R53.83 FATIGUE: ICD-10-CM

## 2024-06-03 LAB
25(OH)D3 SERPL-MCNC: >120 NG/ML (ref 30–100)
ALBUMIN SERPL BCP-MCNC: 4.2 G/DL (ref 3.5–5)
ALP SERPL-CCNC: 58 U/L (ref 34–104)
ALT SERPL W P-5'-P-CCNC: 22 U/L (ref 7–52)
ANION GAP SERPL CALCULATED.3IONS-SCNC: 9 MMOL/L (ref 4–13)
AST SERPL W P-5'-P-CCNC: 22 U/L (ref 13–39)
BASOPHILS # BLD AUTO: 0.04 THOUSANDS/ÂΜL (ref 0–0.1)
BASOPHILS NFR BLD AUTO: 1 % (ref 0–1)
BILIRUB SERPL-MCNC: 0.4 MG/DL (ref 0.2–1)
BUN SERPL-MCNC: 17 MG/DL (ref 5–25)
CALCIUM SERPL-MCNC: 9.3 MG/DL (ref 8.4–10.2)
CHLORIDE SERPL-SCNC: 103 MMOL/L (ref 96–108)
CHOLEST SERPL-MCNC: 130 MG/DL
CO2 SERPL-SCNC: 30 MMOL/L (ref 21–32)
CREAT SERPL-MCNC: 0.77 MG/DL (ref 0.6–1.3)
CREAT UR-MCNC: 34.4 MG/DL
EOSINOPHIL # BLD AUTO: 0.15 THOUSAND/ÂΜL (ref 0–0.61)
EOSINOPHIL NFR BLD AUTO: 3 % (ref 0–6)
ERYTHROCYTE [DISTWIDTH] IN BLOOD BY AUTOMATED COUNT: 13.4 % (ref 11.6–15.1)
EST. AVERAGE GLUCOSE BLD GHB EST-MCNC: 154 MG/DL
GFR SERPL CREATININE-BSD FRML MDRD: 69 ML/MIN/1.73SQ M
GLUCOSE P FAST SERPL-MCNC: 138 MG/DL (ref 65–99)
HBA1C MFR BLD: 7 %
HCT VFR BLD AUTO: 41.6 % (ref 34.8–46.1)
HDLC SERPL-MCNC: 36 MG/DL
HGB BLD-MCNC: 13 G/DL (ref 11.5–15.4)
IMM GRANULOCYTES # BLD AUTO: 0.01 THOUSAND/UL (ref 0–0.2)
IMM GRANULOCYTES NFR BLD AUTO: 0 % (ref 0–2)
LDLC SERPL CALC-MCNC: 34 MG/DL (ref 0–100)
LEVETIRACETAM SERPL-MCNC: 22.9 UG/ML (ref 12–46)
LYMPHOCYTES # BLD AUTO: 1.37 THOUSANDS/ÂΜL (ref 0.6–4.47)
LYMPHOCYTES NFR BLD AUTO: 23 % (ref 14–44)
MCH RBC QN AUTO: 29 PG (ref 26.8–34.3)
MCHC RBC AUTO-ENTMCNC: 31.3 G/DL (ref 31.4–37.4)
MCV RBC AUTO: 93 FL (ref 82–98)
MICROALBUMIN UR-MCNC: <7 MG/L
MICROALBUMIN/CREAT 24H UR: <20 MG/G CREATININE (ref 0–30)
MONOCYTES # BLD AUTO: 0.44 THOUSAND/ÂΜL (ref 0.17–1.22)
MONOCYTES NFR BLD AUTO: 7 % (ref 4–12)
NEUTROPHILS # BLD AUTO: 3.92 THOUSANDS/ÂΜL (ref 1.85–7.62)
NEUTS SEG NFR BLD AUTO: 66 % (ref 43–75)
NRBC BLD AUTO-RTO: 0 /100 WBCS
PLATELET # BLD AUTO: 259 THOUSANDS/UL (ref 149–390)
PMV BLD AUTO: 10.6 FL (ref 8.9–12.7)
POTASSIUM SERPL-SCNC: 4.8 MMOL/L (ref 3.5–5.3)
PROT SERPL-MCNC: 7.2 G/DL (ref 6.4–8.4)
RBC # BLD AUTO: 4.49 MILLION/UL (ref 3.81–5.12)
SODIUM SERPL-SCNC: 142 MMOL/L (ref 135–147)
TRIGL SERPL-MCNC: 298 MG/DL
TSH SERPL DL<=0.05 MIU/L-ACNC: 1.27 UIU/ML (ref 0.45–4.5)
VIT B12 SERPL-MCNC: 327 PG/ML (ref 180–914)
WBC # BLD AUTO: 5.93 THOUSAND/UL (ref 4.31–10.16)

## 2024-06-03 PROCEDURE — 82306 VITAMIN D 25 HYDROXY: CPT

## 2024-06-03 PROCEDURE — 85025 COMPLETE CBC W/AUTO DIFF WBC: CPT

## 2024-06-03 PROCEDURE — 84443 ASSAY THYROID STIM HORMONE: CPT

## 2024-06-03 PROCEDURE — 82043 UR ALBUMIN QUANTITATIVE: CPT

## 2024-06-03 PROCEDURE — 83520 IMMUNOASSAY QUANT NOS NONAB: CPT

## 2024-06-03 PROCEDURE — 80053 COMPREHEN METABOLIC PANEL: CPT

## 2024-06-03 PROCEDURE — 83036 HEMOGLOBIN GLYCOSYLATED A1C: CPT

## 2024-06-03 PROCEDURE — 82570 ASSAY OF URINE CREATININE: CPT

## 2024-06-03 PROCEDURE — 36415 COLL VENOUS BLD VENIPUNCTURE: CPT

## 2024-06-03 PROCEDURE — 82607 VITAMIN B-12: CPT

## 2024-06-03 PROCEDURE — 80061 LIPID PANEL: CPT

## 2024-06-05 ENCOUNTER — TELEPHONE (OUTPATIENT)
Dept: NEUROLOGY | Facility: CLINIC | Age: 89
End: 2024-06-05

## 2024-06-05 NOTE — TELEPHONE ENCOUNTER
Called re: below and spoke with pt, who verbalized an understanding.        Jennifre Juarez MD  6/3/2024  2:15 PM EDT Back to Top      Vitamin D level is excessively high.  She should stop taking vitamin D supplements and have the level rechecked in 6 months. Or follow-up with her PCP.  Levetiracetam level is good.  B12 level is on the low normal end.

## 2024-06-05 NOTE — TELEPHONE ENCOUNTER
----- Message from VIRGIE Lovelace sent at 6/3/2024  1:06 PM EDT -----    ----- Message -----  From: Lab, Background User  Sent: 6/3/2024  12:57 PM EDT  To: VIRGIE Melendez

## 2024-06-10 ENCOUNTER — OFFICE VISIT (OUTPATIENT)
Dept: FAMILY MEDICINE CLINIC | Facility: CLINIC | Age: 89
End: 2024-06-10
Payer: MEDICARE

## 2024-06-10 VITALS
HEIGHT: 62 IN | RESPIRATION RATE: 16 BRPM | TEMPERATURE: 97.5 F | BODY MASS INDEX: 30.84 KG/M2 | HEART RATE: 83 BPM | OXYGEN SATURATION: 97 % | WEIGHT: 167.6 LBS | SYSTOLIC BLOOD PRESSURE: 130 MMHG | DIASTOLIC BLOOD PRESSURE: 76 MMHG

## 2024-06-10 DIAGNOSIS — E78.49 OTHER HYPERLIPIDEMIA: ICD-10-CM

## 2024-06-10 DIAGNOSIS — G40.209 PARTIAL SYMPTOMATIC EPILEPSY WITH COMPLEX PARTIAL SEIZURES, NOT INTRACTABLE, WITHOUT STATUS EPILEPTICUS (HCC): ICD-10-CM

## 2024-06-10 DIAGNOSIS — E55.9 VITAMIN D INSUFFICIENCY: ICD-10-CM

## 2024-06-10 DIAGNOSIS — N39.46 MIXED STRESS AND URGE URINARY INCONTINENCE: ICD-10-CM

## 2024-06-10 DIAGNOSIS — E11.22 TYPE 2 DIABETES MELLITUS WITH STAGE 3A CHRONIC KIDNEY DISEASE, WITHOUT LONG-TERM CURRENT USE OF INSULIN (HCC): Primary | ICD-10-CM

## 2024-06-10 DIAGNOSIS — Z00.00 MEDICARE ANNUAL WELLNESS VISIT, SUBSEQUENT: ICD-10-CM

## 2024-06-10 DIAGNOSIS — I10 ESSENTIAL HYPERTENSION: ICD-10-CM

## 2024-06-10 DIAGNOSIS — N18.31 STAGE 3A CHRONIC KIDNEY DISEASE (HCC): ICD-10-CM

## 2024-06-10 DIAGNOSIS — N18.31 TYPE 2 DIABETES MELLITUS WITH STAGE 3A CHRONIC KIDNEY DISEASE, WITHOUT LONG-TERM CURRENT USE OF INSULIN (HCC): Primary | ICD-10-CM

## 2024-06-10 PROCEDURE — G0444 DEPRESSION SCREEN ANNUAL: HCPCS | Performed by: FAMILY MEDICINE

## 2024-06-10 PROCEDURE — G0439 PPPS, SUBSEQ VISIT: HCPCS | Performed by: FAMILY MEDICINE

## 2024-06-10 PROCEDURE — 99214 OFFICE O/P EST MOD 30 MIN: CPT | Performed by: FAMILY MEDICINE

## 2024-06-10 NOTE — PROGRESS NOTES
Ambulatory Visit  Name: Sherley Fields      : 1935      MRN: [id number]  Encounter Provider: Nolvia Devlin MD  Encounter Date: 6/10/2024   [facility]    Assessment & Plan   1. Type 2 diabetes mellitus with stage 3a chronic kidney disease, without long-term current use of insulin (HCC)  Assessment & Plan:  A1c is well-controlled. Continue on Januvia 100mg daily. Continue to monitor.  Lab Results   Component Value Date    HGBA1C 7.0 (H) 2024     Orders:  -     Hemoglobin A1C; Future; Expected date: 06/10/2024  -     Comprehensive metabolic panel; Future; Expected date: 06/10/2024  -     CBC and differential; Future; Expected date: 06/10/2024  -     Lipid Panel with Direct LDL reflex; Future; Expected date: 06/10/2024  -     TSH, 3rd generation with Free T4 reflex; Future; Expected date: 06/10/2024  2. Partial symptomatic epilepsy with complex partial seizures, not intractable, without status epilepticus (HCC)  Assessment & Plan:  Continue follow-up with neurology. Continue on levetiracetam.  3. Essential hypertension  Assessment & Plan:  Well-controlled on Avapro 75mg daily. Continue to monitor and continue med regimen.  4. Stage 3a chronic kidney disease (HCC)  Assessment & Plan:  Will continue to monitor. Encourage oral hydration.  Lab Results   Component Value Date    EGFR 69 2024    EGFR 65 2023    EGFR 61 2023    CREATININE 0.77 2024    CREATININE 0.81 2023    CREATININE 0.85 2023     5. Other hyperlipidemia  Assessment & Plan:  Well-controlled on Lipitor 10mg daily. Continue medication. Will continue to monitor.  6. Vitamin D insufficiency  Assessment & Plan:  Vit D level >120. Advised to stop taking vit D supplements. Will recheck vit D levels. Will continue to monitor.  Orders:  -     Vitamin D 25 hydroxy; Future  7. Medicare annual wellness visit, subsequent  Assessment & Plan:  It was discussed about  immunizations, diet, exercise and safety measures   8. Mixed stress and urge urinary incontinence       Preventive health issues were discussed with patient, and age appropriate screening tests were ordered as noted in patient's After Visit Summary. Personalized health advice and appropriate referrals for health education or preventive services given if needed, as noted in patient's After Visit Summary.    History of Present Illness     HPI   Patient Care Team:  Nolvia Devlin MD as PCP - General (Family Medicine)    Review of Systems  Medical History Reviewed by provider this encounter:  Tobacco  Allergies  Meds  Problems  Med Hx  Surg Hx  Fam Hx       Annual Wellness Visit Questionnaire   Sherley is here for her Subsequent Wellness visit.     Health Risk Assessment:   Patient rates overall health as good. Patient feels that their physical health rating is same. Patient is satisfied with their life. Eyesight was rated as slightly worse. Hearing was rated as slightly worse. Patient feels that their emotional and mental health rating is same. Patients states they are never, rarely angry. Patient states they are sometimes unusually tired/fatigued. Pain experienced in the last 7 days has been some. Patient's pain rating has been 5/10. Patient states that she has experienced weight loss or gain in last 6 months.     Depression Screening:   PHQ-2 Score: 0      Fall Risk Screening:   In the past year, patient has experienced: no history of falling in past year      Urinary Incontinence Screening:   Patient has leaked urine accidently in the last six months.     Home Safety:  Patient does not have trouble with stairs inside or outside of their home. Patient has working smoke alarms and has no working carbon monoxide detector. Home safety hazards include: none.     Nutrition:   Current diet is Regular.     Medications:   Patient is currently taking over-the-counter supplements. OTC medications include: see  medication list. Patient is able to manage medications.     Activities of Daily Living (ADLs)/Instrumental Activities of Daily Living (IADLs):   Walk and transfer into and out of bed and chair?: Yes  Dress and groom yourself?: Yes    Bathe or shower yourself?: Yes    Feed yourself? Yes  Do your laundry/housekeeping?: Yes  Manage your money, pay your bills and track your expenses?: Yes  Make your own meals?: Yes    Do your own shopping?: Yes    Previous Hospitalizations:   Any hospitalizations or ED visits within the last 12 months?: No      Advance Care Planning:   Living will: No    Durable POA for healthcare: No    Advanced directive: No      Cognitive Screening:   Provider or family/friend/caregiver concerned regarding cognition?: No    PREVENTIVE SCREENINGS      Cardiovascular Screening:    General: Screening Not Indicated and History Lipid Disorder      Diabetes Screening:     General: Screening Not Indicated and History Diabetes      Colorectal Cancer Screening:     General: Screening Not Indicated      Breast Cancer Screening:     General: Screening Not Indicated      Cervical Cancer Screening:    General: Screening Not Indicated      Osteoporosis Screening:    General: Risks and Benefits Discussed      Abdominal Aortic Aneurysm (AAA) Screening:        General: Screening Not Indicated      Lung Cancer Screening:     General: Screening Not Indicated      Hepatitis C Screening:    General: Risks and Benefits Discussed    Screening, Brief Intervention, and Referral to Treatment (SBIRT)    Screening  Typical number of drinks in a day: 0  Typical number of drinks in a week: 1  Interpretation: Low risk drinking behavior.    Brief Intervention  Alcohol & drug use screenings were reviewed. No concerns regarding substance use disorder identified.     Annual Depression Screening  Time spent screening and evaluating the patient for depression during today's encounter was 6 minutes.    Other Counseling Topics:   Calcium  "and vitamin D intake and regular weightbearing exercise.        No results found.    Objective     /76 (BP Location: Left arm, Patient Position: Sitting, Cuff Size: Large)   Pulse 83   Temp 97.5 °F (36.4 °C) (Tympanic)   Resp 16   Ht 5' 2\" (1.575 m)   Wt 76 kg (167 lb 9.6 oz)   SpO2 97%   BMI 30.65 kg/m²     Physical Exam  Administrative Statements           "

## 2024-06-10 NOTE — PROGRESS NOTES
Ambulatory Visit  Name: Sherley Fields      : 1935      MRN: [id number]  Encounter Provider: Nolvia Devlin MD  Encounter Date: 6/10/2024   [facility]    Assessment & Plan   1. Type 2 diabetes mellitus with stage 3a chronic kidney disease, without long-term current use of insulin (HCC)  Assessment & Plan:  A1c is well-controlled. Continue on Januvia 100mg daily. Continue to monitor.  Lab Results   Component Value Date    HGBA1C 7.0 (H) 2024     Orders:  -     Hemoglobin A1C; Future; Expected date: 06/10/2024  -     Comprehensive metabolic panel; Future; Expected date: 06/10/2024  -     CBC and differential; Future; Expected date: 06/10/2024  -     Lipid Panel with Direct LDL reflex; Future; Expected date: 06/10/2024  -     TSH, 3rd generation with Free T4 reflex; Future; Expected date: 06/10/2024  2. Partial symptomatic epilepsy with complex partial seizures, not intractable, without status epilepticus (HCC)  Assessment & Plan:  Continue follow-up with neurology. Continue on levetiracetam.  3. Essential hypertension  Assessment & Plan:  Well-controlled on Avapro 75mg daily. Continue to monitor and continue med regimen.  4. Stage 3a chronic kidney disease (HCC)  Assessment & Plan:  Will continue to monitor. Encourage oral hydration.  Lab Results   Component Value Date    EGFR 69 2024    EGFR 65 2023    EGFR 61 2023    CREATININE 0.77 2024    CREATININE 0.81 2023    CREATININE 0.85 2023     5. Other hyperlipidemia  Assessment & Plan:  Well-controlled on Lipitor 10mg daily. Continue medication. Will continue to monitor.  6. Vitamin D insufficiency  Assessment & Plan:  Vit D level >120. Advised to stop taking vit D supplements. Will recheck vit D levels. Will continue to monitor.  Orders:  -     Vitamin D 25 hydroxy; Future  7. Medicare annual wellness visit, subsequent  Assessment & Plan:  It was discussed about  immunizations, diet, exercise and safety measures   8. Mixed stress and urge urinary incontinence         History of Present Illness     88 y/o female with PMH of diabetes, CKD, hypertension, hyperlipidemia, epilepsy presenting to the office for her annual medicare wellness visit. She reports no specific concerns today. She is taking her medications regularly with no reported side effects. Her labwork was reviewed at today's visit and patient expressed understanding. Her Vit D levels are >120 which was discussed and patient is taking vit D supplements which she was advised to stop taking altogether. Her other labwork shows A1c at 7%, triglycerides at 298, HDL at 36. She follows with neurology for her epilepsy.       Review of Systems   Constitutional:  Negative for chills and fever.   HENT:  Negative for ear pain and sore throat.    Eyes:  Negative for pain and visual disturbance.   Respiratory:  Negative for cough and shortness of breath.    Cardiovascular:  Negative for chest pain and palpitations.   Gastrointestinal:  Negative for abdominal pain and vomiting.   Genitourinary:  Negative for dysuria and hematuria.   Musculoskeletal:  Negative for arthralgias and back pain.   Skin:  Negative for color change and rash.   Neurological:  Negative for seizures and syncope.   All other systems reviewed and are negative.    Past Medical History:   Diagnosis Date   • Diabetes mellitus (HCC)    • Dyslipidemia    • Hemochromatosis    • Hypertension    • Seizures (HCC)      Past Surgical History:   Procedure Laterality Date   • CATARACT EXTRACTION, BILATERAL Bilateral 10/2019   • KNEE SURGERY Bilateral      Family History   Problem Relation Age of Onset   • No Known Problems Family    • Diabetes Maternal Grandfather      Social History     Tobacco Use   • Smoking status: Never   • Smokeless tobacco: Never   Vaping Use   • Vaping status: Never Used   Substance and Sexual Activity   • Alcohol use: Yes   • Drug use: No   •  "Sexual activity: Not on file     Current Outpatient Medications on File Prior to Visit   Medication Sig   • atorvastatin (LIPITOR) 10 mg tablet TAKE 1 TABLET(10 MG) BY MOUTH DAILY   • Cholecalciferol (Vitamin D-3) 125 MCG (5000 UT) TABS Take by mouth   • glucose blood test strip Pt to test blood sugars twice daily   • irbesartan (AVAPRO) 75 mg tablet TAKE 1 TABLET(75 MG) BY MOUTH DAILY   • Januvia 100 MG tablet TAKE ONE TABLET BY MOUTH EVERY DAY   • Lancet Devices (ACCU-CHEK SOFTCLIX) lancets Use as instructed   • Lancets Super Thin 28G MISC Pt test bllod sugar twice daily   • levETIRAcetam (KEPPRA) 750 mg tablet Take 1 tablet (750 mg total) by mouth 2 (two) times a day   • Multiple Vitamin (multivitamin) tablet Take 1 tablet by mouth daily   • aspirin (ECOTRIN LOW STRENGTH) 81 mg EC tablet Take 1 tablet (81 mg total) by mouth daily (Patient not taking: Reported on 5/21/2024)   • fluticasone (FLONASE) 50 mcg/act nasal spray SHAKE LIQUID AND USE 2 SPRAYS IN EACH NOSTRIL DAILY (Patient not taking: Reported on 5/21/2024)     Allergies   Allergen Reactions   • No Active Allergies      Immunization History   Administered Date(s) Administered   • COVID-19 MODERNA VACC 0.5 ML IM 01/12/2021, 02/09/2021, 12/17/2021   • Influenza, high dose seasonal 0.7 mL 10/08/2020, 11/23/2021   • Pneumococcal Conjugate 13-Valent 10/20/2020   • Pneumococcal Polysaccharide PPV23 11/23/2021   • Tdap 07/27/2018     Objective     /76 (BP Location: Left arm, Patient Position: Sitting, Cuff Size: Large)   Pulse 83   Temp 97.5 °F (36.4 °C) (Tympanic)   Resp 16   Ht 5' 2\" (1.575 m)   Wt 76 kg (167 lb 9.6 oz)   SpO2 97%   BMI 30.65 kg/m²     Physical Exam  Vitals and nursing note reviewed.   Constitutional:       General: She is not in acute distress.     Appearance: She is well-developed.   HENT:      Head: Normocephalic and atraumatic.   Eyes:      Conjunctiva/sclera: Conjunctivae normal.   Cardiovascular:      Rate and Rhythm: Normal " rate and regular rhythm.      Heart sounds: No murmur heard.  Pulmonary:      Effort: Pulmonary effort is normal. No respiratory distress.      Breath sounds: Normal breath sounds.   Abdominal:      Palpations: Abdomen is soft.      Tenderness: There is no abdominal tenderness.   Musculoskeletal:         General: No swelling.      Cervical back: Neck supple.   Skin:     General: Skin is warm and dry.      Capillary Refill: Capillary refill takes less than 2 seconds.   Neurological:      Mental Status: She is alert.   Psychiatric:         Mood and Affect: Mood normal.       Administrative Statements

## 2024-06-10 NOTE — ASSESSMENT & PLAN NOTE
Will continue to monitor. Encourage oral hydration.  Lab Results   Component Value Date    EGFR 69 06/03/2024    EGFR 65 11/08/2023    EGFR 61 05/30/2023    CREATININE 0.77 06/03/2024    CREATININE 0.81 11/08/2023    CREATININE 0.85 05/30/2023

## 2024-06-10 NOTE — ASSESSMENT & PLAN NOTE
Pt called the service and spoke with Dr. Barahona with c/o spotting in early pregnancy.  Pts LMP 11/27/2019, positive pregnancy test.  Per Dr. Barahona pt to come in to office for labs and US.   Pt informed that no US tech is here today and to come in for labs.  Pt transferred to PSR   Well-controlled on Avapro 75mg daily. Continue to monitor and continue med regimen.

## 2024-06-10 NOTE — ASSESSMENT & PLAN NOTE
A1c is well-controlled. Continue on Januvia 100mg daily. Continue to monitor.  Lab Results   Component Value Date    HGBA1C 7.0 (H) 06/03/2024

## 2024-06-10 NOTE — ASSESSMENT & PLAN NOTE
Vit D level >120. Advised to stop taking vit D supplements. Will recheck vit D levels. Will continue to monitor.

## 2024-06-21 DIAGNOSIS — E78.5 HYPERLIPIDEMIA, UNSPECIFIED HYPERLIPIDEMIA TYPE: ICD-10-CM

## 2024-06-21 RX ORDER — ATORVASTATIN CALCIUM 10 MG/1
10 TABLET, FILM COATED ORAL DAILY
Qty: 90 TABLET | Refills: 1 | Status: SHIPPED | OUTPATIENT
Start: 2024-06-21

## 2024-07-08 DIAGNOSIS — E78.5 HYPERLIPIDEMIA, UNSPECIFIED HYPERLIPIDEMIA TYPE: ICD-10-CM

## 2024-07-08 RX ORDER — ATORVASTATIN CALCIUM 10 MG/1
10 TABLET, FILM COATED ORAL DAILY
Qty: 100 TABLET | Refills: 1 | Status: SHIPPED | OUTPATIENT
Start: 2024-07-08

## 2024-07-08 NOTE — TELEPHONE ENCOUNTER
Reason for call:     Patient states she was just at the pharmacy and they do not have the script for atorvastatin, she is asking for it to be resent to the pharmacy.      [x] Refill   [] Prior Auth  [] Other:     Office:   [x] PCP/Provider - Dr Devlin  [] Specialty/Provider -     Medication:   Atorvastatin 10 mg, 1 qd, 90    Pharmacy:   [facility]

## 2024-07-09 DIAGNOSIS — I10 HYPERTENSION, UNSPECIFIED TYPE: ICD-10-CM

## 2024-07-09 RX ORDER — IRBESARTAN 75 MG/1
75 TABLET ORAL DAILY
Qty: 90 TABLET | Refills: 1 | Status: SHIPPED | OUTPATIENT
Start: 2024-07-09

## 2024-07-10 PROBLEM — Z00.00 MEDICARE ANNUAL WELLNESS VISIT, SUBSEQUENT: Status: RESOLVED | Noted: 2019-05-09 | Resolved: 2024-07-10

## 2024-08-26 DIAGNOSIS — I10 HYPERTENSION, UNSPECIFIED TYPE: ICD-10-CM

## 2024-08-26 NOTE — TELEPHONE ENCOUNTER
Reason for call:   [x] Refill   [] Prior Auth  [] Other:     Office:   [x] PCP/Provider - [facility]   [] Specialty/Provider -     Medication: irbesartan (AVAPRO) 75 mg tablet     Dose/Frequency: 75 mg, Daily     Quantity: 90     Pharmacy: [id number]    Does the patient have enough for 3 days?   [x] Yes   [] No - Send as HP to POD

## 2024-08-27 RX ORDER — IRBESARTAN 75 MG/1
75 TABLET ORAL DAILY
Qty: 90 TABLET | Refills: 0 | OUTPATIENT
Start: 2024-08-27

## 2024-10-18 ENCOUNTER — TELEPHONE (OUTPATIENT)
Age: 89
End: 2024-10-18

## 2024-10-18 DIAGNOSIS — G40.209 PARTIAL SYMPTOMATIC EPILEPSY WITH COMPLEX PARTIAL SEIZURES, NOT INTRACTABLE, WITHOUT STATUS EPILEPTICUS (HCC): ICD-10-CM

## 2024-10-18 RX ORDER — LEVETIRACETAM 750 MG/1
750 TABLET ORAL 2 TIMES DAILY
Qty: 180 TABLET | Refills: 1 | Status: SHIPPED | OUTPATIENT
Start: 2024-10-18

## 2024-10-18 NOTE — TELEPHONE ENCOUNTER
Patient requesting medication refill:    Levetiracetam 750 mg  last sent on 5/21/24 Quantity 180 tablets  with 3 refills.    LOV 5/21/24 with Renae GARVIN  NOV 2/4/25 Dr. Juarez (pt needed to reschedule from November appt)    Dr. Juarez: Levetiracetam pended. Please sign if agreeable.

## 2024-10-18 NOTE — TELEPHONE ENCOUNTER
Pt called back to returned call back about needing to r/s her appt with .     Pt was scheduled for the first available with  on 02/04/2025.     She is concerned that she only has 18 days left of her medication and now needs a refill of levETIRAcetam (KEPPRA) 750 mg tablet.    Please advise pt if she will be able to get a refill sent to   [address] [id number]   Store number: [id number]   Near the intersection of: [address] & [place]

## 2024-12-05 ENCOUNTER — TELEPHONE (OUTPATIENT)
Age: 89
End: 2024-12-05

## 2024-12-05 ENCOUNTER — APPOINTMENT (OUTPATIENT)
Dept: LAB | Facility: IMAGING CENTER | Age: 89
End: 2024-12-05
Payer: MEDICARE

## 2024-12-05 ENCOUNTER — TELEPHONE (OUTPATIENT)
Dept: FAMILY MEDICINE CLINIC | Facility: CLINIC | Age: 89
End: 2024-12-05

## 2024-12-05 DIAGNOSIS — R35.0 URINARY FREQUENCY: ICD-10-CM

## 2024-12-05 DIAGNOSIS — N18.31 TYPE 2 DIABETES MELLITUS WITH STAGE 3A CHRONIC KIDNEY DISEASE, WITHOUT LONG-TERM CURRENT USE OF INSULIN (HCC): ICD-10-CM

## 2024-12-05 DIAGNOSIS — E55.9 VITAMIN D INSUFFICIENCY: ICD-10-CM

## 2024-12-05 DIAGNOSIS — E11.22 TYPE 2 DIABETES MELLITUS WITH STAGE 3A CHRONIC KIDNEY DISEASE, WITHOUT LONG-TERM CURRENT USE OF INSULIN (HCC): ICD-10-CM

## 2024-12-05 DIAGNOSIS — R35.0 URINARY FREQUENCY: Primary | ICD-10-CM

## 2024-12-05 LAB
25(OH)D3 SERPL-MCNC: 102.5 NG/ML (ref 30–100)
ALBUMIN SERPL BCG-MCNC: 4.4 G/DL (ref 3.5–5)
ALP SERPL-CCNC: 56 U/L (ref 34–104)
ALT SERPL W P-5'-P-CCNC: 16 U/L (ref 7–52)
ANION GAP SERPL CALCULATED.3IONS-SCNC: 10 MMOL/L (ref 4–13)
AST SERPL W P-5'-P-CCNC: 15 U/L (ref 13–39)
BACTERIA UR QL AUTO: ABNORMAL /HPF
BASOPHILS # BLD AUTO: 0.04 THOUSANDS/ÂΜL (ref 0–0.1)
BASOPHILS NFR BLD AUTO: 1 % (ref 0–1)
BILIRUB SERPL-MCNC: 0.49 MG/DL (ref 0.2–1)
BILIRUB UR QL STRIP: NEGATIVE
BUN SERPL-MCNC: 16 MG/DL (ref 5–25)
CALCIUM SERPL-MCNC: 9.6 MG/DL (ref 8.4–10.2)
CHLORIDE SERPL-SCNC: 102 MMOL/L (ref 96–108)
CHOLEST SERPL-MCNC: 115 MG/DL (ref ?–200)
CLARITY UR: ABNORMAL
CO2 SERPL-SCNC: 29 MMOL/L (ref 21–32)
COLOR UR: YELLOW
CREAT SERPL-MCNC: 0.69 MG/DL (ref 0.6–1.3)
EOSINOPHIL # BLD AUTO: 0.12 THOUSAND/ÂΜL (ref 0–0.61)
EOSINOPHIL NFR BLD AUTO: 2 % (ref 0–6)
ERYTHROCYTE [DISTWIDTH] IN BLOOD BY AUTOMATED COUNT: 13.1 % (ref 11.6–15.1)
EST. AVERAGE GLUCOSE BLD GHB EST-MCNC: 146 MG/DL
GFR SERPL CREATININE-BSD FRML MDRD: 77 ML/MIN/1.73SQ M
GLUCOSE P FAST SERPL-MCNC: 132 MG/DL (ref 65–99)
GLUCOSE UR STRIP-MCNC: NEGATIVE MG/DL
HBA1C MFR BLD: 6.7 %
HCT VFR BLD AUTO: 41.7 % (ref 34.8–46.1)
HDLC SERPL-MCNC: 39 MG/DL
HGB BLD-MCNC: 13.1 G/DL (ref 11.5–15.4)
HGB UR QL STRIP.AUTO: ABNORMAL
HYALINE CASTS #/AREA URNS LPF: ABNORMAL /LPF
IMM GRANULOCYTES # BLD AUTO: 0.02 THOUSAND/UL (ref 0–0.2)
IMM GRANULOCYTES NFR BLD AUTO: 0 % (ref 0–2)
KETONES UR STRIP-MCNC: NEGATIVE MG/DL
LDLC SERPL CALC-MCNC: 39 MG/DL (ref 0–100)
LEUKOCYTE ESTERASE UR QL STRIP: ABNORMAL
LYMPHOCYTES # BLD AUTO: 1.45 THOUSANDS/ÂΜL (ref 0.6–4.47)
LYMPHOCYTES NFR BLD AUTO: 19 % (ref 14–44)
MCH RBC QN AUTO: 29.3 PG (ref 26.8–34.3)
MCHC RBC AUTO-ENTMCNC: 31.4 G/DL (ref 31.4–37.4)
MCV RBC AUTO: 93 FL (ref 82–98)
MONOCYTES # BLD AUTO: 0.55 THOUSAND/ÂΜL (ref 0.17–1.22)
MONOCYTES NFR BLD AUTO: 7 % (ref 4–12)
MUCOUS THREADS UR QL AUTO: ABNORMAL
NEUTROPHILS # BLD AUTO: 5.52 THOUSANDS/ÂΜL (ref 1.85–7.62)
NEUTS SEG NFR BLD AUTO: 71 % (ref 43–75)
NITRITE UR QL STRIP: NEGATIVE
NON-SQ EPI CELLS URNS QL MICRO: ABNORMAL /HPF
NRBC BLD AUTO-RTO: 0 /100 WBCS
PH UR STRIP.AUTO: 5 [PH]
PLATELET # BLD AUTO: 244 THOUSANDS/UL (ref 149–390)
PMV BLD AUTO: 11 FL (ref 8.9–12.7)
POTASSIUM SERPL-SCNC: 4.6 MMOL/L (ref 3.5–5.3)
PROT SERPL-MCNC: 7.5 G/DL (ref 6.4–8.4)
PROT UR STRIP-MCNC: ABNORMAL MG/DL
RBC # BLD AUTO: 4.47 MILLION/UL (ref 3.81–5.12)
RBC #/AREA URNS AUTO: ABNORMAL /HPF
SODIUM SERPL-SCNC: 141 MMOL/L (ref 135–147)
SP GR UR STRIP.AUTO: 1.02 (ref 1–1.03)
TRIGL SERPL-MCNC: 183 MG/DL (ref ?–150)
TSH SERPL DL<=0.05 MIU/L-ACNC: 1.23 UIU/ML (ref 0.45–4.5)
UROBILINOGEN UR STRIP-ACNC: <2 MG/DL
WBC # BLD AUTO: 7.7 THOUSAND/UL (ref 4.31–10.16)
WBC #/AREA URNS AUTO: ABNORMAL /HPF

## 2024-12-05 PROCEDURE — 83036 HEMOGLOBIN GLYCOSYLATED A1C: CPT

## 2024-12-05 PROCEDURE — 81001 URINALYSIS AUTO W/SCOPE: CPT

## 2024-12-05 PROCEDURE — 82306 VITAMIN D 25 HYDROXY: CPT

## 2024-12-05 PROCEDURE — 87186 SC STD MICRODIL/AGAR DIL: CPT

## 2024-12-05 PROCEDURE — 87086 URINE CULTURE/COLONY COUNT: CPT

## 2024-12-05 PROCEDURE — 84443 ASSAY THYROID STIM HORMONE: CPT

## 2024-12-05 PROCEDURE — 87077 CULTURE AEROBIC IDENTIFY: CPT

## 2024-12-05 PROCEDURE — 36415 COLL VENOUS BLD VENIPUNCTURE: CPT

## 2024-12-05 PROCEDURE — 85025 COMPLETE CBC W/AUTO DIFF WBC: CPT

## 2024-12-05 PROCEDURE — 80053 COMPREHEN METABOLIC PANEL: CPT

## 2024-12-05 PROCEDURE — 80061 LIPID PANEL: CPT

## 2024-12-05 NOTE — TELEPHONE ENCOUNTER
[facility] lab called stating pt was there for blood work and brought a urine with her as she has UTI symptoms.   Urine culture and UA placed  Pt has appt 12/10/24

## 2024-12-05 NOTE — TELEPHONE ENCOUNTER
[facility] lab called. Patient is there to do her lab work and has a urine sample to drop off. There are no orders in the system for the urine labs. Per clinical, they will put lab orders in for patient now. Lab is aware.

## 2024-12-07 LAB
BACTERIA UR CULT: ABNORMAL
BACTERIA UR CULT: ABNORMAL

## 2024-12-08 LAB
BACTERIA UR CULT: ABNORMAL
BACTERIA UR CULT: ABNORMAL

## 2024-12-10 ENCOUNTER — OFFICE VISIT (OUTPATIENT)
Dept: FAMILY MEDICINE CLINIC | Facility: CLINIC | Age: 89
End: 2024-12-10
Payer: MEDICARE

## 2024-12-10 VITALS
HEART RATE: 92 BPM | RESPIRATION RATE: 16 BRPM | BODY MASS INDEX: 30.95 KG/M2 | DIASTOLIC BLOOD PRESSURE: 70 MMHG | SYSTOLIC BLOOD PRESSURE: 128 MMHG | TEMPERATURE: 97.6 F | OXYGEN SATURATION: 98 % | HEIGHT: 62 IN | WEIGHT: 168.2 LBS

## 2024-12-10 DIAGNOSIS — E55.9 VITAMIN D DEFICIENCY: ICD-10-CM

## 2024-12-10 DIAGNOSIS — G40.209 PARTIAL SYMPTOMATIC EPILEPSY WITH COMPLEX PARTIAL SEIZURES, NOT INTRACTABLE, WITHOUT STATUS EPILEPTICUS (HCC): ICD-10-CM

## 2024-12-10 DIAGNOSIS — E78.5 HYPERLIPIDEMIA, UNSPECIFIED HYPERLIPIDEMIA TYPE: ICD-10-CM

## 2024-12-10 DIAGNOSIS — N18.31 TYPE 2 DIABETES MELLITUS WITH STAGE 3A CHRONIC KIDNEY DISEASE, WITHOUT LONG-TERM CURRENT USE OF INSULIN (HCC): ICD-10-CM

## 2024-12-10 DIAGNOSIS — I10 HYPERTENSION, UNSPECIFIED TYPE: ICD-10-CM

## 2024-12-10 DIAGNOSIS — N18.31 STAGE 3A CHRONIC KIDNEY DISEASE (HCC): ICD-10-CM

## 2024-12-10 DIAGNOSIS — N30.00 ACUTE CYSTITIS WITHOUT HEMATURIA: Primary | ICD-10-CM

## 2024-12-10 DIAGNOSIS — E11.22 TYPE 2 DIABETES MELLITUS WITH STAGE 3A CHRONIC KIDNEY DISEASE, WITHOUT LONG-TERM CURRENT USE OF INSULIN (HCC): ICD-10-CM

## 2024-12-10 PROCEDURE — 99214 OFFICE O/P EST MOD 30 MIN: CPT | Performed by: FAMILY MEDICINE

## 2024-12-10 PROCEDURE — G2211 COMPLEX E/M VISIT ADD ON: HCPCS | Performed by: FAMILY MEDICINE

## 2024-12-10 RX ORDER — CIPROFLOXACIN 250 MG/1
250 TABLET, FILM COATED ORAL EVERY 12 HOURS SCHEDULED
Qty: 14 TABLET | Refills: 0 | Status: SHIPPED | OUTPATIENT
Start: 2024-12-10 | End: 2024-12-17

## 2024-12-10 NOTE — ASSESSMENT & PLAN NOTE
Well-controlled on Januvia 100mg tab daily. Continue same. Will continue to monitor A1C and fasting glucose.   Lab Results   Component Value Date    HGBA1C 6.7 (H) 12/05/2024       Orders:  •  Albumin / creatinine urine ratio; Future  •  Comprehensive metabolic panel; Future  •  Hemoglobin A1C; Future  •  CBC and differential; Future  •  Lipid Panel with Direct LDL reflex; Future  •  TSH, 3rd generation with Free T4 reflex; Future

## 2024-12-10 NOTE — PROGRESS NOTES
Name: Sherley Fields      : 1935      MRN: [id number]  Encounter Provider: Nolvia Devlin MD  Encounter Date: 12/10/2024   [facility]  :  Assessment & Plan  Acute cystitis without hematuria  Prescription sent for ciproloxacin. Discussed potential side effects. Follow up if not improving.   Orders:  •  ciprofloxacin (CIPRO) 250 mg tablet; Take 1 tablet (250 mg total) by mouth every 12 (twelve) hours for 7 days    Type 2 diabetes mellitus with stage 3a chronic kidney disease, without long-term current use of insulin (HCC)  Well-controlled on Januvia 100mg tab daily. Continue same. Will continue to monitor A1C and fasting glucose.   Lab Results   Component Value Date    HGBA1C 6.7 (H) 2024       Orders:  •  Albumin / creatinine urine ratio; Future  •  Comprehensive metabolic panel; Future  •  Hemoglobin A1C; Future  •  CBC and differential; Future  •  Lipid Panel with Direct LDL reflex; Future  •  TSH, 3rd generation with Free T4 reflex; Future    Hyperlipidemia, unspecified hyperlipidemia type  Triglycerides improving. Continue atorvastatin 10mg tab daily. Will continue to monitor fasting lipid profile.        Hypertension, unspecified type  Well-controlled on irbesartan. Continue same. Will continue to monitor.        Partial symptomatic epilepsy with complex partial seizures, not intractable, without status epilepticus (HCC)  Stable. Continue on Keppra. Continue follow up with neurology.        Stage 3a chronic kidney disease (HCC)  Lab Results   Component Value Date    EGFR 77 2024    EGFR 69 2024    EGFR 65 2023    CREATININE 0.69 2024    CREATININE 0.77 2024    CREATININE 0.81 2023   Stable. Encourage oral hydration. Will continue to monitor.          Vitamin D deficiency  Vitamin D levels are too high. Discussed that she needs to stop taking vitamin D supplements and we will continue to monitor.              "  History of Present Illness     89 y/o female with PMH of diabetes, HTN, hyperlipidemia presenting to the office for follow up of multiple medical problems. She is taking her medications daily with no reported side effects. She had recent labs showing A1C of 6.7%, triglycerides elevated, vitamin D levels elevated. She had elevated vitamin D levels previously and was told to stop taking vitamin D but she states she forgot and did continue taking it. She was also having UTI symptoms and had a UA and culture which was positive for E. Coli growth. She has no other specific concerns for today's visit.     Diabetes  Pertinent negatives for hypoglycemia include no seizures. Pertinent negatives for diabetes include no chest pain.   Hypertension  Pertinent negatives include no chest pain, palpitations or shortness of breath.   Hyperlipidemia  Pertinent negatives include no chest pain or shortness of breath.     Review of Systems   Constitutional:  Negative for chills and fever.   HENT:  Negative for ear pain and sore throat.    Eyes:  Negative for pain and visual disturbance.   Respiratory:  Negative for cough and shortness of breath.    Cardiovascular:  Negative for chest pain and palpitations.   Gastrointestinal:  Negative for abdominal pain and vomiting.   Genitourinary:  Positive for dysuria. Negative for hematuria.   Musculoskeletal:  Negative for arthralgias and back pain.   Skin:  Negative for color change and rash.   Neurological:  Negative for seizures and syncope.   All other systems reviewed and are negative.      Objective   /70 (BP Location: Left arm, Patient Position: Sitting, Cuff Size: Standard)   Pulse 92   Temp 97.6 °F (36.4 °C) (Tympanic)   Resp 16   Ht 5' 2\" (1.575 m)   Wt 76.3 kg (168 lb 3.2 oz)   SpO2 98%   BMI 30.76 kg/m²      Physical Exam  Vitals and nursing note reviewed.   Constitutional:       General: She is not in acute distress.     Appearance: She is well-developed.   HENT:      " Head: Normocephalic and atraumatic.   Eyes:      Conjunctiva/sclera: Conjunctivae normal.   Cardiovascular:      Rate and Rhythm: Normal rate and regular rhythm.      Heart sounds: No murmur heard.  Pulmonary:      Effort: Pulmonary effort is normal. No respiratory distress.      Breath sounds: Normal breath sounds.   Abdominal:      Palpations: Abdomen is soft.      Tenderness: There is no abdominal tenderness.   Musculoskeletal:         General: No swelling.   Skin:     General: Skin is warm and dry.   Neurological:      Mental Status: She is alert.   Psychiatric:         Mood and Affect: Mood normal.

## 2024-12-10 NOTE — ASSESSMENT & PLAN NOTE
Vitamin D levels are too high. Discussed that she needs to stop taking vitamin D supplements and we will continue to monitor.

## 2024-12-10 NOTE — ASSESSMENT & PLAN NOTE
Lab Results   Component Value Date    EGFR 77 12/05/2024    EGFR 69 06/03/2024    EGFR 65 11/08/2023    CREATININE 0.69 12/05/2024    CREATININE 0.77 06/03/2024    CREATININE 0.81 11/08/2023   Stable. Encourage oral hydration. Will continue to monitor.

## 2024-12-11 NOTE — ASSESSMENT & PLAN NOTE
Triglycerides improving. Continue atorvastatin 10mg tab daily. Will continue to monitor fasting lipid profile.

## 2024-12-11 NOTE — ASSESSMENT & PLAN NOTE
Prescription sent for ciproloxacin. Discussed potential side effects. Follow up if not improving.   Orders:  •  ciprofloxacin (CIPRO) 250 mg tablet; Take 1 tablet (250 mg total) by mouth every 12 (twelve) hours for 7 days

## 2024-12-16 DIAGNOSIS — E11.9 TYPE 2 DIABETES MELLITUS WITHOUT COMPLICATION, UNSPECIFIED WHETHER LONG TERM INSULIN USE (HCC): ICD-10-CM

## 2024-12-16 RX ORDER — SITAGLIPTIN 100 MG/1
100 TABLET, FILM COATED ORAL DAILY
Qty: 90 TABLET | Refills: 1 | Status: SHIPPED | OUTPATIENT
Start: 2024-12-16

## 2024-12-16 NOTE — TELEPHONE ENCOUNTER
Reason for call:   [x] Refill   [] Prior Auth  [] Other:     Office:   [x] PCP/Provider - Nolvia Devlin MD   [] Specialty/Provider -     Medication: Januvia 100 MG tablet / TAKE ONE TABLET BY MOUTH EVERY DAY    Pharmacy: [facility] #[id number] - [place], [place] - [address]     Does the patient have enough for 3 days?   [] Yes   [x] No - Send as HP to POD

## 2025-01-09 PROBLEM — N30.00 ACUTE CYSTITIS WITHOUT HEMATURIA: Status: RESOLVED | Noted: 2024-12-10 | Resolved: 2025-01-09

## 2025-01-14 ENCOUNTER — TELEPHONE (OUTPATIENT)
Dept: FAMILY MEDICINE CLINIC | Facility: CLINIC | Age: 89
End: 2025-01-14

## 2025-01-14 DIAGNOSIS — R11.2 NAUSEA AND VOMITING, UNSPECIFIED VOMITING TYPE: Primary | ICD-10-CM

## 2025-01-14 RX ORDER — ONDANSETRON 4 MG/1
4 TABLET, FILM COATED ORAL EVERY 8 HOURS PRN
Qty: 20 TABLET | Refills: 0 | Status: SHIPPED | OUTPATIENT
Start: 2025-01-14

## 2025-01-14 NOTE — TELEPHONE ENCOUNTER
Pt started yesterday early morning with headache, vomiting and diarrhea. She cannot keep anything down, so she hasn't had anything to eat since yesterday. She did miss her medications this morning as she was afraid she was going to throw them up.   She would like something sent to the pharmacy.

## 2025-01-24 ENCOUNTER — TELEPHONE (OUTPATIENT)
Dept: NEUROLOGY | Facility: CLINIC | Age: 89
End: 2025-01-24

## 2025-01-24 NOTE — TELEPHONE ENCOUNTER
made a call for appt reminder with Dr. Juarez 2/4/25 @2:00 pm. and also mentioned [place] office address & phone number as well.  informed arrive 10 to 15 mins early for the appt.

## 2025-01-25 DIAGNOSIS — E78.5 HYPERLIPIDEMIA, UNSPECIFIED HYPERLIPIDEMIA TYPE: ICD-10-CM

## 2025-01-25 RX ORDER — ATORVASTATIN CALCIUM 10 MG/1
10 TABLET, FILM COATED ORAL DAILY
Qty: 100 TABLET | Refills: 1 | Status: SHIPPED | OUTPATIENT
Start: 2025-01-25

## 2025-01-25 NOTE — TELEPHONE ENCOUNTER
Medication Refill Request       Medication: atorvastatin (LIPITOR) 10 mg tablet     Dose/Frequency:     Take 1 tablet (10 mg total) by mouth daily       Quantity: 100    Pharmacy: [facility] #[id number] - [place] - [address]     Office:   [x] PCP/Provider -   [] Specialty/Provider -     Does the patient have enough for 3 days?   [] Yes   [x] No - Send as HP to POD    Is the patient completely out of the medication or does not have enough until the next business day?  [] Yes - send to [facility]  [x] No - Send as HP to POD

## 2025-02-04 ENCOUNTER — OFFICE VISIT (OUTPATIENT)
Dept: NEUROLOGY | Facility: CLINIC | Age: 89
End: 2025-02-04
Payer: MEDICARE

## 2025-02-04 VITALS
RESPIRATION RATE: 14 BRPM | DIASTOLIC BLOOD PRESSURE: 60 MMHG | WEIGHT: 165.6 LBS | OXYGEN SATURATION: 98 % | BODY MASS INDEX: 30.47 KG/M2 | TEMPERATURE: 97.6 F | HEIGHT: 62 IN | HEART RATE: 79 BPM | SYSTOLIC BLOOD PRESSURE: 118 MMHG

## 2025-02-04 DIAGNOSIS — G40.209 PARTIAL SYMPTOMATIC EPILEPSY WITH COMPLEX PARTIAL SEIZURES, NOT INTRACTABLE, WITHOUT STATUS EPILEPTICUS (HCC): Primary | ICD-10-CM

## 2025-02-04 PROCEDURE — 99213 OFFICE O/P EST LOW 20 MIN: CPT | Performed by: PSYCHIATRY & NEUROLOGY

## 2025-02-04 PROCEDURE — G2211 COMPLEX E/M VISIT ADD ON: HCPCS | Performed by: PSYCHIATRY & NEUROLOGY

## 2025-02-04 RX ORDER — LANOLIN ALCOHOL/MO/W.PET/CERES
CREAM (GRAM) TOPICAL DAILY
COMMUNITY

## 2025-02-04 RX ORDER — LEVETIRACETAM 750 MG/1
750 TABLET ORAL 2 TIMES DAILY
Qty: 180 TABLET | Refills: 3 | Status: SHIPPED | OUTPATIENT
Start: 2025-02-04

## 2025-02-04 NOTE — PATIENT INSTRUCTIONS
- continue with levetiracetam 750mg twice a day  - call the office if you have an episode of unresponsiveness, loss of consciousness, lapse in awareness  - if you have a seizure then immediately stop driving until reassessed  - follow-up in 1 year.  - Seizure protocol  If she has a seizure that last less than 5 minutes then allow her to recover at home; just get her to the ground for safety.  Call 911 if the following conditions apply  - unwitnessed onset of seizure and there is a potential head injury that needs to be evaluated  - patient stops breathing or turns blue during a seizure  - if the seizure is longer than 5 minutes  - if after the seizure ends and she does not show recovery over the course of 30 minutes.  - if there are multiple seizures in 24 hours  - if the patient refused taking her medications for the past 24 hours  - if there is physical injury from the seizure

## 2025-02-04 NOTE — PROGRESS NOTES
"Neurology Ambulatory Visit  Name: Sherley Fields       : 1935       MRN: [id number]   Encounter Provider: Jennifer Juarez MD   Encounter Date: 2025  Encounter department: [facility] NEUROLOGY [facility]  Referring Provider/PCP: Nolvia Devlin MD     Assessment & Plan  Partial symptomatic epilepsy with complex partial seizures, not intractable, without status epilepticus (HCC)  - continue with levetiracetam 750mg twice a day  - call the office if you have an episode of unresponsiveness, loss of consciousness, lapse in awareness  - if you have a seizure then immediately stop driving until reassessed         She will Return in about 1 year (around 2026) for with advanced practitioner.      Assessment:  Ms. Sherley Fields is a 89 y.o. woman with focal epilepsy.  She probably had FIAS described as \"black outs\", staring and unresponsive behavior.  Prior EEG study found bilateral temporal sharp waves.  She has been taking levetiracetam and since then no recurrent seizure has been reported or witnessed.  She has mild fatigue but it is not clear if it is due to medication.  She has no cognitive symptoms to report.    Plan:   - continue with levetiracetam 750mg twice a day  - call the office if you have an episode of unresponsiveness, loss of consciousness, lapse in awareness  - if you have a seizure then immediately stop driving until reassessed  - follow-up in 1 year with advanced practitioner  - Seizure protocol  If she has a seizure that last less than 5 minutes then allow her to recover at home; just get her to the ground for safety.  Call 911 if the following conditions apply  - unwitnessed onset of seizure and there is a potential head injury that needs to be evaluated  - patient stops breathing or turns blue during a seizure  - if the seizure is longer than 5 minutes  - if after the seizure ends and she does not show recovery over the course of 30 minutes.  - if there are multiple " "seizures in 24 hours  - if the patient refused taking her medications for the past 24 hours  - if there is physical injury from the seizure        Chief Complaint:   Chief Complaint   Patient presents with    Follow-up    Seizures      HPI:    Sherley Fields is a 89 y.o. right handed female here for follow-up evaluation of focal onset epilepsy.      Interval History 2/4/2025  She was last seen by Renae Del Toro on 5/21/2024 - she reported no seizure but continues to feel very fatigued.  She believes that the last seizure she had experienced was about 7 or so years ago when she was out in [place] visiting her daughter (she recalled that she was walking down stairs, then stopped to look at something on the shelf, then she found herself \"airborne\", fell down to the right, but there was no convulsion).    There has been no episode of staring, unresponsive behavior, loss of consciousness, or convulsive activity.    AED/side effects/compliance:  Levetiracetam 750-750  None    Event/Seizure semiology:  Suspected focal impair aware - staring, unresponsive  \"black out\" - twice it happened, when she turned and had a very brief (seconds) of \"black out\" resulting in her falling    Prior Epilepsy History:  Intake History 12/15/2020  She has a history of episodes of \"blanking out\", sometimes associated with a fall or one episode when she is talking and she cuts out.  This started about 4 years ago.  Her last episode was 2 years ago, when Levetiracetam was increased.    She was previously seen by Dr. Lomax; she reported her last seizure was on 10/14/2018.  In January 2017, she was walking to her car, then she \"blacked out\" and fell to the ground.  In October 2017, she was talking to her daughter, when she stopped talking, left hand fell to the side, and she was unresponsive and staring for several seconds.  There were other episodes of when she suddenly stops talking in mid-sentence.  She described one episode when she had an " "ill-defined strange sensation, with loss of awareness.  She had an EEG study that showed bilateral independent alie-temporal sharp activity.  She was started on levetiracetam 500-500.    Patient's history:  She generally has no warning, she blacks out but she will know that it happened even if no one notices her having an episode.  She recalls that her last episode was in 2018.  Since being on her current dose of Levetiracetam there have been no recurrent episode of \"black out\".  She distinctly remembers one episode when she was walking down some stairs from a deck, when she turned and \"blacked out\" for a second and noticed that she had fallen.  She had no other cause to fall from the steps.    The patient denies any history of myoclonus, unexplained hyperkinetic behaviors, olfactory / gustatory hallucinations, epigastric rising events, dev vu events, visual hallucinations, unexplained nocturnal enuresis, or nocturnal tongue biting.    Interval History 2021  -750.  Sherley is doing fine.  She denies recurrent episodes of \"blanking out\", altered awareness, behavioral arrest, loss of consciousness, or convulsion.    Interval History 2022  -750.  She reports no recurrent episode of black outs.  There have been no episode of unresponsiveness, altered awareness, or staring spell.  She reports an episode at a dinner at least 3 summers ago, she looked at a menu, closed her eyes, her sister-in-law noticed that she looked strange, but this was a very quick instance.   She is very independent and loves to drive.     Special Features  Status epilepticus: No  Self Injury Seizures: No  Precipitating Factors: None  Post-ictal state: none    Epilepsy Risk Factors:  Abnormal pregnancy: No  Abnormal birth/: No  Abnormal Development: No  Febrile seizures, simple: No  Febrile seizures, complex: No  CNS infection: No  Mental retardation: No  Cerebral palsy: No  Head injury (moderate/severe): No  CNS " neoplasm: No  CNS malformation: No  Neurosurgical procedure: No  Stroke: No  CNS autoimmune disorder: No  Alcohol abuse: No  Drug abuse: No  Family history Sz/epilepsy: No    Prior AEDs:  medication Max dose Time used Reason to stop   levetiracetam              Prior workup:  No radiology study reviewed during this visit  [id number]  MRI brain wo  Mild chronic microvascular ischemic changes in subcortical and deep white matter of the frontal and parietal lobes bilaterally.  MRA head - no significant stenosis of the cerebral arteries  VAS carotids - no significant stenosis    EEGs:  2018 - Dr. Garcia  Bilateral, independent alie-temporal foci of sharp activity (T1-T3) and (T2-T4)    Labs:  Component      Latest Ref Rng 6/3/2024 2024   WBC      4.31 - 10.16 Thousand/uL  7.70    RBC      3.81 - 5.12 Million/uL  4.47    Hemoglobin      11.5 - 15.4 g/dL  13.1    Hematocrit      34.8 - 46.1 %  41.7    Platelet Count      149 - 390 Thousands/uL  244    Sodium      135 - 147 mmol/L  141    Potassium      3.5 - 5.3 mmol/L  4.6    Chloride      96 - 108 mmol/L  102    Carbon Dioxide      21 - 32 mmol/L  29    ANION GAP      4 - 13 mmol/L  10    BUN      5 - 25 mg/dL  16    Creatinine      0.60 - 1.30 mg/dL  0.69    GLUCOSE, FASTING      65 - 99 mg/dL  132 (H)    Calcium      8.4 - 10.2 mg/dL  9.6    AST      13 - 39 U/L  15    ALT      7 - 52 U/L  16    ALK PHOS      34 - 104 U/L  56    Total Protein      6.4 - 8.4 g/dL  7.5    Albumin      3.5 - 5.0 g/dL  4.4    Total Bilirubin      0.20 - 1.00 mg/dL  0.49    GFR, Calculated      ml/min/1.73sq m  77    Cholesterol      See Comment mg/dL  115    Triglycerides      See Comment mg/dL  183 (H)    HDL      >=50 mg/dL  39 (L)    LDL Calculated      0 - 100 mg/dL  39    Hemoglobin A1C      Normal 4.0-5.6%; PreDiabetic 5.7-6.4%; Diabetic >=6.5%; Glycemic control for adults with diabetes <7.0% %  6.7 (H)    eAG, EST AVG Glucose      mg/dl  146    LEVETIRACETA  "(KEPPRA)      12.0 - 46.0 ug/mL 22.9     TSH 3RD GENERATON      0.450 - 4.500 uIU/mL  1.227    VITAMIN D, 25-HYDROXY      30.0 - 100.0 ng/mL  102.5 (H)       General exam   /60 (BP Location: Right arm, Patient Position: Sitting, Cuff Size: Adult)   Pulse 79   Temp 97.6 °F (36.4 °C) (Temporal)   Resp 14   Ht 5' 2\" (1.575 m)   Wt 75.1 kg (165 lb 9.6 oz)   SpO2 98%   BMI 30.29 kg/m²    Appearance: normocephalic, normally developed  Carotids: not assessed  Cardiovascular: regular rate and rhythm and normal heart sounds  Pulmonary: clear to auscultation    HEENT: anicteric and neck is supple   Fundoscopy: not assessed    Mental status  Orientation: alert and oriented to name, place, time  Fund of Knowledge: intact   Attention and Concentration:  IRINA - DNO--ID -puased and tried again - DNOMAID  Current and Remote Memory:intact  Language: spontaneous speech is normal and comprehension is intact    Cranial Nerves  CN 1: not tested  CN 2: pupils equal round reactive to direct and consenual light   CN 3, 4, 6: EOMI, no nystagmus  CN 5:not assessed  CN 7:muscles of facial expression are symmetric  CN 8:not assessed  CN 9, 10:no dysarthria present  CN 11:symmetric SCM with head turns  CN 12:tongue is midline    Motor:  Bulk, Tone: normal bulk, normal tone  Pronation: not assessed  Strength: Symmetric strength of the arms and legs, no lateralizing weakness   Abnormal movements: no abnormal movements are present    Sensory:  Lighttouch: intact in all limbs  Romberg:normal    Coordination:  FNF:FNF bilaterally intact  PHILLY:slow finger taps on the left and slow finger taps on the right  FFM:intact  Gait/Station:normal gait and unable to tandem    Reflexes:  Not assessed    Past Medical/Surgical History:  Patient Active Problem List   Diagnosis    Diabetes mellitus without complication (HCC)    Essential hypertension    Other hyperlipidemia    Age-related nuclear cataract of both eyes    Vitamin D insufficiency    De " Quervain's tenosynovitis    BMI 29.0-29.9,adult    Partial symptomatic epilepsy with complex partial seizures, not intractable, without status epilepticus (HCC)    Stage 3a chronic kidney disease (HCC)    Tinnitus    Type 2 diabetes mellitus with stage 3a chronic kidney disease, without long-term current use of insulin (HCC)    History of hemochromatosis    Vitamin D deficiency    Fatigue    Other long term (current) drug therapy    COVID-19    Hyperlipidemia    Hypertension     Past Surgical History:   Procedure Laterality Date    CATARACT EXTRACTION, BILATERAL Bilateral 10/2019    KNEE SURGERY Bilateral      Past Psychiatric History:  Depression: No  Anxiety: No  Psychosis: No    Medications:    Current Outpatient Medications:     atorvastatin (LIPITOR) 10 mg tablet, Take 1 tablet (10 mg total) by mouth daily, Disp: 100 tablet, Rfl: 1    glucose blood test strip, Pt to test blood sugars twice daily, Disp: 100 each, Rfl: 3    irbesartan (AVAPRO) 75 mg tablet, TAKE 1 TABLET(75 MG) BY MOUTH DAILY, Disp: 90 tablet, Rfl: 1    Januvia 100 MG tablet, Take 1 tablet (100 mg total) by mouth daily, Disp: 90 tablet, Rfl: 1    Lancet Devices (ACCU-CHEK SOFTCLIX) lancets, Use as instructed, Disp: 100 each, Rfl: 3    Lancets Super Thin 28G MISC, Pt test bllod sugar twice daily, Disp: 100 each, Rfl: 1    levETIRAcetam (KEPPRA) 750 mg tablet, Take 1 tablet (750 mg total) by mouth 2 (two) times a day, Disp: 180 tablet, Rfl: 3    Multiple Vitamin (multivitamin) tablet, Take 1 tablet by mouth daily, Disp: , Rfl:     ondansetron (ZOFRAN) 4 mg tablet, Take 1 tablet (4 mg total) by mouth every 8 (eight) hours as needed for nausea or vomiting, Disp: 20 tablet, Rfl: 0    vitamin B-12 (VITAMIN B-12) 1,000 mcg tablet, Take by mouth daily, Disp: , Rfl:     Allergies:  Allergies   Allergen Reactions    No Active Allergies        Family history:  Family History   Problem Relation Age of Onset    No Known Problems Family     Diabetes Maternal  Grandfather      There is no family history of seizure, epilepsy or developmental delay.      Social History  Living situation:  Lives with   Work:  Completed high school, retired  at [employer]  Driving:  Yes, [place]   reports that she has never smoked. She has never used smokeless tobacco. She reports current alcohol use. She reports that she does not use drugs.    PHQ-2/9 Depression Screening    Little interest or pleasure in doing things: 0 - not at all  Feeling down, depressed, or hopeless: 0 - not at all  PHQ-2 Score: 0  PHQ-2 Interpretation: Negative depression screen         The total amount of time spent with the patient along with pre-chart and post-chart preparation was 22 minutes on the calendar day of the date of service.  This included history taking, physical exam, review of ancillary testing, counseling provided to the patient regarding diagnosis, medications, treatment, and risk management, and other communication to the patient's providers and/or family.  Start time: 2:35PM  End time: 2:57PM

## 2025-02-09 NOTE — ASSESSMENT & PLAN NOTE
- continue with levetiracetam 750mg twice a day  - call the office if you have an episode of unresponsiveness, loss of consciousness, lapse in awareness  - if you have a seizure then immediately stop driving until reassessed

## 2025-03-11 DIAGNOSIS — I10 HYPERTENSION, UNSPECIFIED TYPE: ICD-10-CM

## 2025-03-11 RX ORDER — IRBESARTAN 75 MG/1
75 TABLET ORAL DAILY
Qty: 90 TABLET | Refills: 1 | Status: SHIPPED | OUTPATIENT
Start: 2025-03-11

## 2025-06-10 ENCOUNTER — RA CDI HCC (OUTPATIENT)
Dept: OTHER | Facility: HOSPITAL | Age: 89
End: 2025-06-10

## 2025-06-11 ENCOUNTER — APPOINTMENT (OUTPATIENT)
Dept: LAB | Facility: IMAGING CENTER | Age: 89
End: 2025-06-11
Payer: MEDICARE

## 2025-06-11 DIAGNOSIS — E11.22 TYPE 2 DIABETES MELLITUS WITH STAGE 3A CHRONIC KIDNEY DISEASE, WITHOUT LONG-TERM CURRENT USE OF INSULIN (HCC): ICD-10-CM

## 2025-06-11 DIAGNOSIS — N18.31 TYPE 2 DIABETES MELLITUS WITH STAGE 3A CHRONIC KIDNEY DISEASE, WITHOUT LONG-TERM CURRENT USE OF INSULIN (HCC): ICD-10-CM

## 2025-06-11 LAB
ALBUMIN SERPL BCG-MCNC: 4.2 G/DL (ref 3.5–5)
ALP SERPL-CCNC: 56 U/L (ref 34–104)
ALT SERPL W P-5'-P-CCNC: 17 U/L (ref 7–52)
ANION GAP SERPL CALCULATED.3IONS-SCNC: 10 MMOL/L (ref 4–13)
AST SERPL W P-5'-P-CCNC: 19 U/L (ref 13–39)
BASOPHILS # BLD AUTO: 0.04 THOUSANDS/ÂΜL (ref 0–0.1)
BASOPHILS NFR BLD AUTO: 1 % (ref 0–1)
BILIRUB SERPL-MCNC: 0.5 MG/DL (ref 0.2–1)
BUN SERPL-MCNC: 14 MG/DL (ref 5–25)
CALCIUM SERPL-MCNC: 9.2 MG/DL (ref 8.4–10.2)
CHLORIDE SERPL-SCNC: 104 MMOL/L (ref 96–108)
CHOLEST SERPL-MCNC: 114 MG/DL (ref ?–200)
CO2 SERPL-SCNC: 29 MMOL/L (ref 21–32)
CREAT SERPL-MCNC: 0.72 MG/DL (ref 0.6–1.3)
CREAT UR-MCNC: 32.4 MG/DL
EOSINOPHIL # BLD AUTO: 0.18 THOUSAND/ÂΜL (ref 0–0.61)
EOSINOPHIL NFR BLD AUTO: 3 % (ref 0–6)
ERYTHROCYTE [DISTWIDTH] IN BLOOD BY AUTOMATED COUNT: 12.9 % (ref 11.6–15.1)
EST. AVERAGE GLUCOSE BLD GHB EST-MCNC: 154 MG/DL
GFR SERPL CREATININE-BSD FRML MDRD: 74 ML/MIN/1.73SQ M
GLUCOSE P FAST SERPL-MCNC: 121 MG/DL (ref 65–99)
HBA1C MFR BLD: 7 %
HCT VFR BLD AUTO: 43 % (ref 34.8–46.1)
HDLC SERPL-MCNC: 40 MG/DL
HGB BLD-MCNC: 13 G/DL (ref 11.5–15.4)
IMM GRANULOCYTES # BLD AUTO: 0.02 THOUSAND/UL (ref 0–0.2)
IMM GRANULOCYTES NFR BLD AUTO: 0 % (ref 0–2)
LDLC SERPL CALC-MCNC: 47 MG/DL (ref 0–100)
LYMPHOCYTES # BLD AUTO: 1.43 THOUSANDS/ÂΜL (ref 0.6–4.47)
LYMPHOCYTES NFR BLD AUTO: 25 % (ref 14–44)
MCH RBC QN AUTO: 28.4 PG (ref 26.8–34.3)
MCHC RBC AUTO-ENTMCNC: 30.2 G/DL (ref 31.4–37.4)
MCV RBC AUTO: 94 FL (ref 82–98)
MICROALBUMIN UR-MCNC: <7 MG/L
MONOCYTES # BLD AUTO: 0.52 THOUSAND/ÂΜL (ref 0.17–1.22)
MONOCYTES NFR BLD AUTO: 9 % (ref 4–12)
NEUTROPHILS # BLD AUTO: 3.48 THOUSANDS/ÂΜL (ref 1.85–7.62)
NEUTS SEG NFR BLD AUTO: 62 % (ref 43–75)
NRBC BLD AUTO-RTO: 0 /100 WBCS
PLATELET # BLD AUTO: 231 THOUSANDS/UL (ref 149–390)
PMV BLD AUTO: 10.8 FL (ref 8.9–12.7)
POTASSIUM SERPL-SCNC: 4.4 MMOL/L (ref 3.5–5.3)
PROT SERPL-MCNC: 7.1 G/DL (ref 6.4–8.4)
RBC # BLD AUTO: 4.58 MILLION/UL (ref 3.81–5.12)
SODIUM SERPL-SCNC: 143 MMOL/L (ref 135–147)
TRIGL SERPL-MCNC: 136 MG/DL (ref ?–150)
TSH SERPL DL<=0.05 MIU/L-ACNC: 1.19 UIU/ML (ref 0.45–4.5)
WBC # BLD AUTO: 5.67 THOUSAND/UL (ref 4.31–10.16)

## 2025-06-11 PROCEDURE — 85025 COMPLETE CBC W/AUTO DIFF WBC: CPT

## 2025-06-11 PROCEDURE — 80061 LIPID PANEL: CPT

## 2025-06-11 PROCEDURE — 36415 COLL VENOUS BLD VENIPUNCTURE: CPT

## 2025-06-11 PROCEDURE — 80053 COMPREHEN METABOLIC PANEL: CPT

## 2025-06-11 PROCEDURE — 83036 HEMOGLOBIN GLYCOSYLATED A1C: CPT

## 2025-06-11 PROCEDURE — 82043 UR ALBUMIN QUANTITATIVE: CPT

## 2025-06-11 PROCEDURE — 84443 ASSAY THYROID STIM HORMONE: CPT

## 2025-06-11 PROCEDURE — 82570 ASSAY OF URINE CREATININE: CPT

## 2025-06-13 ENCOUNTER — RESULTS FOLLOW-UP (OUTPATIENT)
Dept: FAMILY MEDICINE CLINIC | Facility: CLINIC | Age: 89
End: 2025-06-13

## 2025-06-16 NOTE — TELEPHONE ENCOUNTER
Patient called in stating she missed a call Relayed results to patient as per provider message. Patient expressed understanding and did not have any further questions.

## 2025-06-23 ENCOUNTER — OFFICE VISIT (OUTPATIENT)
Dept: FAMILY MEDICINE CLINIC | Facility: CLINIC | Age: 89
End: 2025-06-23
Payer: MEDICARE

## 2025-06-23 VITALS
RESPIRATION RATE: 16 BRPM | BODY MASS INDEX: 30.55 KG/M2 | DIASTOLIC BLOOD PRESSURE: 68 MMHG | HEART RATE: 91 BPM | SYSTOLIC BLOOD PRESSURE: 156 MMHG | HEIGHT: 62 IN | WEIGHT: 166 LBS | OXYGEN SATURATION: 98 %

## 2025-06-23 DIAGNOSIS — I10 ESSENTIAL HYPERTENSION: ICD-10-CM

## 2025-06-23 DIAGNOSIS — E78.49 OTHER HYPERLIPIDEMIA: ICD-10-CM

## 2025-06-23 DIAGNOSIS — Z00.00 MEDICARE ANNUAL WELLNESS VISIT, SUBSEQUENT: ICD-10-CM

## 2025-06-23 DIAGNOSIS — N18.31 STAGE 3A CHRONIC KIDNEY DISEASE (HCC): ICD-10-CM

## 2025-06-23 DIAGNOSIS — N18.31 TYPE 2 DIABETES MELLITUS WITH STAGE 3A CHRONIC KIDNEY DISEASE, WITHOUT LONG-TERM CURRENT USE OF INSULIN (HCC): ICD-10-CM

## 2025-06-23 DIAGNOSIS — R60.0 PERIPHERAL EDEMA: Primary | ICD-10-CM

## 2025-06-23 DIAGNOSIS — E11.22 TYPE 2 DIABETES MELLITUS WITH STAGE 3A CHRONIC KIDNEY DISEASE, WITHOUT LONG-TERM CURRENT USE OF INSULIN (HCC): ICD-10-CM

## 2025-06-23 DIAGNOSIS — G40.209 PARTIAL SYMPTOMATIC EPILEPSY WITH COMPLEX PARTIAL SEIZURES, NOT INTRACTABLE, WITHOUT STATUS EPILEPTICUS (HCC): ICD-10-CM

## 2025-06-23 DIAGNOSIS — R60.0 PERIPHERAL EDEMA: ICD-10-CM

## 2025-06-23 PROBLEM — E78.5 HYPERLIPIDEMIA: Status: RESOLVED | Noted: 2024-12-10 | Resolved: 2025-06-23

## 2025-06-23 PROBLEM — E11.9 DIABETES MELLITUS WITHOUT COMPLICATION (HCC): Status: RESOLVED | Noted: 2018-09-19 | Resolved: 2025-06-23

## 2025-06-23 PROCEDURE — G0439 PPPS, SUBSEQ VISIT: HCPCS | Performed by: FAMILY MEDICINE

## 2025-06-23 PROCEDURE — G0444 DEPRESSION SCREEN ANNUAL: HCPCS | Performed by: FAMILY MEDICINE

## 2025-06-23 PROCEDURE — 99214 OFFICE O/P EST MOD 30 MIN: CPT | Performed by: FAMILY MEDICINE

## 2025-06-23 PROCEDURE — G2211 COMPLEX E/M VISIT ADD ON: HCPCS | Performed by: FAMILY MEDICINE

## 2025-06-23 RX ORDER — FUROSEMIDE 20 MG/1
20 TABLET ORAL DAILY PRN
Qty: 30 TABLET | Refills: 1 | Status: SHIPPED | OUTPATIENT
Start: 2025-06-23 | End: 2025-06-24

## 2025-06-23 NOTE — ASSESSMENT & PLAN NOTE
Lab Results   Component Value Date    EGFR 74 06/11/2025    EGFR 77 12/05/2024    EGFR 69 06/03/2024    CREATININE 0.72 06/11/2025    CREATININE 0.69 12/05/2024    CREATININE 0.77 06/03/2024   Stable.  Continue encourage hydration.  Continue to monitor GFR.

## 2025-06-23 NOTE — PROGRESS NOTES
Name: Sherley Fields      : 1935      MRN: [id number]  Encounter Provider: Nolvia Devlin MD  Encounter Date: 2025   Encounter department: [facility]  :  Assessment & Plan  Peripheral edema  She was given prescription for Lasix 20 mg 1 tablet daily as needed.  Discussed with patient about possible side effects.  Orders:  •  furosemide (LASIX) 20 mg tablet; Take 1 tablet (20 mg total) by mouth daily as needed (edema)    Type 2 diabetes mellitus with stage 3a chronic kidney disease, without long-term current use of insulin (HCC)  A1c is elevated at 7 but has been stable.  Continue on Januvia and continue to monitor fasting glucose and A1c.  We will continue to monitor.  Repeat blood work in 6 months.  Lab Results   Component Value Date    HGBA1C 7.0 (H) 2025     Orders:  •  Albumin / creatinine urine ratio; Future  •  Comprehensive metabolic panel; Future  •  Hemoglobin A1C; Future  •  CBC and differential; Future  •  Lipid Panel with Direct LDL reflex; Future  •  TSH, 3rd generation with Free T4 reflex; Future    Stage 3a chronic kidney disease (HCC)  Lab Results   Component Value Date    EGFR 74 2025    EGFR 77 2024    EGFR 69 2024    CREATININE 0.72 2025    CREATININE 0.69 2024    CREATININE 0.77 2024   Stable.  Continue encourage hydration.  Continue to monitor GFR.       Essential hypertension  Well-controlled on Avapro 75 mg daily.  Continue same.  Will continue to monitor.       Partial symptomatic epilepsy with complex partial seizures, not intractable, without status epilepticus (HCC)  - continue with levetiracetam 750mg twice a day  - Continue to follow-up with neurology.         Other hyperlipidemia  Well-controlled on Lipitor 10mg daily. Continue medication. Will continue to monitor.       Medicare annual wellness visit, subsequent  It was discussed about immunizations, diet, exercise and safety measures.           Preventive health issues were discussed with patient, and age appropriate screening tests were ordered as noted in patient's After Visit Summary. Personalized health advice and appropriate referrals for health education or preventive services given if needed, as noted in patient's After Visit Summary.    History of Present Illness     She is here today for follow-up multiple medical problems.  She has been taking her medications.  She denies any side effect her medications.  Her blood work came back showing A1c elevated to 7 but has been stable.  All the rest of blood work came back stable.  She complained of swelling in her lower extremity bilateral.  Denies any shortness of breath.       Patient Care Team:  Nolvia Devlin MD as PCP - General (Family Medicine)    Review of Systems   Constitutional:  Negative for chills and fever.   HENT:  Negative for trouble swallowing.    Eyes:  Negative for visual disturbance.   Respiratory:  Negative for cough and shortness of breath.    Cardiovascular:  Negative for chest pain, palpitations and leg swelling.   Gastrointestinal:  Negative for abdominal pain, constipation and diarrhea.   Endocrine: Negative for cold intolerance and heat intolerance.   Genitourinary:  Negative for difficulty urinating and dysuria.   Musculoskeletal:  Negative for gait problem.   Skin:  Negative for rash.   Neurological:  Negative for dizziness, tremors, seizures and headaches.   Hematological:  Negative for adenopathy.   Psychiatric/Behavioral:  Negative for behavioral problems.      Medical History Reviewed by provider this encounter:  Tobacco  Allergies  Meds  Problems  Med Hx  Surg Hx  Fam Hx       Annual Wellness Visit Questionnaire   Sherley is here for her Subsequent Wellness visit.     Health Risk Assessment:   Patient rates overall health as good. Patient feels that their physical health rating is same. Patient is satisfied with their life. Eyesight was rated as same. Hearing  was rated as same. Patient feels that their emotional and mental health rating is same. Patients states they are never, rarely angry. Patient states they are sometimes unusually tired/fatigued. Pain experienced in the last 7 days has been some. Patient's pain rating has been 2/10. Patient states that she has experienced weight loss or gain in last 6 months.     Depression Screening:   PHQ-2 Score: 0      Fall Risk Screening:   In the past year, patient has experienced: no history of falling in past year      Urinary Incontinence Screening:   Patient has not leaked urine accidently in the last six months.     Home Safety:  Patient does not have trouble with stairs inside or outside of their home. Patient has working smoke alarms and has working carbon monoxide detector. Home safety hazards include: none.     Nutrition:   Current diet is Diabetic.     Medications:   Patient is currently taking over-the-counter supplements. OTC medications include: see medication list. Patient is able to manage medications.     Activities of Daily Living (ADLs)/Instrumental Activities of Daily Living (IADLs):   Walk and transfer into and out of bed and chair?: Yes  Dress and groom yourself?: Yes    Bathe or shower yourself?: Yes    Feed yourself? Yes  Do your laundry/housekeeping?: Yes  Manage your money, pay your bills and track your expenses?: Yes  Make your own meals?: Yes    Do your own shopping?: Yes    Previous Hospitalizations:   Any hospitalizations or ED visits within the last 12 months?: No      Advance Care Planning:   Living will: Yes    Durable POA for healthcare: Yes    Advanced directive: Yes      Cognitive Screening:   Provider or family/friend/caregiver concerned regarding cognition?: No    Preventive Screenings      Cardiovascular Screening:    General: Screening Not Indicated and History Lipid Disorder      Diabetes Screening:     General: Screening Not Indicated and History Diabetes      Colorectal Cancer Screening:  "    General: Screening Not Indicated      Breast Cancer Screening:     General: Screening Not Indicated      Cervical Cancer Screening:    General: Screening Not Indicated      Osteoporosis Screening:    General: Risks and Benefits Discussed      Abdominal Aortic Aneurysm (AAA) Screening:        General: Screening Not Indicated      Lung Cancer Screening:     General: Screening Not Indicated      Hepatitis C Screening:    General: Risks and Benefits Discussed    Immunizations:  - Immunizations due: Zoster (Shingrix)    Screening, Brief Intervention, and Referral to Treatment (SBIRT)     Screening  Typical number of drinks in a day: 0  Typical number of drinks in a week: 1  Interpretation: Low risk drinking behavior.    Brief Intervention  Alcohol & drug use screenings were reviewed. No concerns regarding substance use disorder identified.     Annual Depression Screening  Time spent screening and evaluating the patient for depression during today's encounter was 7 minutes.    Other Counseling Topics:   Calcium and vitamin D intake and regular weightbearing exercise.        No results found.    Objective   /68 (BP Location: Left arm, Patient Position: Sitting, Cuff Size: Standard)   Pulse 91   Resp 16   Ht 5' 2\" (1.575 m)   Wt 75.3 kg (166 lb)   SpO2 98%   BMI 30.36 kg/m²     Physical Exam  Vitals and nursing note reviewed.   Constitutional:       Appearance: She is well-developed.   HENT:      Head: Normocephalic and atraumatic.     Eyes:      Pupils: Pupils are equal, round, and reactive to light.       Cardiovascular:      Rate and Rhythm: Normal rate and regular rhythm.      Heart sounds: Normal heart sounds.   Pulmonary:      Effort: Pulmonary effort is normal.      Breath sounds: Normal breath sounds.   Abdominal:      General: Bowel sounds are normal.      Palpations: Abdomen is soft.     Musculoskeletal:      Cervical back: Normal range of motion and neck supple.      Right lower leg: Edema " present.      Left lower leg: Edema present.   Lymphadenopathy:      Cervical: No cervical adenopathy.     Skin:     General: Skin is warm.     Neurological:      Mental Status: She is alert and oriented to person, place, and time.

## 2025-06-23 NOTE — ASSESSMENT & PLAN NOTE
A1c is elevated at 7 but has been stable.  Continue on Januvia and continue to monitor fasting glucose and A1c.  We will continue to monitor.  Repeat blood work in 6 months.  Lab Results   Component Value Date    HGBA1C 7.0 (H) 06/11/2025     Orders:  •  Albumin / creatinine urine ratio; Future  •  Comprehensive metabolic panel; Future  •  Hemoglobin A1C; Future  •  CBC and differential; Future  •  Lipid Panel with Direct LDL reflex; Future  •  TSH, 3rd generation with Free T4 reflex; Future

## 2025-06-23 NOTE — ASSESSMENT & PLAN NOTE
She was given prescription for Lasix 20 mg 1 tablet daily as needed.  Discussed with patient about possible side effects.  Orders:  •  furosemide (LASIX) 20 mg tablet; Take 1 tablet (20 mg total) by mouth daily as needed (edema)

## 2025-06-24 RX ORDER — FUROSEMIDE 20 MG/1
20 TABLET ORAL DAILY PRN
Qty: 90 TABLET | Refills: 0 | Status: SHIPPED | OUTPATIENT
Start: 2025-06-24

## 2025-06-25 DIAGNOSIS — E11.9 TYPE 2 DIABETES MELLITUS WITHOUT COMPLICATION, UNSPECIFIED WHETHER LONG TERM INSULIN USE (HCC): ICD-10-CM

## 2025-06-25 NOTE — TELEPHONE ENCOUNTER
Reason for call:   [x] Refill   [] Prior Auth  [] Other:     Office:   [x] PCP/Provider - Nolvia Devlin MD   [] Specialty/Provider -     Medication:  glucose blood test strip (Pt uses accu-chek vilma plus)     Dose/Frequency: Pt to test blood sugars twice daily,     Quantity: 100    Pharmacy:   [id number] [place], [place] - [address] [facility]   Does the patient have enough for 3 days?   [] Yes   [x] No - Send as HP to POD    Mail Away Pharmacy   Does the patient have enough for 10 days?   [] Yes   [] No - Send as HP to POD

## 2025-07-18 ENCOUNTER — TELEPHONE (OUTPATIENT)
Age: 89
End: 2025-07-18

## 2025-07-18 NOTE — TELEPHONE ENCOUNTER
[email or web address] stating the forms faxed by the provider is incomplete on Section 3 and Section 7 he needs verbal confirmation on the same. While I was about to transfer the call to the practice he got disconnected. Kindly call back kelli @ [phone] and complete the blank and refax by today please. As he mentioned need to finalize the billing by today. Thanks

## 2025-07-23 PROBLEM — Z00.00 MEDICARE ANNUAL WELLNESS VISIT, SUBSEQUENT: Status: RESOLVED | Noted: 2025-06-23 | Resolved: 2025-07-23

## 2025-08-07 DIAGNOSIS — E78.5 HYPERLIPIDEMIA, UNSPECIFIED HYPERLIPIDEMIA TYPE: ICD-10-CM

## 2025-08-08 RX ORDER — ATORVASTATIN CALCIUM 10 MG/1
10 TABLET, FILM COATED ORAL DAILY
Qty: 100 TABLET | Refills: 1 | Status: SHIPPED | OUTPATIENT
Start: 2025-08-08